# Patient Record
Sex: MALE | Race: WHITE | NOT HISPANIC OR LATINO | Employment: UNEMPLOYED | URBAN - METROPOLITAN AREA
[De-identification: names, ages, dates, MRNs, and addresses within clinical notes are randomized per-mention and may not be internally consistent; named-entity substitution may affect disease eponyms.]

---

## 2022-04-09 ENCOUNTER — HISTORICAL (OUTPATIENT)
Dept: ADMINISTRATIVE | Facility: HOSPITAL | Age: 57
End: 2022-04-09

## 2022-04-25 VITALS
DIASTOLIC BLOOD PRESSURE: 78 MMHG | BODY MASS INDEX: 30.72 KG/M2 | HEIGHT: 69 IN | WEIGHT: 207.44 LBS | SYSTOLIC BLOOD PRESSURE: 128 MMHG | OXYGEN SATURATION: 99 %

## 2022-07-20 ENCOUNTER — HOSPITAL ENCOUNTER (EMERGENCY)
Facility: HOSPITAL | Age: 57
Discharge: HOME | End: 2022-07-20
Attending: FAMILY MEDICINE
Payer: MEDICAID

## 2022-07-20 VITALS
HEIGHT: 69 IN | WEIGHT: 190 LBS | OXYGEN SATURATION: 96 % | RESPIRATION RATE: 14 BRPM | HEART RATE: 56 BPM | DIASTOLIC BLOOD PRESSURE: 87 MMHG | BODY MASS INDEX: 28.14 KG/M2 | TEMPERATURE: 98 F | SYSTOLIC BLOOD PRESSURE: 149 MMHG

## 2022-07-20 DIAGNOSIS — I10 PRIMARY HYPERTENSION: Primary | ICD-10-CM

## 2022-07-20 DIAGNOSIS — R05.9 COUGH: ICD-10-CM

## 2022-07-20 LAB
AMPHET UR QL SCN: POSITIVE
ANION GAP SERPL CALC-SCNC: 11 MEQ/L
BARBITURATE SCN PRESENT UR: NEGATIVE
BASOPHILS # BLD AUTO: 0.03 X10(3)/MCL (ref 0–0.2)
BASOPHILS NFR BLD AUTO: 0.8 %
BENZODIAZ UR QL SCN: NEGATIVE
BUN SERPL-MCNC: 32.5 MG/DL (ref 8.4–25.7)
CALCIUM SERPL-MCNC: 9.5 MG/DL (ref 8.4–10.2)
CANNABINOIDS UR QL SCN: NEGATIVE
CHLORIDE SERPL-SCNC: 103 MMOL/L (ref 98–107)
CO2 SERPL-SCNC: 24 MMOL/L (ref 22–29)
COCAINE UR QL SCN: NEGATIVE
CREAT SERPL-MCNC: 1.62 MG/DL (ref 0.73–1.18)
CREAT/UREA NIT SERPL: 20
EOSINOPHIL # BLD AUTO: 0.11 X10(3)/MCL (ref 0–0.9)
EOSINOPHIL NFR BLD AUTO: 3.1 %
ERYTHROCYTE [DISTWIDTH] IN BLOOD BY AUTOMATED COUNT: 13.3 % (ref 11.5–17)
FLUAV AG UPPER RESP QL IA.RAPID: NOT DETECTED
FLUBV AG UPPER RESP QL IA.RAPID: NOT DETECTED
GLUCOSE SERPL-MCNC: 116 MG/DL (ref 74–100)
HCT VFR BLD AUTO: 37.9 % (ref 42–52)
HGB BLD-MCNC: 12.7 GM/DL (ref 14–18)
IMM GRANULOCYTES # BLD AUTO: 0.01 X10(3)/MCL (ref 0–0.04)
IMM GRANULOCYTES NFR BLD AUTO: 0.3 %
LYMPHOCYTES # BLD AUTO: 1.29 X10(3)/MCL (ref 0.6–4.6)
LYMPHOCYTES NFR BLD AUTO: 35.8 %
MCH RBC QN AUTO: 27.9 PG (ref 27–31)
MCHC RBC AUTO-ENTMCNC: 33.5 MG/DL (ref 33–36)
MCV RBC AUTO: 83.1 FL (ref 80–94)
MONOCYTES # BLD AUTO: 0.51 X10(3)/MCL (ref 0.1–1.3)
MONOCYTES NFR BLD AUTO: 14.2 %
NEUTROPHILS # BLD AUTO: 1.7 X10(3)/MCL (ref 2.1–9.2)
NEUTROPHILS NFR BLD AUTO: 45.8 %
OPIATES UR QL SCN: NEGATIVE
PCP UR QL: NEGATIVE
PH UR: 6.5 [PH] (ref 3–11)
PLATELET # BLD AUTO: 181 X10(3)/MCL (ref 130–400)
PMV BLD AUTO: 8.4 FL (ref 7.4–10.4)
POC CARDIAC TROPONIN I: 0 NG/ML
POTASSIUM SERPL-SCNC: 4.3 MMOL/L (ref 3.5–5.1)
RBC # BLD AUTO: 4.56 X10(6)/MCL (ref 4.7–6.1)
RSV A 5' UTR RNA NPH QL NAA+PROBE: NOT DETECTED
SAMPLE: NORMAL
SARS-COV-2 RNA RESP QL NAA+PROBE: NOT DETECTED
SODIUM SERPL-SCNC: 138 MMOL/L (ref 136–145)
SPECIFIC GRAVITY, URINE AUTO (.000) (OHS): 1.02 (ref 1–1.03)
WBC # SPEC AUTO: 3.6 X10(3)/MCL (ref 4.5–11.5)

## 2022-07-20 PROCEDURE — 85025 COMPLETE CBC W/AUTO DIFF WBC: CPT | Performed by: FAMILY MEDICINE

## 2022-07-20 PROCEDURE — 96374 THER/PROPH/DIAG INJ IV PUSH: CPT

## 2022-07-20 PROCEDURE — 36415 COLL VENOUS BLD VENIPUNCTURE: CPT | Performed by: FAMILY MEDICINE

## 2022-07-20 PROCEDURE — 93005 ELECTROCARDIOGRAM TRACING: CPT

## 2022-07-20 PROCEDURE — 96375 TX/PRO/DX INJ NEW DRUG ADDON: CPT

## 2022-07-20 PROCEDURE — 63600175 PHARM REV CODE 636 W HCPCS: Performed by: FAMILY MEDICINE

## 2022-07-20 PROCEDURE — 93010 EKG 12-LEAD: ICD-10-PCS | Mod: ,,, | Performed by: INTERNAL MEDICINE

## 2022-07-20 PROCEDURE — 80307 DRUG TEST PRSMV CHEM ANLYZR: CPT | Performed by: FAMILY MEDICINE

## 2022-07-20 PROCEDURE — 93010 ELECTROCARDIOGRAM REPORT: CPT | Mod: ,,, | Performed by: INTERNAL MEDICINE

## 2022-07-20 PROCEDURE — 80048 BASIC METABOLIC PNL TOTAL CA: CPT | Performed by: FAMILY MEDICINE

## 2022-07-20 PROCEDURE — 87636 SARSCOV2 & INF A&B AMP PRB: CPT | Performed by: FAMILY MEDICINE

## 2022-07-20 PROCEDURE — 25000003 PHARM REV CODE 250: Performed by: FAMILY MEDICINE

## 2022-07-20 PROCEDURE — 99285 EMERGENCY DEPT VISIT HI MDM: CPT | Mod: 25

## 2022-07-20 PROCEDURE — 84484 ASSAY OF TROPONIN QUANT: CPT

## 2022-07-20 RX ORDER — LABETALOL HYDROCHLORIDE 5 MG/ML
20 INJECTION, SOLUTION INTRAVENOUS
Status: COMPLETED | OUTPATIENT
Start: 2022-07-20 | End: 2022-07-20

## 2022-07-20 RX ORDER — KETOROLAC TROMETHAMINE 30 MG/ML
30 INJECTION, SOLUTION INTRAMUSCULAR; INTRAVENOUS
Status: COMPLETED | OUTPATIENT
Start: 2022-07-20 | End: 2022-07-20

## 2022-07-20 RX ORDER — BISOPROLOL FUMARATE AND HYDROCHLOROTHIAZIDE 10; 6.25 MG/1; MG/1
1 TABLET ORAL DAILY
Qty: 90 TABLET | Refills: 3 | Status: SHIPPED | OUTPATIENT
Start: 2022-07-20 | End: 2024-02-07

## 2022-07-20 RX ORDER — ACETAMINOPHEN 500 MG
1000 TABLET ORAL
Status: COMPLETED | OUTPATIENT
Start: 2022-07-20 | End: 2022-07-20

## 2022-07-20 RX ORDER — AMLODIPINE BESYLATE 10 MG/1
10 TABLET ORAL DAILY
Qty: 90 TABLET | Refills: 3 | Status: SHIPPED | OUTPATIENT
Start: 2022-07-20 | End: 2024-02-07

## 2022-07-20 RX ORDER — LISINOPRIL 10 MG/1
10 TABLET ORAL DAILY
Qty: 90 TABLET | Refills: 3 | Status: SHIPPED | OUTPATIENT
Start: 2022-07-20 | End: 2024-02-07

## 2022-07-20 RX ORDER — AMLODIPINE BESYLATE 5 MG/1
5 TABLET ORAL
Status: COMPLETED | OUTPATIENT
Start: 2022-07-20 | End: 2022-07-20

## 2022-07-20 RX ORDER — LISINOPRIL 10 MG/1
10 TABLET ORAL
Status: COMPLETED | OUTPATIENT
Start: 2022-07-20 | End: 2022-07-20

## 2022-07-20 RX ADMIN — LABETALOL HYDROCHLORIDE 20 MG: 5 INJECTION, SOLUTION INTRAVENOUS at 11:07

## 2022-07-20 RX ADMIN — AMLODIPINE BESYLATE 5 MG: 5 TABLET ORAL at 01:07

## 2022-07-20 RX ADMIN — KETOROLAC TROMETHAMINE 30 MG: 30 INJECTION, SOLUTION INTRAMUSCULAR; INTRAVENOUS at 12:07

## 2022-07-20 RX ADMIN — ACETAMINOPHEN 1000 MG: 500 TABLET, FILM COATED ORAL at 11:07

## 2022-07-20 RX ADMIN — LISINOPRIL 10 MG: 10 TABLET ORAL at 01:07

## 2022-07-20 NOTE — ED PROVIDER NOTES
Encounter Date: 7/20/2022       History     Chief Complaint   Patient presents with    Shortness of Breath    Dizziness     56-year-old presents complaining of coughing shortness of breath some dizziness headache patient says he has been off his blood pressure medicines for about a week started feeling bad yesterday also some low-grade fever body aches flu-like symptoms        Review of patient's allergies indicates:  Not on File  History reviewed. No pertinent past medical history.  History reviewed. No pertinent surgical history.  History reviewed. No pertinent family history.     Review of Systems   Constitutional: Negative for fever.   HENT: Positive for congestion. Negative for sore throat.    Respiratory: Positive for cough. Negative for shortness of breath.    Cardiovascular: Negative for chest pain.   Gastrointestinal: Negative for nausea.   Genitourinary: Negative for dysuria.   Musculoskeletal: Negative for back pain.   Skin: Negative for rash.   Neurological: Negative for weakness.   Hematological: Does not bruise/bleed easily.   All other systems reviewed and are negative.      Physical Exam     Initial Vitals [07/20/22 1100]   BP Pulse Resp Temp SpO2   (!) 183/104 68 18 97.6 °F (36.4 °C) 100 %      MAP       --         Physical Exam    Nursing note and vitals reviewed.  Constitutional: He appears well-developed and well-nourished. He is active.   HENT:   Head: Normocephalic and atraumatic.   Eyes: Conjunctivae, EOM and lids are normal. Pupils are equal, round, and reactive to light.   Neck: Trachea normal and phonation normal. Neck supple. No thyroid mass present.   Normal range of motion.  Cardiovascular: Normal rate, regular rhythm, normal heart sounds and normal pulses.   Pulmonary/Chest: Breath sounds normal.   Abdominal: Abdomen is soft. Bowel sounds are normal.   Musculoskeletal:         General: Normal range of motion.      Cervical back: Normal range of motion and neck supple.      Neurological: He is alert and oriented to person, place, and time. He has normal strength and normal reflexes.   Skin: Skin is warm and intact.   Psychiatric: He has a normal mood and affect. His speech is normal and behavior is normal. Judgment and thought content normal. Cognition and memory are normal.         ED Course   Procedures  Labs Reviewed   BASIC METABOLIC PANEL - Abnormal; Notable for the following components:       Result Value    Glucose Level 116 (*)     Blood Urea Nitrogen 32.5 (*)     Creatinine 1.62 (*)     All other components within normal limits   DRUG SCREEN, URINE (BEAKER) - Abnormal; Notable for the following components:    Amphetamines, Urine Positive (*)     All other components within normal limits    Narrative:     Cut off concentrations:    Amphetamines - 1000 ng/ml  Barbiturates - 200 ng/ml  Benzodiazepine - 200 ng/ml  Cannabinoids (THC) - 50 ng/ml  Cocaine - 300 ng/ml  Fentanyl - 1.0 ng/ml  MDMA - 500 ng/ml  Opiates - 300 ng/ml   Phencyclidine (PCP) - 25 ng/ml    Specimen submitted for drug analysis and tested for pH and specific gravity in order to evaluate sample integrity. Suspect tampering if specific gravity is <1.003 and/or pH is not within the range of 4.5 - 8.0  False negatives may result form substances such as bleach added to urine.  False positives may result for the presence of a substance with similar chemical structure to the drug or its metabolite.    This test provides only a PRELIMINARY analytical test result. A more specific alternate chemical method must be used in order to obtain a confirmed analytical result. Gas chromatography/mass spectrometry (GC/MS) is the preferred confirmatory method. Other chemical confirmation methods are available. Clinical consideration and professional judgement should be applied to any drug of abuse test result, particularly when preliminary positive results are used.    Positive results will be confirmed only at the physicians  request. Unconfirmed screening results are to be used only for medical purposes (treatment).        CBC WITH DIFFERENTIAL - Abnormal; Notable for the following components:    WBC 3.6 (*)     RBC 4.56 (*)     Hgb 12.7 (*)     Hct 37.9 (*)     Neut # 1.7 (*)     All other components within normal limits   COVID/RSV/FLU A&B PCR - Normal   CBC W/ AUTO DIFFERENTIAL    Narrative:     The following orders were created for panel order CBC Auto Differential.  Procedure                               Abnormality         Status                     ---------                               -----------         ------                     CBC with Differential[[id number]]        Abnormal            Final result                 Please view results for these tests on the individual orders.   TROPONIN ISTAT   POCT TROPONIN     EKG Readings: (Independently Interpreted)   Initial Reading: No STEMI. Rhythm: Normal Sinus Rhythm. Heart Rate: 67. Ectopy: No Ectopy. ST Segments: Normal ST Segments. T Waves: Normal. Axis: Normal.       Imaging Results          X-Ray Chest 1 View (Final result)  Result time 07/20/22 11:47:24    Final result by Aris Linda MD (07/20/22 11:47:24)                 Impression:      NO ACUTE CARDIOPULMONARY PROCESS IDENTIFIED.      Electronically signed by: Aris Linda  Date:    07/20/2022  Time:    11:47             Narrative:    EXAMINATION:  XR CHEST 1 VIEW    CLINICAL HISTORY:  Cough, unspecified    TECHNIQUE:  One view    COMPARISON:  May 5, 2020.    FINDINGS:  Cardiopericardial silhouette is within normal limits.  No acute dense focal or segmental consolidation, congestive process, pleural effusions or pneumothorax.                               CT Head Without Contrast (Final result)  Result time 07/20/22 11:47:06    Final result by Art Silva MD (07/20/22 11:47:06)                 Impression:      No acute intracranial abnormality identified.      Electronically signed by: Art  Silva  Date:    07/20/2022  Time:    11:47             Narrative:    EXAMINATION:  CT HEAD WITHOUT CONTRAST    CLINICAL HISTORY:  head trauma;    TECHNIQUE:  Low dose axial images were obtained through the head.  Coronal and sagittal reformations were also performed. Contrast was not administered.    Automatic exposure control was utilized to reduce the patient's radiation dose.    DLP= 1381    COMPARISON:  01/28/2021    FINDINGS:  No acute intracranial hemorrhage, edema or mass. No acute parenchymal abnormality.  Calcification from remote insult is noted about the left parietal region.    There is no hydrocephalus, evidence of herniation or midline shift. The ventricles and sulci are normal.    There is normal gray white differentiation.    The osseous structures are normal.    The mastoid air cells are clear.    The auditory canals are patent bilaterally.    The globes and orbital contents are normal bilaterally.    The visualized maxillary, ethmoid and sphenoid sinuses are clear.                                 Medications   acetaminophen tablet 1,000 mg (1,000 mg Oral Given 7/20/22 1120)   labetaloL injection 20 mg (20 mg Intravenous Given 7/20/22 1115)   ketorolac injection 30 mg (30 mg Intravenous Given 7/20/22 1221)                 ED Course as of 07/20/22 1315   Wed Jul 20, 2022   1211 POC Cardiac Troponin I: 0.00 [BL]   1211 WBC(!): 3.6 [BL]   1211 Hemoglobin(!): 12.7 [BL]   1211 Hematocrit(!): 37.9 [BL]   1211 BUN(!): 32.5 [BL]   1211 Creatinine(!): 1.62 [BL]   1212 Amphetamine Screen, Ur(!): Positive [BL]   1259 SARS-CoV2 (COVID-19) Qualitative PCR: Not Detected [BL]   1259 RSV Ag by Molecular Method: Not Detected [BL]   1259 Influenza B, Molecular: Not Detected [BL]   1259 Influenza A, Molecular: Not Detected [BL]      ED Course User Index  [BL] Maikel Owens MD             Clinical Impression:   Final diagnoses:  [R05.9] Cough  [I10] Primary hypertension (Primary)          ED Disposition  Condition    Discharge Stable        ED Prescriptions     Medication Sig Dispense Start Date End Date Auth. Provider    amLODIPine (NORVASC) 10 MG tablet Take 1 tablet (10 mg total) by mouth once daily. 90 tablet 7/20/2022  Maikel Owens MD    bisoproloL-hydrochlorothiazide (ZIAC) 10-6.25 mg per tablet Take 1 tablet by mouth once daily. 90 tablet 7/20/2022  Maikel Owens MD    lisinopriL 10 MG tablet Take 1 tablet (10 mg total) by mouth once daily. 90 tablet 7/20/2022  Maikel Owens MD        Follow-up Information     Follow up With Specialties Details Why Contact Info    [facility] - Emergency Dept Emergency Medicine  If symptoms worsen [address] [facility] [address] [phone]           Maikel Owens MD  07/20/22 0693       Maikel Owens MD  07/20/22 4483

## 2022-07-20 NOTE — ED NOTES
Pt c/o sob that started this morning, along with dizziness, Pt denies CP, fever, chills. Pt c/o cough and some congestion. Past medical hx pt states he had a brain aneurysm about 3 years ago, was seen at [facility]. Pt unable to state what medications he takes for HTN, pt states he has been out of his medicines for 2 weeks.

## 2022-10-01 ENCOUNTER — HOSPITAL ENCOUNTER (OUTPATIENT)
Facility: HOSPITAL | Age: 57
Discharge: HOME | End: 2022-10-03
Attending: SPECIALIST | Admitting: STUDENT IN AN ORGANIZED HEALTH CARE EDUCATION/TRAINING PROGRAM
Payer: MEDICAID

## 2022-10-01 DIAGNOSIS — G43.019 INTRACTABLE MIGRAINE WITHOUT AURA AND WITHOUT STATUS MIGRAINOSUS: ICD-10-CM

## 2022-10-01 DIAGNOSIS — G43.719 INTRACTABLE CHRONIC MIGRAINE WITHOUT AURA AND WITHOUT STATUS MIGRAINOSUS: ICD-10-CM

## 2022-10-01 DIAGNOSIS — A08.4 VIRAL GASTROENTERITIS: ICD-10-CM

## 2022-10-01 DIAGNOSIS — R03.0 ELEVATED BLOOD PRESSURE READING: ICD-10-CM

## 2022-10-01 DIAGNOSIS — R11.2 NAUSEA AND VOMITING, UNSPECIFIED VOMITING TYPE: ICD-10-CM

## 2022-10-01 DIAGNOSIS — K52.9 ACUTE GASTROENTERITIS: Primary | ICD-10-CM

## 2022-10-01 LAB
ALBUMIN SERPL-MCNC: 4.1 GM/DL (ref 3.5–5)
ALBUMIN/GLOB SERPL: 1.2 RATIO (ref 1.1–2)
ALP SERPL-CCNC: 142 UNIT/L (ref 40–150)
ALT SERPL-CCNC: 21 UNIT/L (ref 0–55)
AST SERPL-CCNC: 18 UNIT/L (ref 5–34)
BASOPHILS # BLD AUTO: 0.02 X10(3)/MCL (ref 0–0.2)
BASOPHILS NFR BLD AUTO: 0.3 %
BILIRUBIN DIRECT+TOT PNL SERPL-MCNC: 0.2 MG/DL
BUN SERPL-MCNC: 23.6 MG/DL (ref 8.4–25.7)
CALCIUM SERPL-MCNC: 9.5 MG/DL (ref 8.4–10.2)
CHLORIDE SERPL-SCNC: 110 MMOL/L (ref 98–107)
CO2 SERPL-SCNC: 19 MMOL/L (ref 22–29)
CREAT SERPL-MCNC: 1.11 MG/DL (ref 0.73–1.18)
EOSINOPHIL # BLD AUTO: 0.34 X10(3)/MCL (ref 0–0.9)
EOSINOPHIL NFR BLD AUTO: 4.8 %
ERYTHROCYTE [DISTWIDTH] IN BLOOD BY AUTOMATED COUNT: 12.8 % (ref 11.5–17)
GFR SERPLBLD CREATININE-BSD FMLA CKD-EPI: >60 MLS/MIN/1.73/M2
GLOBULIN SER-MCNC: 3.4 GM/DL (ref 2.4–3.5)
GLUCOSE SERPL-MCNC: 78 MG/DL (ref 74–100)
HCT VFR BLD AUTO: 40.9 % (ref 42–52)
HGB BLD-MCNC: 14.1 GM/DL (ref 14–18)
IMM GRANULOCYTES # BLD AUTO: 0.01 X10(3)/MCL (ref 0–0.04)
IMM GRANULOCYTES NFR BLD AUTO: 0.1 %
LYMPHOCYTES # BLD AUTO: 1.46 X10(3)/MCL (ref 0.6–4.6)
LYMPHOCYTES NFR BLD AUTO: 20.6 %
MCH RBC QN AUTO: 27.7 PG (ref 27–31)
MCHC RBC AUTO-ENTMCNC: 34.5 MG/DL (ref 33–36)
MCV RBC AUTO: 80.4 FL (ref 80–94)
MONOCYTES # BLD AUTO: 0.6 X10(3)/MCL (ref 0.1–1.3)
MONOCYTES NFR BLD AUTO: 8.5 %
NEUTROPHILS # BLD AUTO: 4.7 X10(3)/MCL (ref 2.1–9.2)
NEUTROPHILS NFR BLD AUTO: 65.7 %
PLATELET # BLD AUTO: 240 X10(3)/MCL (ref 130–400)
PMV BLD AUTO: 10 FL (ref 7.4–10.4)
POTASSIUM SERPL-SCNC: 3.5 MMOL/L (ref 3.5–5.1)
PROT SERPL-MCNC: 7.5 GM/DL (ref 6.4–8.3)
RBC # BLD AUTO: 5.09 X10(6)/MCL (ref 4.7–6.1)
SODIUM SERPL-SCNC: 142 MMOL/L (ref 136–145)
WBC # SPEC AUTO: 7.1 X10(3)/MCL (ref 4.5–11.5)

## 2022-10-01 PROCEDURE — 85025 COMPLETE CBC W/AUTO DIFF WBC: CPT | Performed by: SPECIALIST

## 2022-10-01 PROCEDURE — 99285 EMERGENCY DEPT VISIT HI MDM: CPT | Mod: 25

## 2022-10-01 PROCEDURE — 25000003 PHARM REV CODE 250: Performed by: SPECIALIST

## 2022-10-01 PROCEDURE — 96375 TX/PRO/DX INJ NEW DRUG ADDON: CPT

## 2022-10-01 PROCEDURE — 83690 ASSAY OF LIPASE: CPT | Performed by: SPECIALIST

## 2022-10-01 PROCEDURE — 96374 THER/PROPH/DIAG INJ IV PUSH: CPT | Mod: 59

## 2022-10-01 PROCEDURE — 0241U COVID/RSV/FLU A&B PCR: CPT | Performed by: SPECIALIST

## 2022-10-01 PROCEDURE — 63600175 PHARM REV CODE 636 W HCPCS: Performed by: SPECIALIST

## 2022-10-01 PROCEDURE — 96361 HYDRATE IV INFUSION ADD-ON: CPT

## 2022-10-01 PROCEDURE — 36415 COLL VENOUS BLD VENIPUNCTURE: CPT | Performed by: SPECIALIST

## 2022-10-01 PROCEDURE — 80053 COMPREHEN METABOLIC PANEL: CPT | Performed by: SPECIALIST

## 2022-10-01 RX ORDER — KETOROLAC TROMETHAMINE 30 MG/ML
30 INJECTION, SOLUTION INTRAMUSCULAR; INTRAVENOUS
Status: COMPLETED | OUTPATIENT
Start: 2022-10-01 | End: 2022-10-01

## 2022-10-01 RX ORDER — ONDANSETRON 2 MG/ML
INJECTION INTRAMUSCULAR; INTRAVENOUS
Status: DISPENSED
Start: 2022-10-01 | End: 2022-10-02

## 2022-10-01 RX ORDER — ONDANSETRON 4 MG/1
4 TABLET, ORALLY DISINTEGRATING ORAL
Status: DISCONTINUED | OUTPATIENT
Start: 2022-10-01 | End: 2022-10-01

## 2022-10-01 RX ORDER — ONDANSETRON 2 MG/ML
4 INJECTION INTRAMUSCULAR; INTRAVENOUS
Status: COMPLETED | OUTPATIENT
Start: 2022-10-01 | End: 2022-10-01

## 2022-10-01 RX ORDER — HYDRALAZINE HYDROCHLORIDE 20 MG/ML
20 INJECTION INTRAMUSCULAR; INTRAVENOUS
Status: COMPLETED | OUTPATIENT
Start: 2022-10-02 | End: 2022-10-02

## 2022-10-01 RX ORDER — DIPHENOXYLATE HYDROCHLORIDE AND ATROPINE SULFATE 2.5; .025 MG/1; MG/1
2 TABLET ORAL
Status: COMPLETED | OUTPATIENT
Start: 2022-10-01 | End: 2022-10-01

## 2022-10-01 RX ADMIN — IOPAMIDOL 100 ML: 755 INJECTION, SOLUTION INTRAVENOUS at 11:10

## 2022-10-01 RX ADMIN — SODIUM CHLORIDE 1000 ML: 9 INJECTION, SOLUTION INTRAVENOUS at 10:10

## 2022-10-01 RX ADMIN — DIPHENOXYLATE HYDROCHLORIDE AND ATROPINE SULFATE 2 TABLET: .025; 2.5 TABLET ORAL at 09:10

## 2022-10-01 RX ADMIN — KETOROLAC TROMETHAMINE 30 MG: 30 INJECTION, SOLUTION INTRAMUSCULAR at 10:10

## 2022-10-01 RX ADMIN — ONDANSETRON 4 MG: 2 INJECTION INTRAMUSCULAR; INTRAVENOUS at 09:10

## 2022-10-02 LAB
AMPHET UR QL SCN: NEGATIVE
APPEARANCE UR: CLEAR
BACTERIA #/AREA URNS AUTO: NORMAL /HPF
BARBITURATE SCN PRESENT UR: NEGATIVE
BENZODIAZ UR QL SCN: NEGATIVE
BILIRUB UR QL STRIP.AUTO: NEGATIVE MG/DL
CANNABINOIDS UR QL SCN: NEGATIVE
CLOSTRIDIUM DIFFICILE GDH ANTIGEN (OHS): NEGATIVE
CLOSTRIDIUM DIFFICILE TOXIN A/B (OHS): NEGATIVE
COCAINE UR QL SCN: NEGATIVE
COLOR UR AUTO: YELLOW
FLUAV AG UPPER RESP QL IA.RAPID: NOT DETECTED
FLUBV AG UPPER RESP QL IA.RAPID: NOT DETECTED
GLUCOSE UR QL STRIP.AUTO: NEGATIVE MG/DL
KETONES UR QL STRIP.AUTO: NEGATIVE MG/DL
LEUKOCYTE ESTERASE UR QL STRIP.AUTO: NEGATIVE UNIT/L
LIPASE SERPL-CCNC: 79 U/L
NITRITE UR QL STRIP.AUTO: NEGATIVE
OPIATES UR QL SCN: POSITIVE
PCP UR QL: NEGATIVE
PH UR STRIP.AUTO: 5.5 [PH]
PH UR: 5.5 [PH] (ref 3–11)
PROT UR QL STRIP.AUTO: NEGATIVE MG/DL
RBC #/AREA URNS AUTO: NORMAL /HPF
RBC UR QL AUTO: NEGATIVE UNIT/L
RSV A 5' UTR RNA NPH QL NAA+PROBE: NOT DETECTED
SARS-COV-2 RNA RESP QL NAA+PROBE: NOT DETECTED
SP GR UR STRIP.AUTO: 1.02
SPECIFIC GRAVITY, URINE AUTO (.000) (OHS): 1.02 (ref 1–1.03)
SQUAMOUS #/AREA URNS AUTO: NORMAL /HPF
TROPONIN I SERPL-MCNC: <0.01 NG/ML (ref 0–0.04)
UROBILINOGEN UR STRIP-ACNC: 0.2 MG/DL
WBC #/AREA URNS AUTO: NORMAL /HPF

## 2022-10-02 PROCEDURE — 96365 THER/PROPH/DIAG IV INF INIT: CPT

## 2022-10-02 PROCEDURE — 96372 THER/PROPH/DIAG INJ SC/IM: CPT | Performed by: HOSPITALIST

## 2022-10-02 PROCEDURE — 84484 ASSAY OF TROPONIN QUANT: CPT | Performed by: HOSPITALIST

## 2022-10-02 PROCEDURE — 63600175 PHARM REV CODE 636 W HCPCS: Performed by: HOSPITALIST

## 2022-10-02 PROCEDURE — S0030 INJECTION, METRONIDAZOLE: HCPCS | Performed by: HOSPITALIST

## 2022-10-02 PROCEDURE — 84145 PROCALCITONIN (PCT): CPT | Performed by: HOSPITALIST

## 2022-10-02 PROCEDURE — 96375 TX/PRO/DX INJ NEW DRUG ADDON: CPT

## 2022-10-02 PROCEDURE — 96366 THER/PROPH/DIAG IV INF ADDON: CPT

## 2022-10-02 PROCEDURE — 96367 TX/PROPH/DG ADDL SEQ IV INF: CPT

## 2022-10-02 PROCEDURE — 25500020 PHARM REV CODE 255: Performed by: SPECIALIST

## 2022-10-02 PROCEDURE — 25000003 PHARM REV CODE 250: Performed by: SPECIALIST

## 2022-10-02 PROCEDURE — 89055 LEUKOCYTE ASSESSMENT FECAL: CPT | Performed by: HOSPITALIST

## 2022-10-02 PROCEDURE — 63600175 PHARM REV CODE 636 W HCPCS: Performed by: SPECIALIST

## 2022-10-02 PROCEDURE — 86318 IA INFECTIOUS AGENT ANTIBODY: CPT | Performed by: HOSPITALIST

## 2022-10-02 PROCEDURE — G0378 HOSPITAL OBSERVATION PER HR: HCPCS

## 2022-10-02 PROCEDURE — 87045 FECES CULTURE AEROBIC BACT: CPT | Performed by: HOSPITALIST

## 2022-10-02 PROCEDURE — 80307 DRUG TEST PRSMV CHEM ANLYZR: CPT | Performed by: HOSPITALIST

## 2022-10-02 PROCEDURE — 25000003 PHARM REV CODE 250: Performed by: HOSPITALIST

## 2022-10-02 PROCEDURE — 81001 URINALYSIS AUTO W/SCOPE: CPT | Performed by: HOSPITALIST

## 2022-10-02 PROCEDURE — 96361 HYDRATE IV INFUSION ADD-ON: CPT

## 2022-10-02 PROCEDURE — 36415 COLL VENOUS BLD VENIPUNCTURE: CPT | Performed by: HOSPITALIST

## 2022-10-02 RX ORDER — TALC
6 POWDER (GRAM) TOPICAL NIGHTLY PRN
Status: DISCONTINUED | OUTPATIENT
Start: 2022-10-02 | End: 2022-10-03 | Stop reason: HOSPADM

## 2022-10-02 RX ORDER — MORPHINE SULFATE 4 MG/ML
4 INJECTION, SOLUTION INTRAMUSCULAR; INTRAVENOUS EVERY 4 HOURS PRN
Status: DISCONTINUED | OUTPATIENT
Start: 2022-10-02 | End: 2022-10-03 | Stop reason: HOSPADM

## 2022-10-02 RX ORDER — PROCHLORPERAZINE EDISYLATE 5 MG/ML
10 INJECTION INTRAMUSCULAR; INTRAVENOUS ONCE
Status: COMPLETED | OUTPATIENT
Start: 2022-10-02 | End: 2022-10-02

## 2022-10-02 RX ORDER — BUTALBITAL, ACETAMINOPHEN AND CAFFEINE 50; 325; 40 MG/1; MG/1; MG/1
1 TABLET ORAL EVERY 6 HOURS PRN
Status: DISCONTINUED | OUTPATIENT
Start: 2022-10-02 | End: 2022-10-03 | Stop reason: HOSPADM

## 2022-10-02 RX ORDER — DIVALPROEX SODIUM 500 MG/1
1000 TABLET, FILM COATED, EXTENDED RELEASE ORAL ONCE
Status: COMPLETED | OUTPATIENT
Start: 2022-10-02 | End: 2022-10-02

## 2022-10-02 RX ORDER — MAGNESIUM SULFATE 1 G/100ML
1 INJECTION INTRAVENOUS ONCE
Status: COMPLETED | OUTPATIENT
Start: 2022-10-02 | End: 2022-10-02

## 2022-10-02 RX ORDER — ACETAMINOPHEN 325 MG/1
650 TABLET ORAL EVERY 8 HOURS PRN
Status: DISCONTINUED | OUTPATIENT
Start: 2022-10-02 | End: 2022-10-03 | Stop reason: HOSPADM

## 2022-10-02 RX ORDER — ONDANSETRON 2 MG/ML
4 INJECTION INTRAMUSCULAR; INTRAVENOUS EVERY 8 HOURS PRN
Status: DISCONTINUED | OUTPATIENT
Start: 2022-10-02 | End: 2022-10-03 | Stop reason: HOSPADM

## 2022-10-02 RX ORDER — SODIUM CHLORIDE 9 MG/ML
1000 INJECTION, SOLUTION INTRAVENOUS
Status: COMPLETED | OUTPATIENT
Start: 2022-10-02 | End: 2022-10-02

## 2022-10-02 RX ORDER — MORPHINE SULFATE 4 MG/ML
4 INJECTION, SOLUTION INTRAMUSCULAR; INTRAVENOUS
Status: COMPLETED | OUTPATIENT
Start: 2022-10-02 | End: 2022-10-02

## 2022-10-02 RX ORDER — ONDANSETRON 4 MG/1
8 TABLET, ORALLY DISINTEGRATING ORAL EVERY 8 HOURS PRN
Status: DISCONTINUED | OUTPATIENT
Start: 2022-10-02 | End: 2022-10-03 | Stop reason: HOSPADM

## 2022-10-02 RX ORDER — SODIUM CHLORIDE 0.9 % (FLUSH) 0.9 %
2 SYRINGE (ML) INJECTION EVERY 6 HOURS PRN
Status: DISCONTINUED | OUTPATIENT
Start: 2022-10-02 | End: 2022-10-03 | Stop reason: HOSPADM

## 2022-10-02 RX ORDER — KETOROLAC TROMETHAMINE 30 MG/ML
30 INJECTION, SOLUTION INTRAMUSCULAR; INTRAVENOUS ONCE
Status: COMPLETED | OUTPATIENT
Start: 2022-10-02 | End: 2022-10-02

## 2022-10-02 RX ORDER — DEXTROSE MONOHYDRATE AND SODIUM CHLORIDE 5; .9 G/100ML; G/100ML
INJECTION, SOLUTION INTRAVENOUS CONTINUOUS
Status: DISCONTINUED | OUTPATIENT
Start: 2022-10-02 | End: 2022-10-03 | Stop reason: HOSPADM

## 2022-10-02 RX ORDER — HEPARIN SODIUM 5000 [USP'U]/ML
5000 INJECTION, SOLUTION INTRAVENOUS; SUBCUTANEOUS EVERY 12 HOURS
Status: DISCONTINUED | OUTPATIENT
Start: 2022-10-02 | End: 2022-10-03 | Stop reason: HOSPADM

## 2022-10-02 RX ORDER — METRONIDAZOLE 500 MG/100ML
500 INJECTION, SOLUTION INTRAVENOUS
Status: DISCONTINUED | OUTPATIENT
Start: 2022-10-02 | End: 2022-10-03

## 2022-10-02 RX ADMIN — SODIUM CHLORIDE 1000 ML: 9 INJECTION, SOLUTION INTRAVENOUS at 04:10

## 2022-10-02 RX ADMIN — METRONIDAZOLE 500 MG: 500 INJECTION, SOLUTION INTRAVENOUS at 05:10

## 2022-10-02 RX ADMIN — ONDANSETRON 8 MG: 4 TABLET, ORALLY DISINTEGRATING ORAL at 07:10

## 2022-10-02 RX ADMIN — METHYLPREDNISOLONE SODIUM SUCCINATE 40 MG: 40 INJECTION, POWDER, FOR SOLUTION INTRAMUSCULAR; INTRAVENOUS at 01:10

## 2022-10-02 RX ADMIN — MORPHINE SULFATE 4 MG: 4 INJECTION INTRAVENOUS at 12:10

## 2022-10-02 RX ADMIN — ACETAMINOPHEN 650 MG: 325 TABLET ORAL at 04:10

## 2022-10-02 RX ADMIN — DEXTROSE AND SODIUM CHLORIDE: 5; 900 INJECTION, SOLUTION INTRAVENOUS at 10:10

## 2022-10-02 RX ADMIN — DIVALPROEX SODIUM 1000 MG: 500 TABLET, FILM COATED, EXTENDED RELEASE ORAL at 01:10

## 2022-10-02 RX ADMIN — DEXTROSE MONOHYDRATE 1 G: 50 INJECTION, SOLUTION INTRAVENOUS at 11:10

## 2022-10-02 RX ADMIN — KETOROLAC TROMETHAMINE 30 MG: 30 INJECTION, SOLUTION INTRAMUSCULAR; INTRAVENOUS at 01:10

## 2022-10-02 RX ADMIN — MORPHINE SULFATE 4 MG: 4 INJECTION INTRAVENOUS at 07:10

## 2022-10-02 RX ADMIN — METRONIDAZOLE 500 MG: 500 INJECTION, SOLUTION INTRAVENOUS at 09:10

## 2022-10-02 RX ADMIN — DEXTROSE AND SODIUM CHLORIDE: 5; 900 INJECTION, SOLUTION INTRAVENOUS at 09:10

## 2022-10-02 RX ADMIN — PROCHLORPERAZINE EDISYLATE 10 MG: 5 INJECTION INTRAMUSCULAR; INTRAVENOUS at 12:10

## 2022-10-02 RX ADMIN — MAGNESIUM SULFATE 1 G: 1 INJECTION INTRAVENOUS at 01:10

## 2022-10-02 RX ADMIN — HEPARIN SODIUM 5000 UNITS: 5000 INJECTION, SOLUTION INTRAVENOUS; SUBCUTANEOUS at 09:10

## 2022-10-02 RX ADMIN — BUTALBITAL, ACETAMINOPHEN AND CAFFEINE 1 TABLET: 50; 325; 40 TABLET ORAL at 09:10

## 2022-10-02 RX ADMIN — HYDRALAZINE HYDROCHLORIDE 20 MG: 20 INJECTION, SOLUTION INTRAMUSCULAR; INTRAVENOUS at 12:10

## 2022-10-02 NOTE — ED NOTES
"Patient lying dwon in bed with eyes closed. Patient woken up to have abd pain reassessed. Patient stated he still has some discomfort but pain 5/10. Requesting music to be played to "soothe" him.   "

## 2022-10-02 NOTE — ED NOTES
Attempts to contact  unsuccessful. Called Jenae Osorio [facility], to notify of issue. Stated she will attempt to resolve the issue.

## 2022-10-02 NOTE — ED NOTES
Patient co diarrhea and vomiting x3-4 days, unable to eat or drink. Guarding, abd symmetrical. Co headache that is worsened by light or sound. Iv initiated, warm blanket provided and call bell within reach.

## 2022-10-02 NOTE — ED PROVIDER NOTES
Encounter Date: 10/1/2022       History     Chief Complaint   Patient presents with    Vomiting     W diarrhea x4  days- multiple episodes. Hx htn - cant hold downhis bp meds- denies hx DM.       Nausea and vomiting with diarrhea for 4 days; no fever or chills, no blood in stool; c/o abdominal cramping      Review of patient's allergies indicates:  No Known Allergies  No past medical history on file.  No past surgical history on file.  No family history on file.     Review of Systems   Constitutional: Negative.    HENT: Negative.     Respiratory: Negative.     Cardiovascular: Negative.    Gastrointestinal: Negative.    Genitourinary: Negative.    Musculoskeletal: Negative.    Neurological: Negative.    Psychiatric/Behavioral:  The patient is nervous/anxious.      Physical Exam     Initial Vitals [10/01/22 2111]   BP Pulse Resp Temp SpO2   (!) 172/114 97 (!) 28 96.3 °F (35.7 °C) 99 %      MAP       --         Physical Exam    Nursing note and vitals reviewed.  Constitutional: He appears well-developed and well-nourished.   HENT:   Head: Normocephalic and atraumatic.   Eyes: EOM are normal. Pupils are equal, round, and reactive to light.   Neck: Neck supple.   Normal range of motion.  Cardiovascular:  Normal rate, regular rhythm and normal heart sounds.           Pulmonary/Chest: Breath sounds normal.   Abdominal: Abdomen is soft. Bowel sounds are normal. He exhibits distension (mild). There is abdominal tenderness (mild, generalized).   Musculoskeletal:         General: Normal range of motion.      Cervical back: Normal range of motion and neck supple.     Neurological: He is alert and oriented to person, place, and time.   Skin: Skin is warm and dry.       ED Course   Procedures  Labs Reviewed   COMPREHENSIVE METABOLIC PANEL - Abnormal; Notable for the following components:       Result Value    Chloride 110 (*)     Carbon Dioxide 19 (*)     All other components within normal limits   CBC WITH DIFFERENTIAL -  Abnormal; Notable for the following components:    Hct 40.9 (*)     All other components within normal limits   COVID/RSV/FLU A&B PCR - Normal   CBC W/ AUTO DIFFERENTIAL    Narrative:     The following orders were created for panel order CBC auto differential.  Procedure                               Abnormality         Status                     ---------                               -----------         ------                     CBC with Differential[[id number]]        Abnormal            Final result                 Please view results for these tests on the individual orders.   LIPASE          Imaging Results              CT Abdomen Pelvis With Contrast (Preliminary result)  Result time 10/01/22 22:59:07      Preliminary result by Ashkan Patel MD (10/01/22 22:59:07)                   Narrative:    START OF REPORT:  Technique: CT of the abdomen and pelvis was performed with axial images as well as sagittal and coronal reconstruction images with intravenous contrast.    Comparison: None available.    Clinical History: Vomiting (W diarrhea x4 days- multiple episodes. Hx htn - cant hold downhis bp meds- denies hx DM. ).    Dosage Information: Automated Exposure Control was utilized 1329.41 mGy.cm.    Findings:  Lines and Tubes: None.  Thorax:  Lungs: The visualized lung bases appear unremarkable.  Pleura: No effusions or thickening. No pneumothorax is seen in the visualized lung bases.  Heart: The heart size is within normal limits.  Abdomen:  Abdominal Wall: There is a small uncomplicated umbilical hernia which contains mesenteric fat.  Liver: The liver appears unremarkable.  Biliary System: No intrahepatic or extrahepatic biliary duct dilatation is seen.  Gallbladder: The gallbladder is non-distended and appears otherwise unremarkable.  Pancreas: The pancreas appears unremarkable.  Spleen: The spleen appears unremarkable.  Adrenals: The adrenal glands appear unremarkable.  Kidneys: The kidneys appear  unremarkable with no stones cysts masses or hydronephrosis.  Aorta: There is moderate calcification of the abdominal aorta and its branches.  IVC: Unremarkable.  Bowel: There are a few loops of fluid-filled small bowel in the pelvis with nearly the entire colon full but nondistended with suggestion of mild mucosal enhancement.  Esophagus: The visualized esophagus appears unremarkable.  Stomach: The stomach appears unremarkable.  Duodenum: Unremarkable appearing duodenum.  Appendix: The appendix appears unremarkable and is seen on âImage 73, Series 3â.  Peritoneum: No intraperitoneal free air or ascites is seen.    Pelvis:  Bladder: The bladder is nondistended.  Male:  Prostate gland: There are a few calcifications in the prostate gland.    Bony structures:  Dorsal Spine: There is spondylosis of the visualized dorsal spine.  Bony Pelvis: The visualized bony structures of the pelvis appear unremarkable.      Impression:  1. There are a few loops of fluid-filled small bowel in the pelvis with nearly the entire colon full but nondistended with suggestion of mild mucosal enhancement. These findings could reflect an element of the enterocolitis. Correlate clinically as regards additional evaluation and follow-up.  2. The bladder is nondistended however the bladder wall appears thickened after considering state of nondistension which could reflect an element of cystitis. Correlate with clinical and laboratory findings.  3. Details and other findings as discussed above.                          Preliminary result by Fresh !, Rad Results In (10/01/22 22:59:07)                   Narrative:    START OF REPORT:  Technique: CT of the abdomen and pelvis was performed with axial images as well as sagittal and coronal reconstruction images with intravenous contrast.    Comparison: None available.    Clinical History: Vomiting (W diarrhea x4 days- multiple episodes. Hx htn - cant hold downhis bp meds- denies hx DM.  ).    Dosage Information: Automated Exposure Control was utilized 1329.41 mGy.cm.    Findings:  Lines and Tubes: None.  Thorax:  Lungs: The visualized lung bases appear unremarkable.  Pleura: No effusions or thickening. No pneumothorax is seen in the visualized lung bases.  Heart: The heart size is within normal limits.  Abdomen:  Abdominal Wall: There is a small uncomplicated umbilical hernia which contains mesenteric fat.  Liver: The liver appears unremarkable.  Biliary System: No intrahepatic or extrahepatic biliary duct dilatation is seen.  Gallbladder: The gallbladder is non-distended and appears otherwise unremarkable.  Pancreas: The pancreas appears unremarkable.  Spleen: The spleen appears unremarkable.  Adrenals: The adrenal glands appear unremarkable.  Kidneys: The kidneys appear unremarkable with no stones cysts masses or hydronephrosis.  Aorta: There is moderate calcification of the abdominal aorta and its branches.  IVC: Unremarkable.  Bowel: There are a few loops of fluid-filled small bowel in the pelvis with nearly the entire colon full but nondistended with suggestion of mild mucosal enhancement.  Esophagus: The visualized esophagus appears unremarkable.  Stomach: The stomach appears unremarkable.  Duodenum: Unremarkable appearing duodenum.  Appendix: The appendix appears unremarkable and is seen on âImage 73, Series 3â.  Peritoneum: No intraperitoneal free air or ascites is seen.    Pelvis:  Bladder: The bladder is nondistended.  Male:  Prostate gland: There are a few calcifications in the prostate gland.    Bony structures:  Dorsal Spine: There is spondylosis of the visualized dorsal spine.  Bony Pelvis: The visualized bony structures of the pelvis appear unremarkable.      Impression:  1. There are a few loops of fluid-filled small bowel in the pelvis with nearly the entire colon full but nondistended with suggestion of mild mucosal enhancement. These findings could reflect an element of the  "enterocolitis. Correlate clinically as regards additional evaluation and follow-up.  2. The bladder is nondistended however the bladder wall appears thickened after considering state of nondistension which could reflect an element of cystitis. Correlate with clinical and laboratory findings.  3. Details and other findings as discussed above.                                         Medications   ondansetron 4 mg/2 mL injection (has no administration in time range)   sodium chloride 0.9% flush 2 mL (has no administration in time range)   melatonin tablet 6 mg (has no administration in time range)   acetaminophen tablet 650 mg (650 mg Oral Given 10/2/22 0415)   morphine injection 4 mg (has no administration in time range)   ondansetron disintegrating tablet 8 mg (has no administration in time range)   ondansetron injection 4 mg (has no administration in time range)   diphenoxylate-atropine 2.5-0.025 mg per tablet 2 tablet (2 tablets Oral Given 10/1/22 2127)   ondansetron injection 4 mg (4 mg Intravenous Given 10/1/22 2149)   ketorolac injection 30 mg (30 mg Intravenous Given 10/1/22 2210)   sodium chloride 0.9% bolus 1,000 mL (0 mLs Intravenous Stopped 10/1/22 2310)   iopamidoL (ISOVUE-370) injection 100 mL (100 mLs Intravenous Given 10/1/22 2303)   hydrALAZINE injection 20 mg (20 mg Intravenous Given 10/2/22 0002)   morphine injection 4 mg (4 mg Intravenous Given 10/2/22 0055)   0.9%  NaCl infusion (1,000 mLs Intravenous New Bag 10/2/22 0414)                       Patient Vitals for the past 24 hrs:   BP Temp Temp src Pulse Resp SpO2 Height Weight   10/02/22 0358 -- -- -- -- -- -- 5' 9" (1.753 m) 87 kg (191 lb 12.8 oz)   10/02/22 0333 111/63 97.7 °F (36.5 °C) Oral 79 16 96 % -- --   10/02/22 0300 (!) 141/80 -- -- 91 -- 95 % -- --   10/02/22 0230 (!) 151/88 -- -- 95 -- 95 % -- --   10/02/22 0130 (!) 154/96 -- -- 92 -- 98 % -- --   10/02/22 0055 -- -- -- -- 18 -- -- --   10/02/22 0002 (!) 172/99 -- -- 75 -- 97 % -- -- "   10/01/22 2200 123/85 -- -- 96 -- 98 % -- --   10/01/22 2111 (!) 172/114 96.3 °F (35.7 °C) Temporal 97 (!) 28 99 % -- --            Clinical Impression:   Final diagnoses:  [A08.4] Viral gastroenteritis (Primary)  [R11.2] Nausea and vomiting, unspecified vomiting type  [R03.0] Elevated blood pressure reading      ED Disposition Condition    Observation Stable                Armani Gillette MD  10/02/22 2180

## 2022-10-02 NOTE — PLAN OF CARE
[facility]  Initial Discharge Assessment       Primary Care Provider: Primary Doctor No    Admission Diagnosis: Elevated blood pressure reading [R03.0]  Viral gastroenteritis [A08.4]  Nausea and vomiting, unspecified vomiting type [R11.2]    Admission Date: 10/1/2022  Expected Discharge Date:     Discharge Barriers Identified: None    Payor: MEDICAID / [facility] / Product Type: Managed Medicaid /     Extended Emergency Contact Information  Primary Emergency Contact: MAGO ADEN  Mobile Phone: [phone]  Relation: Other  Preferred language: English   needed? No    Discharge Plan A: Home with family         [facility] - [place], [place] - [address]  [address]  [facility] [address]  Phone: [phone] Fax: [phone]    [facility] - [place], [place] - [address]  [address] [facility] [address]  Phone: [phone] Fax: [phone]      Initial Assessment (most recent)       Adult Discharge Assessment - 10/02/22 0913          Discharge Assessment    Assessment Type Discharge Planning Assessment     Confirmed/corrected address, phone number and insurance Yes     Confirmed Demographics Correct on Facesheet     Source of Information patient     If unable to respond/provide information was family/caregiver contacted? Yes     Contact Name/Number Mago SALEH [phone]     Does patient/caregiver understand observation status Yes     Communicated YESSI with patient/caregiver Date not available/Unable to determine     Reason For Admission Nausea Vomiting     Lives With alone     Facility Arrived From: home     Do you expect to return to your current living situation? Yes     Do you have help at home or someone to help you manage your care at home? Yes     Who are your caregiver(s) and their phone number(s)? Mago SALEH [phone]     Prior to hospitilization cognitive status: Alert/Oriented      Current cognitive status: Alert/Oriented     Walking or Climbing Stairs Difficulty none     Dressing/Bathing Difficulty none     Home Accessibility wheelchair accessible     Home Layout Able to live on 1st floor     Equipment Currently Used at Home none     Readmission within 30 days? No     Patient currently being followed by outpatient case management? No     Do you currently have service(s) that help you manage your care at home? No     Do you take prescription medications? Yes     Do you have prescription coverage? Yes     Coverage [facility] medicaid     Do you have any problems affording any of your prescribed medications? TBD     Is the patient taking medications as prescribed? yes     Who is going to help you get home at discharge? Matt SALEH [phone]     How do you get to doctors appointments? family or friend will provide     Are you on dialysis? No     Do you take coumadin? No     Discharge Plan A Home with family     DME Needed Upon Discharge  none     Discharge Plan discussed with: Friend     Name(s) and Number(s) Matt SALEH [phone]     Discharge Barriers Identified None        Physical Activity    On average, how many days per week do you engage in moderate to strenuous exercise (like a brisk walk)? Patient refused     On average, how many minutes do you engage in exercise at this level? Patient refused        Financial Resource Strain    How hard is it for you to pay for the very basics like food, housing, medical care, and heating? Patient refused        Housing Stability    In the last 12 months, was there a time when you were not able to pay the mortgage or rent on time? Patient refused     In the last 12 months, was there a time when you did not have a steady place to sleep or slept in a shelter (including now)? Patient refused        Transportation Needs    In the past 12 months, has lack of transportation kept you from medical appointments or from getting medications? Patient refused      In the past 12 months, has lack of transportation kept you from meetings, work, or from getting things needed for daily living? Patient refused        Food Insecurity    Within the past 12 months, you worried that your food would run out before you got the money to buy more. Patient refused     Within the past 12 months, the food you bought just didn't last and you didn't have money to get more. Patient refused        Stress    Do you feel stress - tense, restless, nervous, or anxious, or unable to sleep at night because your mind is troubled all the time - these days? Patient refused        Social Connections    In a typical week, how many times do you talk on the phone with family, friends, or neighbors? Patient refused     How often do you get together with friends or relatives? Patient refused     How often do you attend Druze or Zoroastrianism services? Patient refused     How often do you attend meetings of the clubs or organizations you belong to? Patient refused     Are you , , , , never , or living with a partner? Patient refused        Alcohol Use    Q1: How often do you have a drink containing alcohol? Patient refused     Q2: How many drinks containing alcohol do you have on a typical day when you are drinking? Patient refused     Q3: How often do you have six or more drinks on one occasion? Patient refused

## 2022-10-02 NOTE — PLAN OF CARE
Problem: Adult Inpatient Plan of Care  Goal: Plan of Care Review  Outcome: Ongoing, Progressing  Flowsheets (Taken 10/2/2022 0428)  Plan of Care Reviewed With: patient  Goal: Absence of Hospital-Acquired Illness or Injury  Outcome: Ongoing, Progressing  Intervention: Identify and Manage Fall Risk  Flowsheets (Taken 10/2/2022 0428)  Safety Promotion/Fall Prevention:   assistive device/personal item within reach   Fall Risk reviewed with patient/family   nonskid shoes/socks when out of bed   side rails raised x 2   instructed to call staff for mobility  Intervention: Prevent Skin Injury  Flowsheets (Taken 10/2/2022 0428)  Body Position: position changed independently  Intervention: Prevent and Manage VTE (Venous Thromboembolism) Risk  Flowsheets (Taken 10/2/2022 0428)  Activity Management: Ambulated -L4  VTE Prevention/Management: ambulation promoted  Intervention: Prevent Infection  Flowsheets (Taken 10/2/2022 0428)  Infection Prevention:   cohorting utilized   hand hygiene promoted   rest/sleep promoted   single patient room provided     Problem: Diarrhea (Gastroenteritis)  Goal: Effective Diarrhea Management  Outcome: Ongoing, Progressing  Intervention: Manage Diarrhea  Flowsheets (Taken 10/2/2022 0428)  Nutrition Interventions: diet adjusted     Problem: Fluid Imbalance (Gastroenteritis)  Goal: Fluid Balance  Outcome: Ongoing, Progressing  Intervention: Monitor and Manage Fluid Balance  Flowsheets (Taken 10/2/2022 0428)  Fluid/Electrolyte Management: intravenous fluid replacement initiated     Problem: Nausea and Vomiting (Gastroenteritis)  Goal: Nausea and Vomiting Relief  Outcome: Ongoing, Progressing  Intervention: Prevent and Manage Nausea and Vomiting  Flowsheets (Taken 10/2/2022 0428)  Environmental Support:   calm environment promoted   personal routine supported   rest periods encouraged

## 2022-10-02 NOTE — ED NOTES
Spoke with KAREN Emanuel, regarding on call physican. Informed per Jenae that Dr. Gillette can call Dr. Reyes regarding admission on pt.

## 2022-10-02 NOTE — H&P
[facility] Hospital Medicine History & Physical Examination       Patient Name: Sarabjit Ontiveros  MRN: [id number]  Patient Class: OP- Observation   Admission Date: 10/1/2022   Admitting Physician: HM Service   Length of Stay: 0  Attending Physician: Asia Chambers MD  Primary Care Provider: Primary Doctor No  Face-to-Face encounter date: 10/02/2022  Code Status: Full Code.   Chief Complaint: Vomiting (W diarrhea x4  days- multiple episodes. Hx htn - cant hold downhis bp meds- denies hx DM.  )        Patient information was obtained from patient, patient's family, past medical records and ER records.     HISTORY OF PRESENT ILLNESS:   Sarabjit Ontiveros is a 56 y.o. male who  has a past medical history of Chronic pain disorder, Depression, Depression, Hypertension, and Stroke.. The patient presented to [facility] on 10/1/2022 with a primary complaint of multiple episodes of diarrhea, liquid according to patient back-to-back, duration:  4 days.  Context:  He denies any specific meal prior to this.  Modifying factors:  Worse with eating.  Accompanying sinus symptoms: Patient has excruciating headache which is consistent with his migraine headaches frontoparietal area, throbbing, worse with light and noise, also intractable nausea vomiting, abdominal cramping periumbilical, diffuse, moderate , aching, denies fever but he had chills.  Patient denies any sick contact or any food poisoning.    PAST MEDICAL HISTORY:     Past Medical History:   Diagnosis Date    Chronic pain disorder     Depression     Depression     Hypertension     Stroke        PAST SURGICAL HISTORY:   No past surgical history on file.    ALLERGIES:   Patient has no known allergies.    FAMILY HISTORY:   Reviewed and negative  CAD  SOCIAL HISTORY:     Social History     Tobacco Use    Smoking status: Former     Types: Cigarettes    Smokeless tobacco: Not on file   Substance Use Topics    Alcohol use: Not Currently     Comment: quit 3  years ago        HOME MEDICATIONS:     Prior to Admission medications    Medication Sig Start Date End Date Taking? Authorizing Provider   amLODIPine (NORVASC) 10 MG tablet Take 1 tablet (10 mg total) by mouth once daily. 7/20/22  Yes Maikel Owens MD   lisinopriL 10 MG tablet Take 1 tablet (10 mg total) by mouth once daily. 7/20/22  Yes Maikel Owens MD   bisoproloL-hydrochlorothiazide (ZIAC) 10-6.25 mg per tablet Take 1 tablet by mouth once daily. 7/20/22   Maikel Owens MD   OXcarbazepine (TRILEPTAL) 300 MG Tab TAKE ONE TABLET BY MOUTH TWICE DAILY 6/24/22   Trina Nguyễn MD   venlafaxine (EFFEXOR-XR) 75 MG 24 hr capsule TAKE ONE CAPSULE BY MOUTH DAILY 6/24/22   Trina Nguyễn MD       REVIEW OF SYSTEMS:   Except as documented, all other systems reviewed and negative     PHYSICAL EXAM:     VITAL SIGNS: 24 HRS MIN & MAX LAST   Temp  Min: 96.3 °F (35.7 °C)  Max: 98 °F (36.7 °C) 98 °F (36.7 °C)   BP  Min: 111/63  Max: 172/99 (!) 157/87     Pulse  Min: 75  Max: 97  78   Resp  Min: 16  Max: 28 19   SpO2  Min: 95 %  Max: 99 % 97 %       General appearance: Well-developed, well-nourished male in no apparent distress.  HENT: Atraumatic head. Moist mucous membranes of oral cavity.  Eyes: Normal extraocular movements.   Neck: Supple.   Lungs: Clear to auscultation bilaterally. No wheezing present.   Heart: Regular rate and rhythm. S1 and S2 present with no murmurs/gallop/rub. No pedal edema. No JVD present.   Abdomen: Soft, non-distended, diffuse TTP. No rebound tenderness/guarding. Bowel sounds are normal.   Extremities: No cyanosis, clubbing, or edema.  Skin: No Rash.   Neuro: Motor and sensory exams grossly intact. Good tone. Muscle strength 5/5 in all 4 extremities  Psych/mental status: Appropriate mood and affect. Responds appropriately to questions.     LABS AND IMAGING:     Recent Labs   Lab 10/01/22  2159   WBC 7.1   RBC 5.09   HGB 14.1   HCT 40.9*   MCV 80.4   MCH 27.7   MCHC 34.5   RDW  12.8      MPV 10.0       Recent Labs   Lab 10/01/22  2159      K 3.5   CO2 19*   BUN 23.6   CREATININE 1.11   CALCIUM 9.5   ALBUMIN 4.1   ALKPHOS 142   ALT 21   AST 18   BILITOT 0.2       Microbiology Results (last 7 days)       Procedure Component Value Units Date/Time    Stool Culture [[id number]] Collected: 10/02/22 1219    Order Status: Sent Specimen: Stool Updated: 10/02/22 1226    Clostridium Diff Toxin, A & B, EIA [[id number]] Collected: 10/02/22 1219    Order Status: Sent Specimen: Stool Updated: 10/02/22 1225             CT Abdomen Pelvis With Contrast  Narrative: Technique:CT of the abdomen and pelvis was performed with axial images as well as sagittal and coronal reconstruction images with intravenous contrast.    Comparison:None available.    Clinical History:Vomiting (W diarrhea x4 days- multiple episodes. Hx htn - cant hold downhis bp meds- denies hx DM. ).    Dosage Information:Automated Exposure Control was utilized 1329.41 mGy.cm.    Findings:    Lines and Tubes:None.    Thorax:    Lungs:The visualized lung bases appear unremarkable.    Pleura:No effusions or thickening. No pneumothorax is seen in the visualized lung bases.    Abdomen:    Abdominal Wall:There is a small uncomplicated umbilical hernia which contains mesenteric fat.    Liver:The liver appears unremarkable.    Biliary System:No intrahepatic or extrahepatic biliary duct dilatation is seen.    Gallbladder:The gallbladder is non-distended and appears otherwise unremarkable.    Pancreas:The pancreas appears unremarkable.    Spleen: Spleen is enlarged in size maximum diameter of 15.8 cm.    Adrenals:The adrenal glands appear unremarkable.    Kidneys:The kidneys appear unremarkable with no stones cysts masses or hydronephrosis.    Aorta:There is moderate calcification of the abdominal aorta and its branches.    IVC:Unremarkable.    Bowel:There are a few loops of fluid-filled small bowel in the pelvis with nearly the entire colon  full but nondistended with suggestion of mild mucosal enhancement.    Esophagus:The visualized esophagus appears unremarkable.    Stomach:The stomach appears unremarkable.    Duodenum:Unremarkable appearing duodenum.    Appendix:The appendix appears unremarkable and is seen on image 73, Series 3.    Peritoneum:No intraperitoneal free air or ascites is seen.    Pelvis:    Bladder:The bladder is nondistended.    Male:    Prostate gland:There are a few calcifications in the prostate gland.    Bony structures:    Dorsal Spine:There is spondylosis of the visualized dorsal spine.    Bony Pelvis:The visualized bony structures of the pelvis appear unremarkable.  Impression: Impression:    1. There are a few loops of fluid-filled small bowel in the pelvis with nearly the entire colon full but nondistended with suggestion of mild mucosal enhancement. These findings could reflect an element of the enterocolitis. Correlate clinically as regards additional evaluation and follow-up.    2.  Splenomegaly.    No significant discrepancy with overnight report.    Electronically signed by: Aris Linda  Date:    10/02/2022  Time:    07:26        ASSESSMENT & PLAN:     Acute gastroenteritis, infectious versus no infectious  Moderate dehydration   Migraine headaches  Non anion gap metabolic acidosis  Intractable nausea and vomiting   Chronic pain syndrome   History of hemorrhagic CVA of right temporal area   HTN  Previous history of amphetamine abuse, Enterococcus screen pending.      Plan:    Admit to Obs.  IVF   Rocephin/Flagyl  Stool studies   Headache cocktail  Fioricet p.r.n.   Antiemetics   Pain management      VTE Prophylaxis: will be placed on Heparin/SCD for DVT prophylaxis and will be advised to be as mobile as possible and sit in a chair as tolerated    Patient condition:  Fair    __________________________________________________________________________  INPATIENT LIST OF MEDICATIONS     Scheduled Meds:   cefTRIAXone  (ROCEPHIN) IVPB  1 g Intravenous Q24H    magnesium sulfate IVPB  1 g Intravenous Once    metronidazole  500 mg Intravenous Q8H     Continuous Infusions:   dextrose 5 % and 0.9 % NaCl 100 mL/hr at 10/02/22 0938     PRN Meds:.acetaminophen, butalbital-acetaminophen-caffeine -40 mg, melatonin, morphine, ondansetron, ondansetron, sodium chloride 0.9%          Discharge Planning and Disposition: No mobility needs. Ambulating well. Good social support system.   Anticipated discharge    All diagnosis and differential diagnosis have been reviewed; assessment and plan has been documented; I have personally reviewed the labs and test results that are presently available; I have reviewed the patients medication list; I have reviewed the consulting providers response and recommendations. I have reviewed or attempted to review medical records based upon their availability.    All of the patient and family questions have been addressed and answered. Patient's is agreeable to the above stated plan. I will continue to monitor closely and make adjustments to medical management as needed.      Asia Chambers MD   10/02/2022

## 2022-10-03 VITALS
TEMPERATURE: 98 F | DIASTOLIC BLOOD PRESSURE: 94 MMHG | HEART RATE: 77 BPM | BODY MASS INDEX: 28.41 KG/M2 | WEIGHT: 191.81 LBS | OXYGEN SATURATION: 99 % | RESPIRATION RATE: 18 BRPM | HEIGHT: 69 IN | SYSTOLIC BLOOD PRESSURE: 160 MMHG

## 2022-10-03 PROBLEM — R11.2 NAUSEA AND VOMITING: Status: ACTIVE | Noted: 2022-10-03

## 2022-10-03 PROBLEM — R03.0 ELEVATED BLOOD PRESSURE READING: Status: ACTIVE | Noted: 2022-10-03

## 2022-10-03 PROBLEM — G43.909 MIGRAINE HEADACHE: Status: ACTIVE | Noted: 2022-10-03

## 2022-10-03 PROBLEM — K52.9 ACUTE GASTROENTERITIS: Status: ACTIVE | Noted: 2022-10-03

## 2022-10-03 LAB
ALBUMIN SERPL-MCNC: 2.9 GM/DL (ref 3.5–5)
ALBUMIN/GLOB SERPL: 1.2 RATIO (ref 1.1–2)
ALP SERPL-CCNC: 94 UNIT/L (ref 40–150)
ALT SERPL-CCNC: 12 UNIT/L (ref 0–55)
AST SERPL-CCNC: 9 UNIT/L (ref 5–34)
BASOPHILS # BLD AUTO: 0 X10(3)/MCL (ref 0–0.2)
BASOPHILS NFR BLD AUTO: 0 %
BILIRUBIN DIRECT+TOT PNL SERPL-MCNC: 0.2 MG/DL
BUN SERPL-MCNC: 14.3 MG/DL (ref 8.4–25.7)
CALCIUM SERPL-MCNC: 8.3 MG/DL (ref 8.4–10.2)
CHLORIDE SERPL-SCNC: 111 MMOL/L (ref 98–107)
CO2 SERPL-SCNC: 18 MMOL/L (ref 22–29)
CREAT SERPL-MCNC: 1.03 MG/DL (ref 0.73–1.18)
CRP SERPL-MCNC: 1 MG/L
EOSINOPHIL # BLD AUTO: 0.12 X10(3)/MCL (ref 0–0.9)
EOSINOPHIL NFR BLD AUTO: 2.1 %
ERYTHROCYTE [DISTWIDTH] IN BLOOD BY AUTOMATED COUNT: 12.8 % (ref 11.5–17)
FECAL LEUKOCYTE (OHS): POSITIVE
GFR SERPLBLD CREATININE-BSD FMLA CKD-EPI: >60 MLS/MIN/1.73/M2
GLOBULIN SER-MCNC: 2.5 GM/DL (ref 2.4–3.5)
GLUCOSE SERPL-MCNC: 135 MG/DL (ref 74–100)
HAV IGM SERPL QL IA: NONREACTIVE
HBV CORE IGM SERPL QL IA: NONREACTIVE
HBV SURFACE AG SERPL QL IA: NONREACTIVE
HCT VFR BLD AUTO: 34 % (ref 42–52)
HCV AB SERPL QL IA: NONREACTIVE
HGB BLD-MCNC: 11.8 GM/DL (ref 14–18)
IMM GRANULOCYTES # BLD AUTO: 0.01 X10(3)/MCL (ref 0–0.04)
IMM GRANULOCYTES NFR BLD AUTO: 0.2 %
LDH SERPL-CCNC: 119 U/L (ref 125–220)
LEUKO NEG CONT (OHS): NEGATIVE
LEUKO POS CONT (OHS): POSITIVE
LYMPHOCYTES # BLD AUTO: 0.74 X10(3)/MCL (ref 0.6–4.6)
LYMPHOCYTES NFR BLD AUTO: 13.1 %
MAGNESIUM SERPL-MCNC: 1.5 MG/DL (ref 1.6–2.6)
MCH RBC QN AUTO: 27.7 PG (ref 27–31)
MCHC RBC AUTO-ENTMCNC: 34.7 MG/DL (ref 33–36)
MCV RBC AUTO: 79.8 FL (ref 80–94)
MONOCYTES # BLD AUTO: 0.39 X10(3)/MCL (ref 0.1–1.3)
MONOCYTES NFR BLD AUTO: 6.9 %
NEUTROPHILS # BLD AUTO: 4.4 X10(3)/MCL (ref 2.1–9.2)
NEUTROPHILS NFR BLD AUTO: 77.7 %
PHOSPHATE SERPL-MCNC: 2.2 MG/DL (ref 2.3–4.7)
PLATELET # BLD AUTO: 201 X10(3)/MCL (ref 130–400)
PMV BLD AUTO: 9.2 FL (ref 7.4–10.4)
POTASSIUM SERPL-SCNC: 3.8 MMOL/L (ref 3.5–5.1)
PROT SERPL-MCNC: 5.4 GM/DL (ref 6.4–8.3)
RBC # BLD AUTO: 4.26 X10(6)/MCL (ref 4.7–6.1)
SODIUM SERPL-SCNC: 138 MMOL/L (ref 136–145)
WBC # SPEC AUTO: 5.7 X10(3)/MCL (ref 4.5–11.5)

## 2022-10-03 PROCEDURE — 80074 ACUTE HEPATITIS PANEL: CPT | Performed by: HOSPITALIST

## 2022-10-03 PROCEDURE — 83735 ASSAY OF MAGNESIUM: CPT | Performed by: HOSPITALIST

## 2022-10-03 PROCEDURE — 80053 COMPREHEN METABOLIC PANEL: CPT | Performed by: HOSPITALIST

## 2022-10-03 PROCEDURE — G0378 HOSPITAL OBSERVATION PER HR: HCPCS

## 2022-10-03 PROCEDURE — 25000003 PHARM REV CODE 250: Performed by: HOSPITALIST

## 2022-10-03 PROCEDURE — 96372 THER/PROPH/DIAG INJ SC/IM: CPT | Performed by: HOSPITALIST

## 2022-10-03 PROCEDURE — 96361 HYDRATE IV INFUSION ADD-ON: CPT

## 2022-10-03 PROCEDURE — 85025 COMPLETE CBC W/AUTO DIFF WBC: CPT | Performed by: HOSPITALIST

## 2022-10-03 PROCEDURE — 96366 THER/PROPH/DIAG IV INF ADDON: CPT

## 2022-10-03 PROCEDURE — 63600175 PHARM REV CODE 636 W HCPCS: Performed by: HOSPITALIST

## 2022-10-03 PROCEDURE — 84145 PROCALCITONIN (PCT): CPT | Performed by: HOSPITALIST

## 2022-10-03 PROCEDURE — 83615 LACTATE (LD) (LDH) ENZYME: CPT | Performed by: HOSPITALIST

## 2022-10-03 PROCEDURE — 86140 C-REACTIVE PROTEIN: CPT | Performed by: HOSPITALIST

## 2022-10-03 PROCEDURE — 84100 ASSAY OF PHOSPHORUS: CPT | Performed by: HOSPITALIST

## 2022-10-03 PROCEDURE — 36415 COLL VENOUS BLD VENIPUNCTURE: CPT | Performed by: HOSPITALIST

## 2022-10-03 PROCEDURE — S0030 INJECTION, METRONIDAZOLE: HCPCS | Performed by: HOSPITALIST

## 2022-10-03 RX ORDER — BUTALBITAL, ACETAMINOPHEN AND CAFFEINE 50; 325; 40 MG/1; MG/1; MG/1
1 TABLET ORAL EVERY 6 HOURS PRN
Qty: 30 TABLET | Refills: 0 | Status: SHIPPED | OUTPATIENT
Start: 2022-10-03 | End: 2022-11-02

## 2022-10-03 RX ORDER — ACETAMINOPHEN 325 MG/1
650 TABLET ORAL EVERY 8 HOURS PRN
Refills: 0
Start: 2022-10-03

## 2022-10-03 RX ORDER — AMOXICILLIN AND CLAVULANATE POTASSIUM 875; 125 MG/1; MG/1
1 TABLET, FILM COATED ORAL EVERY 12 HOURS
Qty: 10 TABLET | Refills: 0 | Status: SHIPPED | OUTPATIENT
Start: 2022-10-03 | End: 2022-10-08

## 2022-10-03 RX ORDER — RIZATRIPTAN BENZOATE 10 MG/1
10 TABLET ORAL
Qty: 30 TABLET | Refills: 3 | Status: ON HOLD | OUTPATIENT
Start: 2022-10-03 | End: 2023-08-28 | Stop reason: HOSPADM

## 2022-10-03 RX ORDER — LANOLIN ALCOHOL/MO/W.PET/CERES
400 CREAM (GRAM) TOPICAL DAILY
Qty: 30 TABLET | Refills: 0 | Status: SHIPPED | OUTPATIENT
Start: 2022-10-03 | End: 2022-11-02

## 2022-10-03 RX ADMIN — BUTALBITAL, ACETAMINOPHEN AND CAFFEINE 1 TABLET: 50; 325; 40 TABLET ORAL at 03:10

## 2022-10-03 RX ADMIN — HEPARIN SODIUM 5000 UNITS: 5000 INJECTION, SOLUTION INTRAVENOUS; SUBCUTANEOUS at 11:10

## 2022-10-03 RX ADMIN — METRONIDAZOLE 500 MG: 500 INJECTION, SOLUTION INTRAVENOUS at 01:10

## 2022-10-03 NOTE — PLAN OF CARE
[facility]  Discharge Final Note    Primary Care Provider: Primary Doctor No    Expected Discharge Date: 10/3/2022    Final Discharge Note (most recent)       Final Note - 10/03/22 1521          Final Note    Assessment Type Final Discharge Note     Anticipated Discharge Disposition Home or Self Care     What phone number can be called within the next 1-3 days to see how you are doing after discharge? [phone]     Hospital Resources/Appts/Education Provided Provided patient/caregiver with written discharge plan information        Post-Acute Status    Discharge Delays None known at this time                     Important Message from Medicare             Contact Info       No, Primary Doctor   Relationship: PCP - General        Next Steps: Follow up in 1 week(s)    Arjun Bennett MD   Specialty: Family Medicine    [address]  [place] [address]   Phone: [phone]       Next Steps: Go on 10/11/2022    Instructions: 10/11/22 @ 12:00noon  Spoke to Treva

## 2022-10-03 NOTE — PROGRESS NOTES
Patient wheeled to private vehicle by staff and discharged to home with all belongings at 1335. AVS printed and given to patient. Discharge instructions including follow up appointment and medications reviewed with patient, all questions answered. Script for Fioricet given to patient, other medications sent to pharmacy for patient to . IV d/c'd prior to discharge. Patient on room air and in no distress at discharge.

## 2022-10-03 NOTE — DISCHARGE SUMMARY
[facility] Medicine Discharge Summary    Admit Date: 10/1/2022  Discharge Date and Time: 10/3/720827:23 PM  Admitting Physician:  Team  Discharging Physician: Asia Chambers MD.  Primary Care Physician: Primary Doctor No  Consults: None    Discharge Diagnoses:  Acute gastroenteritis, infectious versus no infectious, PCT pending  Moderate dehydration   Migraine headaches  Non anion gap metabolic acidosis  Intractable nausea and vomiting   Chronic pain syndrome   History of hemorrhagic CVA of right temporal area . CT head stable.  HTN  Previous history of amphetamine abuse, UDS neg, Opiates related to ED meds.      Hospital Course:   Patient admitted for hydration and pain control, since admission he has no had more diarrhea nausea vomiting, he tolerated diet, blood Work not revealing, procalcitonin is pending, afebrile, he did have intractable migraine received Depakote, magnesium, Toradol, Compazine, Solu-Medrol with improvement of headache paresthesia requiring p.r.n. Fioricet, repeat CT of the head did not show any acute pathology but is stable changes.  Patient headache improved advised to use migraine medicines and not to over use any kind of pain medication, follow with PCP based on hisage  recommend him to have a colonoscopy for screening, patient medically stable for discharge.      Pt was seen and examined on the day of discharge  Vitals:  VITAL SIGNS: 24 HRS MIN & MAX LAST   Temp  Min: 96.3 °F (35.7 °C)  Max: 98.9 °F (37.2 °C) 98.2 °F (36.8 °C)   BP  Min: 144/89  Max: 171/86 (!) 160/94     Pulse  Min: 74  Max: 88  77   Resp  Min: 16  Max: 18 16   SpO2  Min: 95 %  Max: 99 % 99 %       Physical Exam:  Physical Exam  Constitutional:       Appearance: Normal appearance.   HENT:      Head: Normocephalic and atraumatic.      Nose: Nose normal.   Eyes:      Extraocular Movements: Extraocular movements intact.      Pupils: Pupils are equal, round, and reactive to  light.   Cardiovascular:      Rate and Rhythm: Normal rate and regular rhythm.      Heart sounds: No murmur heard.    No gallop.   Pulmonary:      Effort: Pulmonary effort is normal. No respiratory distress.      Breath sounds: Normal breath sounds. No wheezing.   Abdominal:      General: Abdomen is flat. Bowel sounds are normal. There is no distension.      Palpations: Abdomen is soft.      Tenderness: There is no abdominal tenderness.   Musculoskeletal:         General: Normal range of motion.      Cervical back: Normal range of motion and neck supple.   Skin:     General: Skin is warm and dry.      Comments: Lipoma right upper back 4 cm   Neurological:      General: No focal deficit present.      Mental Status: He is alert and oriented to person, place, and time. Mental status is at baseline.   Psychiatric:      Comments: Very anxious and attention seeking behavior.        Procedures Performed: No admission procedures for hospital encounter.     Significant Diagnostic Studies: See Full reports for all details    Recent Labs   Lab 10/01/22  2159 10/03/22  0600   WBC 7.1 5.7   RBC 5.09 4.26*   HGB 14.1 11.8*   HCT 40.9* 34.0*   MCV 80.4 79.8*   MCH 27.7 27.7   MCHC 34.5 34.7   RDW 12.8 12.8    201   MPV 10.0 9.2       Recent Labs   Lab 10/01/22  2159 10/03/22  0600    138   K 3.5 3.8   CO2 19* 18*   BUN 23.6 14.3   CREATININE 1.11 1.03   CALCIUM 9.5 8.3*   MG  --  1.50*   ALBUMIN 4.1 2.9*   ALKPHOS 142 94   ALT 21 12   AST 18 9   BILITOT 0.2 0.2        Microbiology Results (last 7 days)       Procedure Component Value Units Date/Time    Stool Culture [[id number]]  (Normal) Collected: 10/02/22 1219    Order Status: Completed Specimen: Stool Updated: 10/03/22 0927     Stool Culture Negative for Salmonella, Shigella, Campylobacter, Vibrio, Aeromonas, Pleisiomonas,Yersinia, or Shiga Toxin 1 and 2.    Clostridium Diff Toxin, A & B, EIA [[id number]]  (Normal) Collected: 10/02/22 1219    Order Status: Completed  Specimen: Stool Updated: 10/02/22 1747     Clostridium Difficile GDH Antigen Negative     Clostridium Difficile Toxin A/B Negative             CT Head Without Contrast  Narrative: EXAMINATION:  CT HEAD WITHOUT CONTRAST    CLINICAL HISTORY:  Headache, chronic, new features or increased frequency;Migraine without aura, intractable, without status migrainosus    TECHNIQUE:  Sequential axial images were performed of the brain without contrast.    Dose product length of 1243 mGycm. Automated exposure control was utilized to minimize radiation dose.    COMPARISON:  CT brain July 20, 2022 and October 17, 2017.    FINDINGS:  Left occipital lobe periventricular small hyperdensity remains stable in size and appearance and again suggests a cavernoma.  There is no intracranial mass effect, midline shift, hydrocephalus or hemorrhage. There is no sulcal effacement or low attenuation changes to suggest recent large vessel territory infarction.  There is mild chronic microangiopathic ischemia and generalized cerebral cortical volume loss.  There is no acute extra axial fluid collection. Visualized paranasal sinuses are clear without mucosal thickening, polypoidal abnormality or air-fluid levels. Mastoid air cells aeration is optimal.  Impression: 1.  No acute intracranial findings identified.    2.  Left occipital lobe periventricular hyperdensity remains stable and again suggests a cavernoma.    Electronically signed by: Aris Linda  Date:    10/03/2022  Time:    09:53         Medication List        START taking these medications      acetaminophen 325 MG tablet  Commonly known as: TYLENOL  Take 2 tablets (650 mg total) by mouth every 8 (eight) hours as needed.     amoxicillin-clavulanate 875-125mg 875-125 mg per tablet  Commonly known as: AUGMENTIN  Take 1 tablet by mouth every 12 (twelve) hours. for 5 days     butalbital-acetaminophen-caffeine -40 mg -40 mg per tablet  Commonly known as: FIORICET, ESGIC  Take 1  tablet by mouth every 6 (six) hours as needed for Headaches.     magnesium oxide 400 mg (241.3 mg magnesium) tablet  Commonly known as: MAG-OX  Take 1 tablet (400 mg total) by mouth once daily.     rizatriptan 10 MG tablet  Commonly known as: MAXALT  Take 1 tablet (10 mg total) by mouth as needed for Migraine.            CONTINUE taking these medications      amLODIPine 10 MG tablet  Commonly known as: NORVASC  Take 1 tablet (10 mg total) by mouth once daily.     bisoproloL-hydrochlorothiazide 10-6.25 mg per tablet  Commonly known as: ZIAC  Take 1 tablet by mouth once daily.     lisinopriL 10 MG tablet  Take 1 tablet (10 mg total) by mouth once daily.     OXcarbazepine 300 MG Tab  Commonly known as: TRILEPTAL  TAKE ONE TABLET BY MOUTH TWICE DAILY     venlafaxine 75 MG 24 hr capsule  Commonly known as: EFFEXOR-XR  TAKE ONE CAPSULE BY MOUTH DAILY               Where to Get Your Medications        These medications were sent to [facility] [address] [place] [address] [address] [address] [facility] [address]      Phone: [phone]   amoxicillin-clavulanate 875-125mg 875-125 mg per tablet  magnesium oxide 400 mg (241.3 mg magnesium) tablet  rizatriptan 10 MG tablet       You can get these medications from any pharmacy    Bring a paper prescription for each of these medications  butalbital-acetaminophen-caffeine -40 mg -40 mg per tablet       Information about where to get these medications is not yet available    Ask your nurse or doctor about these medications  acetaminophen 325 MG tablet          Explained in detail to the patient about the discharge plan, medications, and follow-up visits. Pt understands and agrees with the treatment plan  Discharge Disposition: Home or Self Care   Discharged Condition: stable  Diet-   Dietary Orders (From admission, onward)       Start     Ordered    10/03/22 0354  Diet Adult Regular  Diet effective now         10/03/22 0354                    Medications Per DC med rec  Activities as tolerated   Follow-up Information       Primary Doctor No Follow up in 1 week(s).                           For further questions contact hospitalist office    Discharge time 25 minutes    For worsening symptoms, chest pain, shortness of breath, increased abdominal pain, high grade fever, stroke or stroke like symptoms, immediately go to the nearest Emergency Room or call 911 as soon as possible.      Asia Diaz M.D, on 10/3/2022. at 12:23 PM.

## 2022-10-03 NOTE — PLAN OF CARE
Problem: Adult Inpatient Plan of Care  Goal: Plan of Care Review  Outcome: Adequate for Care Transition  Goal: Patient-Specific Goal (Individualized)  Outcome: Adequate for Care Transition  Goal: Absence of Hospital-Acquired Illness or Injury  Outcome: Adequate for Care Transition  Goal: Optimal Comfort and Wellbeing  Outcome: Adequate for Care Transition  Goal: Readiness for Transition of Care  Outcome: Adequate for Care Transition     Problem: Diarrhea (Gastroenteritis)  Goal: Effective Diarrhea Management  Outcome: Adequate for Care Transition     Problem: Fluid Imbalance (Gastroenteritis)  Goal: Fluid Balance  Outcome: Adequate for Care Transition     Problem: Nausea and Vomiting (Gastroenteritis)  Goal: Nausea and Vomiting Relief  Outcome: Adequate for Care Transition     Problem: Infection  Goal: Absence of Infection Signs and Symptoms  Outcome: Adequate for Care Transition     Problem: Fall Injury Risk  Goal: Absence of Fall and Fall-Related Injury  Outcome: Adequate for Care Transition

## 2022-10-03 NOTE — PLAN OF CARE
Problem: Adult Inpatient Plan of Care  Goal: Plan of Care Review  Outcome: Ongoing, Progressing  Flowsheets (Taken 10/2/2022 2000)  Plan of Care Reviewed With: patient  Goal: Absence of Hospital-Acquired Illness or Injury  Outcome: Ongoing, Progressing  Intervention: Identify and Manage Fall Risk  Flowsheets (Taken 10/2/2022 2000)  Safety Promotion/Fall Prevention:   assistive device/personal item within reach   bed alarm refused   Fall Risk reviewed with patient/family   nonskid shoes/socks when out of bed   side rails raised x 2   supervised activity   Supervised toileting - stay within arms reach   instructed to call staff for mobility  Intervention: Prevent Skin Injury  Flowsheets (Taken 10/2/2022 2000)  Body Position: position changed independently  Intervention: Prevent and Manage VTE (Venous Thromboembolism) Risk  Flowsheets (Taken 10/2/2022 2000)  Activity Management: Ambulated -L4  VTE Prevention/Management: ambulation promoted  Intervention: Prevent Infection  Flowsheets (Taken 10/2/2022 2000)  Infection Prevention:   cohorting utilized   hand hygiene promoted   rest/sleep promoted   single patient room provided     Problem: Diarrhea (Gastroenteritis)  Goal: Effective Diarrhea Management  Outcome: Ongoing, Progressing  Intervention: Manage Diarrhea  Flowsheets (Taken 10/2/2022 2000)  Nutrition Interventions:   supplemental foods provided   diet adjusted     Problem: Fluid Imbalance (Gastroenteritis)  Goal: Fluid Balance  Outcome: Ongoing, Progressing  Intervention: Monitor and Manage Fluid Balance  Flowsheets (Taken 10/2/2022 2000)  Fluid/Electrolyte Management: intravenous fluids adjusted     Problem: Nausea and Vomiting (Gastroenteritis)  Goal: Nausea and Vomiting Relief  Outcome: Ongoing, Progressing  Intervention: Prevent and Manage Nausea and Vomiting  Flowsheets (Taken 10/2/2022 2000)  Nausea/Vomiting Interventions:   sips of clear liquids given   nausea triggers minimized  Environmental  Support:   calm environment promoted   rest periods encouraged

## 2022-10-05 LAB
BACTERIA STL CULT: NORMAL
PATH REV: NORMAL
PROCALCITONIN SERPL-MCNC: <0.1 NG/ML
PROCALCITONIN SERPL-MCNC: <0.1 NG/ML

## 2022-11-24 ENCOUNTER — HOSPITAL ENCOUNTER (EMERGENCY)
Facility: HOSPITAL | Age: 57
Discharge: HOME | End: 2022-11-24
Attending: EMERGENCY MEDICINE
Payer: MEDICAID

## 2022-11-24 VITALS
OXYGEN SATURATION: 97 % | SYSTOLIC BLOOD PRESSURE: 129 MMHG | HEART RATE: 65 BPM | RESPIRATION RATE: 16 BRPM | HEIGHT: 69 IN | BODY MASS INDEX: 28.44 KG/M2 | WEIGHT: 192 LBS | DIASTOLIC BLOOD PRESSURE: 72 MMHG | TEMPERATURE: 97 F

## 2022-11-24 DIAGNOSIS — S61.422A LACERATION OF LEFT HAND WITH FOREIGN BODY, INITIAL ENCOUNTER: Primary | ICD-10-CM

## 2022-11-24 PROCEDURE — 63600175 PHARM REV CODE 636 W HCPCS: Performed by: PHYSICIAN ASSISTANT

## 2022-11-24 PROCEDURE — 90715 TDAP VACCINE 7 YRS/> IM: CPT | Performed by: PHYSICIAN ASSISTANT

## 2022-11-24 PROCEDURE — 12042 INTMD RPR N-HF/GENIT2.6-7.5: CPT

## 2022-11-24 PROCEDURE — 90471 IMMUNIZATION ADMIN: CPT | Performed by: PHYSICIAN ASSISTANT

## 2022-11-24 PROCEDURE — 99284 EMERGENCY DEPT VISIT MOD MDM: CPT | Mod: 25

## 2022-11-24 PROCEDURE — 25000003 PHARM REV CODE 250: Performed by: PHYSICIAN ASSISTANT

## 2022-11-24 RX ORDER — LIDOCAINE HYDROCHLORIDE 10 MG/ML
5 INJECTION, SOLUTION EPIDURAL; INFILTRATION; INTRACAUDAL; PERINEURAL
Status: COMPLETED | OUTPATIENT
Start: 2022-11-24 | End: 2022-11-24

## 2022-11-24 RX ORDER — HYDROCODONE BITARTRATE AND ACETAMINOPHEN 5; 325 MG/1; MG/1
1 TABLET ORAL EVERY 6 HOURS PRN
Qty: 8 TABLET | Refills: 0 | Status: SHIPPED | OUTPATIENT
Start: 2022-11-24 | End: 2022-11-26

## 2022-11-24 RX ORDER — CEPHALEXIN 500 MG/1
500 CAPSULE ORAL 4 TIMES DAILY
Qty: 20 CAPSULE | Refills: 0 | Status: SHIPPED | OUTPATIENT
Start: 2022-11-24 | End: 2022-11-29

## 2022-11-24 RX ORDER — HYDROCODONE BITARTRATE AND ACETAMINOPHEN 5; 325 MG/1; MG/1
1 TABLET ORAL
Status: COMPLETED | OUTPATIENT
Start: 2022-11-24 | End: 2022-11-24

## 2022-11-24 RX ORDER — LIDOCAINE HYDROCHLORIDE AND EPINEPHRINE 10; 10 MG/ML; UG/ML
10 INJECTION, SOLUTION INFILTRATION; PERINEURAL ONCE
Status: DISCONTINUED | OUTPATIENT
Start: 2022-11-24 | End: 2022-11-24

## 2022-11-24 RX ADMIN — HYDROCODONE BITARTRATE AND ACETAMINOPHEN 1 TABLET: 5; 325 TABLET ORAL at 11:11

## 2022-11-24 RX ADMIN — TETANUS TOXOID, REDUCED DIPHTHERIA TOXOID AND ACELLULAR PERTUSSIS VACCINE, ADSORBED 0.5 ML: 5; 2.5; 8; 8; 2.5 SUSPENSION INTRAMUSCULAR at 11:11

## 2022-11-24 RX ADMIN — LIDOCAINE HYDROCHLORIDE 50 MG: 10 INJECTION, SOLUTION EPIDURAL; INFILTRATION; INTRACAUDAL; PERINEURAL at 11:11

## 2022-11-24 NOTE — DISCHARGE INSTRUCTIONS
Keep wound clean and dry.  You may apply Neosporin ointment.  Take Motrin as needed for pain.  Take the Keflex 3 times a day for 10 days.  Take the Norco 1 tablet every 6 hours as needed for pain.  Follow-up with your primary care doctor on Monday for recheck.  Return to the ER for worsening symptoms or condition.  Signs and symptoms of infection include redness, swelling, drainage from wound.  If no primary care doctor you may follow-up with the [facility] call [phone].  Sutures need to be removed in 10 days.

## 2022-11-24 NOTE — ED PROVIDER NOTES
Encounter Date: 11/24/2022       History     Chief Complaint   Patient presents with    Extremity Laceration     Pt presents with laceration to left dorsal hand; pt was gutting a house, when he was pushing out a window and it broke on him; bleeding controlled at this time      Patient presents to ER today with complaint of laceration to the dorsal aspect of his left hand.  Patient was doing demolition on a trailer and cut his hand on the window.  Bleeding is controlled.  Last tetanus unknown.    Review of patient's allergies indicates:  No Known Allergies  Past Medical History:   Diagnosis Date    Chronic pain disorder     Depression     Depression     Hypertension     Stroke      No past surgical history on file.  Family History   Problem Relation Age of Onset    Coronary artery disease Mother     Coronary artery disease Father      Social History     Tobacco Use    Smoking status: Former     Types: Cigarettes   Substance Use Topics    Alcohol use: Not Currently     Comment: quit 3 years ago    Drug use: Not Currently     Review of Systems   Skin:         4 cm laceration dorsal aspect left hand   All other systems reviewed and are negative.    Physical Exam     Initial Vitals [11/24/22 1126]   BP Pulse Resp Temp SpO2   128/80 66 16 97.3 °F (36.3 °C) 98 %      MAP       --         Physical Exam    Nursing note and vitals reviewed.  Constitutional: He appears well-developed and well-nourished.   HENT:   Head: Normocephalic and atraumatic.   Right Ear: External ear normal.   Left Ear: External ear normal.   Nose: Nose normal.   Mouth/Throat: Oropharynx is clear and moist.   Eyes: Conjunctivae and EOM are normal. Pupils are equal, round, and reactive to light.   Neck: Neck supple.   Normal range of motion.  Cardiovascular:  Normal rate, regular rhythm, normal heart sounds and intact distal pulses.           Pulmonary/Chest: Breath sounds normal.   Musculoskeletal:         General: Normal range of motion.      Cervical  back: Normal range of motion and neck supple.     Neurological: He is alert and oriented to person, place, and time. He has normal strength.   Skin: Skin is warm and dry. Capillary refill takes less than 2 seconds.   4 cm linear laceration dorsal aspect left hand.  Bleeding is controlled.  Patient is able to flex and extend his fingers at the MCP PIP and the IP joints.  Cap refill less than 2 seconds.  Radial pulse 2 +.  Ulnar pulse 2 +.  Nail beds pink.   Psychiatric: He has a normal mood and affect. His behavior is normal. Judgment and thought content normal.       ED Course   Lac Repair    Date/Time: 11/24/2022 11:40 AM  Performed by: LAST Herbert  Authorized by: Ti Glez MD     Consent:     Consent obtained:  Verbal    Consent given by:  Patient    Risks, benefits, and alternatives were discussed: yes      Risks discussed:  Infection, pain, retained foreign body, tendon damage and nerve damage  Universal protocol:     Procedure explained and questions answered to patient or proxy's satisfaction: yes      Relevant documents present and verified: yes      Test results available: yes      Imaging studies available: yes      Patient identity confirmed:  Verbally with patient and arm band  Anesthesia:     Anesthesia method:  Local infiltration (Lidocaine 1% plain 2 mL)  Laceration details:     Location:  Hand    Length (cm):  4    Depth (mm):  0.5  Pre-procedure details:     Preparation:  Patient was prepped and draped in usual sterile fashion  Exploration:     Limited defect created (wound extended): no      Hemostasis achieved with:  Tied off vessels and direct pressure    Imaging outcome: foreign body noted      Wound exploration: wound explored through full range of motion and entire depth of wound visualized      Contaminated: yes    Treatment:     Area cleansed with:  Povidone-iodine and saline    Amount of cleaning:  Extensive    Irrigation solution:  Sterile saline    Irrigation method:   Syringe    Visualized foreign bodies/material removed: yes      Debridement:  None    Undermining:  None  Skin repair:     Repair method:  Sutures (2 sutures of 4-0 Vicryl used to tie off bleeding vessel)    Suture material:  Nylon    Suture technique:  Simple interrupted    Number of sutures:  6  Approximation:     Approximation:  Close  Repair type:     Repair type:  Intermediate  Post-procedure details:     Dressing:  Non-adherent dressing and bulky dressing    Procedure completion:  Tolerated well, no immediate complications  Labs Reviewed - No data to display       Imaging Results               X-Ray Hand 3 view Left (Final result)  Result time 11/24/22 11:44:22      Final result by Farrukh Clarke MD (11/24/22 11:44:22)                   Narrative:    EXAMINATION  XR HAND COMPLETE 3 VIEW LEFT    CLINICAL HISTORY  laceration;    TECHNIQUE  A total of 3 images submitted of the hand.    COMPARISON  None available at the time of initial interpretation.    FINDINGS  Advanced regional arthritic changes are noted.  No convincing acutely displaced fracture or dislocation is identified.  No aggressive osseous lesion or periosteal reaction is appreciated.    Punctate radiopacity projects over the anterior hand soft tissues, in the region of the 2nd intermetacarpal space.  No other convincing radiopaque foreign body is identified.  There is no tracking subcutaneous gas.    IMPRESSION  1. Possible radiopaque soft tissue foreign body at the palm, 2nd intermetacarpal space.  2. No other findings to suggest acute soft tissue or osseous abnormality.  3. Advanced regional degenerative changes.  ==========    This report was flagged in Epic as abnormal.      Electronically signed by: Farrukh Clarke  Date:    11/24/2022  Time:    11:44                                     Medications   Tdap (BOOSTRIX) vaccine injection 0.5 mL (0.5 mLs Intramuscular Given 11/24/22 1139)   HYDROcodone-acetaminophen 5-325 mg per tablet 1 tablet (1  tablet Oral Given 11/24/22 1135)   LIDOcaine (PF) 10 mg/ml (1%) injection 50 mg (50 mg Infiltration Given 11/24/22 1134)     Medical Decision Making:   Differential Diagnosis:   Laceration hand, tendon laceration, nerve laceration, foreign body  Clinical Tests:   Radiological Study: Ordered  ED Management:  X-ray was done prior to wound being cleaned and sutured.  Wound was explored to base after home you stasis was obtained.  No foreign bodies were noted.  Patient is to keep the sutures clean and dry.  He may apply antibiotic ointment twice a day.  Take Keflex 1 tablet 3 times a day.  Will give him Norco 1 tablet every 6 hours as needed for pain for 2 days.  Patient is to follow-up with his primary care doctor on Monday.  Signs and symptoms of infection include redness, swelling, drainage from wound.  Fever 100.4 or higher.  Patient was advised if he has any signs of infection he needs to return to the ER sooner.  Otherwise stitches need to come out in 10 days.  Patie may also take Motrin as needed for pain.  Patient verbalized understanding and agrees to treatment plan.                        Clinical Impression:   Final diagnoses:  [L81.422A] Laceration of left hand with foreign body, initial encounter (Primary)             LAST Herbert  11/24/22 2976

## 2022-11-24 NOTE — ED NOTES
Cleaned wound area after closure completed. Pt tolerated well. Applied telfa and covered with gauze and taped to hold. Pt ready for discharge. Bleeding stopped

## 2023-07-13 ENCOUNTER — HOSPITAL ENCOUNTER (OUTPATIENT)
Dept: RADIOLOGY | Facility: HOSPITAL | Age: 58
Discharge: HOME | End: 2023-07-13
Attending: FAMILY MEDICINE
Payer: MEDICAID

## 2023-07-13 ENCOUNTER — APPOINTMENT (OUTPATIENT)
Dept: LAB | Facility: HOSPITAL | Age: 58
End: 2023-07-13
Attending: FAMILY MEDICINE
Payer: MEDICAID

## 2023-07-13 DIAGNOSIS — M25.512 LEFT SHOULDER PAIN: ICD-10-CM

## 2023-07-13 DIAGNOSIS — M25.512 LEFT SHOULDER PAIN: Primary | ICD-10-CM

## 2023-07-13 PROCEDURE — 73030 X-RAY EXAM OF SHOULDER: CPT | Mod: TC,LT

## 2023-07-14 ENCOUNTER — LAB VISIT (OUTPATIENT)
Dept: LAB | Facility: HOSPITAL | Age: 58
End: 2023-07-14
Attending: FAMILY MEDICINE
Payer: MEDICAID

## 2023-07-14 DIAGNOSIS — Z82.49 FAMILY HISTORY OF ISCHEMIC HEART DISEASE: ICD-10-CM

## 2023-07-14 DIAGNOSIS — M25.512 LEFT SHOULDER PAIN: ICD-10-CM

## 2023-07-14 DIAGNOSIS — I10 ESSENTIAL HYPERTENSION, MALIGNANT: ICD-10-CM

## 2023-07-14 DIAGNOSIS — K42.9 UMBILICAL HERNIA WITHOUT OBSTRUCTION OR GANGRENE: ICD-10-CM

## 2023-07-14 LAB
ALBUMIN SERPL-MCNC: 4 G/DL (ref 3.5–5)
ALBUMIN/GLOB SERPL: 1.1 RATIO (ref 1.1–2)
ALP SERPL-CCNC: 122 UNIT/L (ref 40–150)
ALT SERPL-CCNC: 44 UNIT/L (ref 0–55)
APPEARANCE UR: CLEAR
AST SERPL-CCNC: 24 UNIT/L (ref 5–34)
BACTERIA #/AREA URNS AUTO: NORMAL /HPF
BILIRUB UR QL STRIP.AUTO: NEGATIVE
BILIRUBIN DIRECT+TOT PNL SERPL-MCNC: 0.2 MG/DL
BUN SERPL-MCNC: 31.3 MG/DL (ref 8.4–25.7)
CALCIUM SERPL-MCNC: 9.3 MG/DL (ref 8.4–10.2)
CHLORIDE SERPL-SCNC: 104 MMOL/L (ref 98–107)
CHOLEST SERPL-MCNC: 178 MG/DL
CHOLEST/HDLC SERPL: 6 {RATIO} (ref 0–5)
CO2 SERPL-SCNC: 26 MMOL/L (ref 22–29)
COLOR UR: YELLOW
CREAT SERPL-MCNC: 1.26 MG/DL (ref 0.73–1.18)
ERYTHROCYTE [DISTWIDTH] IN BLOOD BY AUTOMATED COUNT: 13.2 % (ref 11.5–17)
GFR SERPLBLD CREATININE-BSD FMLA CKD-EPI: >60 MLS/MIN/1.73/M2
GLOBULIN SER-MCNC: 3.6 GM/DL (ref 2.4–3.5)
GLUCOSE SERPL-MCNC: 126 MG/DL (ref 74–100)
GLUCOSE UR QL STRIP.AUTO: 100
HCT VFR BLD AUTO: 46.4 % (ref 42–52)
HDLC SERPL-MCNC: 30 MG/DL (ref 35–60)
HGB BLD-MCNC: 14.7 G/DL (ref 14–18)
KETONES UR QL STRIP.AUTO: NEGATIVE
LDLC SERPL CALC-MCNC: 68 MG/DL (ref 50–140)
LEUKOCYTE ESTERASE UR QL STRIP.AUTO: NEGATIVE
MCH RBC QN AUTO: 26.8 PG (ref 27–31)
MCHC RBC AUTO-ENTMCNC: 31.7 G/DL (ref 33–36)
MCV RBC AUTO: 84.7 FL (ref 80–94)
NITRITE UR QL STRIP.AUTO: NEGATIVE
PH UR STRIP.AUTO: 5.5 [PH]
PLATELET # BLD AUTO: 211 X10(3)/MCL (ref 130–400)
PMV BLD AUTO: 9.3 FL (ref 7.4–10.4)
POTASSIUM SERPL-SCNC: 4.9 MMOL/L (ref 3.5–5.1)
PROT SERPL-MCNC: 7.6 GM/DL (ref 6.4–8.3)
PROT UR QL STRIP.AUTO: NEGATIVE
PSA SERPL-MCNC: 0.36 NG/ML
RBC # BLD AUTO: 5.48 X10(6)/MCL (ref 4.7–6.1)
RBC #/AREA URNS AUTO: NORMAL /HPF
RBC UR QL AUTO: NEGATIVE
SODIUM SERPL-SCNC: 138 MMOL/L (ref 136–145)
SP GR UR STRIP.AUTO: 1.02
SQUAMOUS #/AREA URNS AUTO: NORMAL /HPF
TRIGL SERPL-MCNC: 398 MG/DL (ref 34–140)
TSH SERPL-ACNC: 0.52 UIU/ML (ref 0.35–4.94)
UROBILINOGEN UR STRIP-ACNC: 0.2
VLDLC SERPL CALC-MCNC: 80 MG/DL
WBC # SPEC AUTO: 5.46 X10(3)/MCL (ref 4.5–11.5)
WBC #/AREA URNS AUTO: NORMAL /HPF

## 2023-07-14 PROCEDURE — 81001 URINALYSIS AUTO W/SCOPE: CPT

## 2023-07-14 PROCEDURE — 80053 COMPREHEN METABOLIC PANEL: CPT

## 2023-07-14 PROCEDURE — 85027 COMPLETE CBC AUTOMATED: CPT

## 2023-07-14 PROCEDURE — 84153 ASSAY OF PSA TOTAL: CPT

## 2023-07-14 PROCEDURE — 80061 LIPID PANEL: CPT

## 2023-07-14 PROCEDURE — 36415 COLL VENOUS BLD VENIPUNCTURE: CPT

## 2023-07-14 PROCEDURE — 84443 ASSAY THYROID STIM HORMONE: CPT

## 2023-07-28 DIAGNOSIS — M25.512 LEFT SHOULDER PAIN: Primary | ICD-10-CM

## 2023-08-02 DIAGNOSIS — M25.512 LEFT SHOULDER PAIN, UNSPECIFIED CHRONICITY: Primary | ICD-10-CM

## 2023-08-07 ENCOUNTER — HOSPITAL ENCOUNTER (OUTPATIENT)
Dept: RADIOLOGY | Facility: HOSPITAL | Age: 58
Discharge: HOME | End: 2023-08-07
Attending: FAMILY MEDICINE
Payer: MEDICAID

## 2023-08-07 ENCOUNTER — OFFICE VISIT (OUTPATIENT)
Dept: ORTHOPEDICS | Facility: CLINIC | Age: 58
End: 2023-08-07
Payer: MEDICAID

## 2023-08-07 VITALS
BODY MASS INDEX: 32.29 KG/M2 | HEIGHT: 69 IN | HEART RATE: 90 BPM | DIASTOLIC BLOOD PRESSURE: 83 MMHG | SYSTOLIC BLOOD PRESSURE: 142 MMHG | WEIGHT: 218 LBS

## 2023-08-07 DIAGNOSIS — M25.512 LEFT SHOULDER PAIN, UNSPECIFIED CHRONICITY: ICD-10-CM

## 2023-08-07 DIAGNOSIS — M75.102 TEAR OF LEFT SUPRASPINATUS TENDON: ICD-10-CM

## 2023-08-07 DIAGNOSIS — M75.42 ROTATOR CUFF IMPINGEMENT SYNDROME OF LEFT SHOULDER: Primary | ICD-10-CM

## 2023-08-07 PROCEDURE — 73030 X-RAY EXAM OF SHOULDER: CPT | Mod: TC,LT

## 2023-08-07 PROCEDURE — 99213 OFFICE O/P EST LOW 20 MIN: CPT | Mod: PBBFAC,25

## 2023-08-07 PROCEDURE — 20610 DRAIN/INJ JOINT/BURSA W/O US: CPT | Mod: PBBFAC | Performed by: STUDENT IN AN ORGANIZED HEALTH CARE EDUCATION/TRAINING PROGRAM

## 2023-08-07 RX ORDER — LIDOCAINE HYDROCHLORIDE 10 MG/ML
5 INJECTION, SOLUTION EPIDURAL; INFILTRATION; INTRACAUDAL; PERINEURAL
Status: COMPLETED | OUTPATIENT
Start: 2023-08-07 | End: 2023-08-07

## 2023-08-07 RX ORDER — TRIAMCINOLONE ACETONIDE 40 MG/ML
40 INJECTION, SUSPENSION INTRA-ARTICULAR; INTRAMUSCULAR
Status: COMPLETED | OUTPATIENT
Start: 2023-08-07 | End: 2023-08-07

## 2023-08-07 RX ADMIN — TRIAMCINOLONE ACETONIDE 40 MG: 40 INJECTION, SUSPENSION INTRA-ARTICULAR; INTRAMUSCULAR at 09:08

## 2023-08-07 RX ADMIN — LIDOCAINE HYDROCHLORIDE 50 MG: 10 INJECTION, SOLUTION EPIDURAL; INFILTRATION; INTRACAUDAL; PERINEURAL at 09:08
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
## 2023-08-24 ENCOUNTER — ANESTHESIA EVENT (OUTPATIENT)
Dept: SURGERY | Facility: HOSPITAL | Age: 58
End: 2023-08-24
Payer: MEDICAID

## 2023-08-24 ENCOUNTER — ANESTHESIA (OUTPATIENT)
Dept: SURGERY | Facility: HOSPITAL | Age: 58
End: 2023-08-24
Payer: MEDICAID

## 2023-08-24 ENCOUNTER — HOSPITAL ENCOUNTER (OUTPATIENT)
Facility: HOSPITAL | Age: 58
Discharge: HOME | End: 2023-08-24
Attending: SURGERY | Admitting: SURGERY
Payer: MEDICAID

## 2023-08-24 VITALS
OXYGEN SATURATION: 93 % | DIASTOLIC BLOOD PRESSURE: 93 MMHG | HEART RATE: 72 BPM | RESPIRATION RATE: 18 BRPM | HEIGHT: 73 IN | WEIGHT: 155 LBS | TEMPERATURE: 98 F | SYSTOLIC BLOOD PRESSURE: 161 MMHG | BODY MASS INDEX: 20.54 KG/M2

## 2023-08-24 DIAGNOSIS — K52.9 ACUTE GASTROENTERITIS: Primary | ICD-10-CM

## 2023-08-24 DIAGNOSIS — Z01.810 PRE-OPERATIVE CARDIOVASCULAR EXAMINATION, HIGH RISK SURGERY: ICD-10-CM

## 2023-08-24 DIAGNOSIS — R11.10 VOMITING: ICD-10-CM

## 2023-08-24 LAB
ALBUMIN SERPL-MCNC: 4.1 G/DL (ref 3.5–5)
ALBUMIN/GLOB SERPL: 1.2 RATIO (ref 1.1–2)
ALP SERPL-CCNC: 94 UNIT/L (ref 40–150)
ALT SERPL-CCNC: 71 UNIT/L (ref 0–55)
AST SERPL-CCNC: 35 UNIT/L (ref 5–34)
BASOPHILS # BLD AUTO: 0.03 X10(3)/MCL
BASOPHILS NFR BLD AUTO: 0.5 %
BILIRUB SERPL-MCNC: 0.6 MG/DL
BUN SERPL-MCNC: 16 MG/DL (ref 8.4–25.7)
CALCIUM SERPL-MCNC: 9.9 MG/DL (ref 8.4–10.2)
CHLORIDE SERPL-SCNC: 104 MMOL/L (ref 98–107)
CO2 SERPL-SCNC: 24 MMOL/L (ref 22–29)
CREAT SERPL-MCNC: 1.08 MG/DL (ref 0.73–1.18)
EOSINOPHIL # BLD AUTO: 0.08 X10(3)/MCL (ref 0–0.9)
EOSINOPHIL NFR BLD AUTO: 1.4 %
ERYTHROCYTE [DISTWIDTH] IN BLOOD BY AUTOMATED COUNT: 13.1 % (ref 11.5–17)
GFR SERPLBLD CREATININE-BSD FMLA CKD-EPI: >60 MLS/MIN/1.73/M2
GLOBULIN SER-MCNC: 3.4 GM/DL (ref 2.4–3.5)
GLUCOSE SERPL-MCNC: 110 MG/DL (ref 74–100)
HCT VFR BLD AUTO: 43.4 % (ref 42–52)
HGB BLD-MCNC: 14.6 G/DL (ref 14–18)
IMM GRANULOCYTES # BLD AUTO: 0.01 X10(3)/MCL (ref 0–0.04)
IMM GRANULOCYTES NFR BLD AUTO: 0.2 %
INR PPP: 1.1
LYMPHOCYTES # BLD AUTO: 1.34 X10(3)/MCL (ref 0.6–4.6)
LYMPHOCYTES NFR BLD AUTO: 22.9 %
MCH RBC QN AUTO: 27.9 PG (ref 27–31)
MCHC RBC AUTO-ENTMCNC: 33.6 G/DL (ref 33–36)
MCV RBC AUTO: 82.8 FL (ref 80–94)
MONOCYTES # BLD AUTO: 0.54 X10(3)/MCL (ref 0.1–1.3)
MONOCYTES NFR BLD AUTO: 9.2 %
NEUTROPHILS # BLD AUTO: 3.86 X10(3)/MCL (ref 2.1–9.2)
NEUTROPHILS NFR BLD AUTO: 65.8 %
PLATELET # BLD AUTO: 210 X10(3)/MCL (ref 130–400)
PMV BLD AUTO: 8.8 FL (ref 7.4–10.4)
POTASSIUM SERPL-SCNC: 4.2 MMOL/L (ref 3.5–5.1)
PROT SERPL-MCNC: 7.5 GM/DL (ref 6.4–8.3)
PROTHROMBIN TIME: 14.2 SECONDS (ref 12.5–14.5)
RBC # BLD AUTO: 5.24 X10(6)/MCL (ref 4.7–6.1)
SODIUM SERPL-SCNC: 136 MMOL/L (ref 136–145)
WBC # SPEC AUTO: 5.86 X10(3)/MCL (ref 4.5–11.5)

## 2023-08-24 PROCEDURE — 93010 ELECTROCARDIOGRAM REPORT: CPT | Mod: ,,, | Performed by: INTERNAL MEDICINE

## 2023-08-24 PROCEDURE — G0378 HOSPITAL OBSERVATION PER HR: HCPCS

## 2023-08-24 PROCEDURE — D9220A PRA ANESTHESIA: Mod: ,,, | Performed by: NURSE ANESTHETIST, CERTIFIED REGISTERED

## 2023-08-24 PROCEDURE — 25000003 PHARM REV CODE 250: Performed by: NURSE ANESTHETIST, CERTIFIED REGISTERED

## 2023-08-24 PROCEDURE — 93005 ELECTROCARDIOGRAM TRACING: CPT

## 2023-08-24 PROCEDURE — G0379 DIRECT REFER HOSPITAL OBSERV: HCPCS | Mod: 25

## 2023-08-24 PROCEDURE — 85610 PROTHROMBIN TIME: CPT | Performed by: SURGERY

## 2023-08-24 PROCEDURE — 37000008 HC ANESTHESIA 1ST 15 MINUTES: Performed by: SURGERY

## 2023-08-24 PROCEDURE — 63600175 PHARM REV CODE 636 W HCPCS: Performed by: NURSE ANESTHETIST, CERTIFIED REGISTERED

## 2023-08-24 PROCEDURE — 45378 DIAGNOSTIC COLONOSCOPY: CPT | Performed by: SURGERY

## 2023-08-24 PROCEDURE — 37000009 HC ANESTHESIA EA ADD 15 MINS: Performed by: SURGERY

## 2023-08-24 PROCEDURE — 85025 COMPLETE CBC W/AUTO DIFF WBC: CPT | Performed by: SURGERY

## 2023-08-24 PROCEDURE — D9220A PRA ANESTHESIA: ICD-10-PCS | Mod: ,,, | Performed by: NURSE ANESTHETIST, CERTIFIED REGISTERED

## 2023-08-24 PROCEDURE — 93010 EKG 12-LEAD: ICD-10-PCS | Mod: ,,, | Performed by: INTERNAL MEDICINE

## 2023-08-24 PROCEDURE — 80053 COMPREHEN METABOLIC PANEL: CPT | Performed by: SURGERY

## 2023-08-24 RX ORDER — LIDOCAINE HYDROCHLORIDE 10 MG/ML
1 INJECTION, SOLUTION EPIDURAL; INFILTRATION; INTRACAUDAL; PERINEURAL ONCE
Status: CANCELLED | OUTPATIENT
Start: 2023-08-24 | End: 2023-08-24

## 2023-08-24 RX ORDER — LIDOCAINE HYDROCHLORIDE 20 MG/ML
INJECTION, SOLUTION EPIDURAL; INFILTRATION; INTRACAUDAL; PERINEURAL
Status: DISCONTINUED | OUTPATIENT
Start: 2023-08-24 | End: 2023-08-24

## 2023-08-24 RX ORDER — PROPOFOL 10 MG/ML
VIAL (ML) INTRAVENOUS
Status: DISCONTINUED | OUTPATIENT
Start: 2023-08-24 | End: 2023-08-24

## 2023-08-24 RX ORDER — SODIUM CHLORIDE 9 MG/ML
INJECTION, SOLUTION INTRAVENOUS CONTINUOUS
Status: CANCELLED | OUTPATIENT
Start: 2023-08-24

## 2023-08-24 RX ORDER — SODIUM CHLORIDE, SODIUM LACTATE, POTASSIUM CHLORIDE, CALCIUM CHLORIDE 600; 310; 30; 20 MG/100ML; MG/100ML; MG/100ML; MG/100ML
INJECTION, SOLUTION INTRAVENOUS CONTINUOUS
Status: CANCELLED | OUTPATIENT
Start: 2023-08-24

## 2023-08-24 RX ORDER — FENTANYL CITRATE 50 UG/ML
INJECTION, SOLUTION INTRAMUSCULAR; INTRAVENOUS
Status: DISCONTINUED | OUTPATIENT
Start: 2023-08-24 | End: 2023-08-24

## 2023-08-24 RX ADMIN — PROPOFOL 100 MG: 10 INJECTION, EMULSION INTRAVENOUS at 11:08

## 2023-08-24 RX ADMIN — SODIUM CHLORIDE, POTASSIUM CHLORIDE, SODIUM LACTATE AND CALCIUM CHLORIDE: 600; 310; 30; 20 INJECTION, SOLUTION INTRAVENOUS at 10:08

## 2023-08-24 RX ADMIN — FENTANYL CITRATE 100 MCG: 50 INJECTION, SOLUTION INTRAMUSCULAR; INTRAVENOUS at 10:08

## 2023-08-24 RX ADMIN — PROPOFOL 150 MG: 10 INJECTION, EMULSION INTRAVENOUS at 11:08

## 2023-08-24 RX ADMIN — LIDOCAINE HYDROCHLORIDE 5 ML: 20 INJECTION, SOLUTION EPIDURAL; INFILTRATION; INTRACAUDAL; PERINEURAL at 10:08

## 2023-08-24 RX ADMIN — PROPOFOL 100 MG: 10 INJECTION, EMULSION INTRAVENOUS at 10:08

## 2023-08-24 NOTE — DISCHARGE SUMMARY
[facility]  Discharge Note  Short Stay    Procedure(s) (LRB):  COLONOSCOPY (N/A)      OUTCOME: Patient tolerated treatment/procedure well without complication and is now ready for discharge.    DISPOSITION: Home or Self Care    FINAL DIAGNOSIS:  Vomiting normal colonoscopy with diverticular disease of sigmoid grade 1-2 hemorrhoids    FOLLOWUP: In clinic 1 week    DISCHARGE INSTRUCTIONS:    Discharge Procedure Orders   Diet general        TIME SPENT ON DISCHARGE:  5 minutes

## 2023-08-24 NOTE — ANESTHESIA PREPROCEDURE EVALUATION
08/24/2023  Sarabjit Ontiveros is a 57 y.o., male.      Pre-op Assessment    I have reviewed the Patient Summary Reports.       I have reviewed the Medications.     Review of Systems  Anesthesia Hx:  No problems with previous Anesthesia  Neg history of prior surgery. Denies Family Hx of Anesthesia complications.   Denies Personal Hx of Anesthesia complications.   Social:  Non-Smoker    Hematology/Oncology:  Hematology Normal   Oncology Normal     EENT/Dental:EENT/Dental Normal   Cardiovascular:   Hypertension    Pulmonary:  Pulmonary Normal    Renal/:  Renal/ Normal     Hepatic/GI:  Hepatic/GI Normal    Musculoskeletal:  Musculoskeletal Normal    Neurological:   CVA Headaches    Endocrine:  Endocrine Normal    Psych:   Psychiatric History depression          Physical Exam  General: Cooperative and Alert    Airway:  Mallampati: II   Mouth Opening: Normal  TM Distance: Normal  Tongue: Normal  Neck ROM: Normal ROM    Dental:  Intact        Anesthesia Plan  Type of Anesthesia, risks & benefits discussed:    Anesthesia Type: Gen Natural Airway  Intra-op Monitoring Plan: Standard ASA Monitors  Post Op Pain Control Plan: multimodal analgesia  Induction:  IV  Informed Consent: Informed consent signed with the Patient and all parties understand the risks and agree with anesthesia plan.  All questions answered. Patient consented to blood products? Yes  ASA Score: 2  Day of Surgery Review of History & Physical: I have interviewed and examined the patient. I have reviewed the patient's H&P dated: There are no significant changes.     Ready For Surgery From Anesthesia Perspective.     .

## 2023-08-24 NOTE — OP NOTE
[facility]  Operative Note      Date of Procedure: 8/24/2023     Procedure: Procedure(s) (LRB):  COLONOSCOPY (N/A)     Surgeon(s) and Role:     * Nadir Gregg MD - Primary    Assisting Surgeon: None    Pre-Operative Diagnosis: Vomiting, unspecified vomiting type, unspecified whether nausea present [R11.10]  Blood in the stool  Post-Operative Diagnosis: Post-Op Diagnosis Codes:     * Vomiting, unspecified vomiting type, unspecified whether nausea present [R11.10]  1. Normal terminal ileum  2. Normal cecum ascending transverse and descending colon   3. Mild diverticular disease of the sigmoid colon  4. Grade 2 internal hemorrhoids with good tone no masses    Anesthesia: General    Operative Findings (including complications, if any):  Patient is a 57-year-old  male with hypertension osteoarthritis narcotic addiction over 10 years ago had periumbilical abdominal pain consistent with a umbilical hernia.  Patient also had blood in his stool noted on stool studies on 05/19/2023.  Patient ultrasound of the abdomen was negative on 02/01/2023 and CT scan of the abdomen and pelvis showed evidence of a small left inguinal hernia with evidence of an umbilical hernia on 05/19/2023.  Patient had need for a colonoscopy prior to hernia repair.    Patient was brought to the GI suite underwent sedation examination of the colon all the way to the cecum with intubation of ileocecal valve   Terminal ileum was normal up to 5 cm   Cecum ascending colon transverse colon descending colon were normal   Diverticular disease (moderate ) of the rectosigmoid noted  Grade 1-2 internal hemorrhoids were good tone no masses were noted     Plan   Robotic umbilical hernia repair new   Follow up in 2 years recheck stools for blood   Follow up in 10 years repeat screening colonoscopy   Consider EGD if hemoglobin hematocrit not stable             Description of Technical Procedures:  Noted above        Significant Surgical Tasks Conducted by the Assistant(s), if Applicable:  None       Estimated Blood Loss (EBL): * No values recorded between 8/24/2023 10:55 AM and 8/24/2023 11:16 AM *           Implants: * No implants in log *    Specimens:   Specimen (24h ago, onward)      None                    Condition: Good    Disposition: PACU - hemodynamically stable.    Attestation: I was present and scrubbed for the entire procedure.    Discharge Note    OUTCOME: Patient tolerated treatment/procedure well without complication and is now ready for discharge.        DISPOSITION: Home or Self Care    FINAL DIAGNOSIS:  Vomiting  Normal colonoscopy    FOLLOWUP: In clinic one-week    DISCHARGE INSTRUCTIONS:    Discharge Procedure Orders   Diet general

## 2023-08-25 NOTE — INTERVAL H&P NOTE
The patient has been examined and the H&P has been reviewed:    I concur with the findings and no changes have occurred since H&P was written.    Surgery risks, benefits and alternative options discussed and understood by patient/family.          Active Hospital Problems    Diagnosis  POA    *Vomiting [R11.10]  Yes      Resolved Hospital Problems   No resolved problems to display.

## 2023-08-26 ENCOUNTER — ANESTHESIA EVENT (OUTPATIENT)
Dept: SURGERY | Facility: HOSPITAL | Age: 58
End: 2023-08-26
Payer: MEDICAID

## 2023-08-26 NOTE — ANESTHESIA PREPROCEDURE EVALUATION
08/26/2023  Sarabjit Ontiveros is a 57 y.o., male.      Pre-op Assessment    I have reviewed the Patient Summary Reports.     I have reviewed the Nursing Notes. I have reviewed the NPO Status.   I have reviewed the Medications.     Review of Systems  Anesthesia Hx:  Denies Family Hx of Anesthesia complications.   Denies Personal Hx of Anesthesia complications.   Social:  Former Smoker, Alcohol Use    Hematology/Oncology:  Hematology Normal   Oncology Normal     EENT/Dental:EENT/Dental Normal   Cardiovascular:   Hypertension ECG has been reviewed.    Pulmonary:  Pulmonary Normal    Renal/:  Renal/ Normal     Hepatic/GI:  Hepatic/GI Normal    Musculoskeletal:  Musculoskeletal Normal    Neurological:   CVA Headaches    Endocrine:  Endocrine Normal    Dermatological:  Skin Normal    Psych:   Psychiatric History          Physical Exam  General: Cooperative, Alert and Oriented    Airway:  Mallampati: II   Mouth Opening: Normal  TM Distance: Normal  Tongue: Normal  Neck ROM: Normal ROM    Dental:  Intact        Anesthesia Plan  Type of Anesthesia, risks & benefits discussed:    Anesthesia Type: Gen ETT  Intra-op Monitoring Plan: Standard ASA Monitors  Post Op Pain Control Plan: multimodal analgesia  Induction:  IV  Airway Plan: Direct  Informed Consent: Informed consent signed with the Patient and all parties understand the risks and agree with anesthesia plan.  All questions answered. Patient consented to blood products? Yes  ASA Score: 3    Ready For Surgery From Anesthesia Perspective.     .

## 2023-08-28 ENCOUNTER — ANESTHESIA (OUTPATIENT)
Dept: SURGERY | Facility: HOSPITAL | Age: 58
End: 2023-08-28
Payer: MEDICAID

## 2023-08-28 ENCOUNTER — HOSPITAL ENCOUNTER (OUTPATIENT)
Facility: HOSPITAL | Age: 58
Discharge: HOME | End: 2023-08-28
Attending: SURGERY | Admitting: SURGERY
Payer: MEDICAID

## 2023-08-28 VITALS
RESPIRATION RATE: 18 BRPM | WEIGHT: 218 LBS | HEART RATE: 68 BPM | DIASTOLIC BLOOD PRESSURE: 79 MMHG | SYSTOLIC BLOOD PRESSURE: 146 MMHG | BODY MASS INDEX: 28.76 KG/M2 | TEMPERATURE: 98 F | OXYGEN SATURATION: 94 %

## 2023-08-28 DIAGNOSIS — K42.9 UMBILICAL HERNIA WITHOUT OBSTRUCTION AND WITHOUT GANGRENE: Primary | ICD-10-CM

## 2023-08-28 DIAGNOSIS — K42.9 UMBILICAL HERNIA: ICD-10-CM

## 2023-08-28 PROCEDURE — 25000003 PHARM REV CODE 250: Performed by: NURSE ANESTHETIST, CERTIFIED REGISTERED

## 2023-08-28 PROCEDURE — 71000015 HC POSTOP RECOV 1ST HR: Performed by: SURGERY

## 2023-08-28 PROCEDURE — 36000711: Performed by: SURGERY

## 2023-08-28 PROCEDURE — 63600175 PHARM REV CODE 636 W HCPCS: Performed by: SURGERY

## 2023-08-28 PROCEDURE — 36000710: Performed by: SURGERY

## 2023-08-28 PROCEDURE — 27201423 OPTIME MED/SURG SUP & DEVICES STERILE SUPPLY: Performed by: SURGERY

## 2023-08-28 PROCEDURE — 71000016 HC POSTOP RECOV ADDL HR: Performed by: SURGERY

## 2023-08-28 PROCEDURE — 37000008 HC ANESTHESIA 1ST 15 MINUTES: Performed by: SURGERY

## 2023-08-28 PROCEDURE — 37000009 HC ANESTHESIA EA ADD 15 MINS: Performed by: SURGERY

## 2023-08-28 PROCEDURE — D9220A PRA ANESTHESIA: Mod: ,,, | Performed by: NURSE ANESTHETIST, CERTIFIED REGISTERED

## 2023-08-28 PROCEDURE — 25000003 PHARM REV CODE 250: Performed by: SURGERY

## 2023-08-28 PROCEDURE — 63600175 PHARM REV CODE 636 W HCPCS: Performed by: ANESTHESIOLOGY

## 2023-08-28 PROCEDURE — 71000033 HC RECOVERY, INTIAL HOUR: Performed by: SURGERY

## 2023-08-28 PROCEDURE — 63600175 PHARM REV CODE 636 W HCPCS: Performed by: NURSE ANESTHETIST, CERTIFIED REGISTERED

## 2023-08-28 PROCEDURE — C1781 MESH (IMPLANTABLE): HCPCS | Performed by: SURGERY

## 2023-08-28 PROCEDURE — D9220A PRA ANESTHESIA: ICD-10-PCS | Mod: ,,, | Performed by: NURSE ANESTHETIST, CERTIFIED REGISTERED

## 2023-08-28 PROCEDURE — 25000003 PHARM REV CODE 250: Performed by: ANESTHESIOLOGY

## 2023-08-28 DEVICE — LAPAROSCOPIC SELF-FIXATING MESH POLYESTER WITH POLYLACTIC ACID GRIPS AND COLLAGEN FILM
Type: IMPLANTABLE DEVICE | Site: UMBILICAL | Status: FUNCTIONAL
Brand: PROGRIP

## 2023-08-28 RX ORDER — SODIUM CHLORIDE, SODIUM LACTATE, POTASSIUM CHLORIDE, CALCIUM CHLORIDE 600; 310; 30; 20 MG/100ML; MG/100ML; MG/100ML; MG/100ML
INJECTION, SOLUTION INTRAVENOUS CONTINUOUS
Status: DISCONTINUED | OUTPATIENT
Start: 2023-08-28 | End: 2023-08-28 | Stop reason: HOSPADM

## 2023-08-28 RX ORDER — ACETAMINOPHEN 500 MG
1000 TABLET ORAL
Status: COMPLETED | OUTPATIENT
Start: 2023-08-28 | End: 2023-08-28

## 2023-08-28 RX ORDER — PROPOFOL 10 MG/ML
VIAL (ML) INTRAVENOUS
Status: DISCONTINUED | OUTPATIENT
Start: 2023-08-28 | End: 2023-08-28

## 2023-08-28 RX ORDER — KETOROLAC TROMETHAMINE 30 MG/ML
15 INJECTION, SOLUTION INTRAMUSCULAR; INTRAVENOUS EVERY 6 HOURS PRN
Status: DISCONTINUED | OUTPATIENT
Start: 2023-08-28 | End: 2023-08-28 | Stop reason: HOSPADM

## 2023-08-28 RX ORDER — EPHEDRINE SULFATE 50 MG/ML
INJECTION, SOLUTION INTRAVENOUS
Status: DISCONTINUED | OUTPATIENT
Start: 2023-08-28 | End: 2023-08-28

## 2023-08-28 RX ORDER — ROCURONIUM BROMIDE 10 MG/ML
INJECTION, SOLUTION INTRAVENOUS
Status: DISCONTINUED | OUTPATIENT
Start: 2023-08-28 | End: 2023-08-28

## 2023-08-28 RX ORDER — GABAPENTIN 300 MG/1
600 CAPSULE ORAL
Status: COMPLETED | OUTPATIENT
Start: 2023-08-28 | End: 2023-08-28

## 2023-08-28 RX ORDER — LIDOCAINE HYDROCHLORIDE 20 MG/ML
INJECTION, SOLUTION EPIDURAL; INFILTRATION; INTRACAUDAL; PERINEURAL
Status: DISCONTINUED | OUTPATIENT
Start: 2023-08-28 | End: 2023-08-28

## 2023-08-28 RX ORDER — TRAMADOL HYDROCHLORIDE 50 MG/1
50 TABLET ORAL
Status: COMPLETED | OUTPATIENT
Start: 2023-08-28 | End: 2023-08-28

## 2023-08-28 RX ORDER — CEFAZOLIN SODIUM 2 G/50ML
2 SOLUTION INTRAVENOUS
Status: COMPLETED | OUTPATIENT
Start: 2023-08-28 | End: 2023-08-28

## 2023-08-28 RX ORDER — ONDANSETRON 2 MG/ML
4 INJECTION INTRAMUSCULAR; INTRAVENOUS EVERY 12 HOURS PRN
Status: CANCELLED | OUTPATIENT
Start: 2023-08-28

## 2023-08-28 RX ORDER — PROMETHAZINE HYDROCHLORIDE 12.5 MG/1
12.5 TABLET ORAL EVERY 6 HOURS PRN
Status: DISCONTINUED | OUTPATIENT
Start: 2023-08-28 | End: 2023-08-28 | Stop reason: HOSPADM

## 2023-08-28 RX ORDER — FENTANYL CITRATE 50 UG/ML
25 INJECTION, SOLUTION INTRAMUSCULAR; INTRAVENOUS EVERY 5 MIN PRN
Status: DISCONTINUED | OUTPATIENT
Start: 2023-08-28 | End: 2023-08-28 | Stop reason: HOSPADM

## 2023-08-28 RX ORDER — SODIUM CHLORIDE 0.9 % (FLUSH) 0.9 %
10 SYRINGE (ML) INJECTION
Status: DISCONTINUED | OUTPATIENT
Start: 2023-08-28 | End: 2023-08-28 | Stop reason: HOSPADM

## 2023-08-28 RX ORDER — HYDROMORPHONE HYDROCHLORIDE 2 MG/ML
INJECTION, SOLUTION INTRAMUSCULAR; INTRAVENOUS; SUBCUTANEOUS
Status: DISCONTINUED | OUTPATIENT
Start: 2023-08-28 | End: 2023-08-28

## 2023-08-28 RX ORDER — BUPIVACAINE HYDROCHLORIDE 2.5 MG/ML
INJECTION, SOLUTION EPIDURAL; INFILTRATION; INTRACAUDAL
Status: DISCONTINUED | OUTPATIENT
Start: 2023-08-28 | End: 2023-08-28 | Stop reason: HOSPADM

## 2023-08-28 RX ORDER — MIDAZOLAM HYDROCHLORIDE 1 MG/ML
INJECTION INTRAMUSCULAR; INTRAVENOUS
Status: DISCONTINUED | OUTPATIENT
Start: 2023-08-28 | End: 2023-08-28

## 2023-08-28 RX ORDER — HYDROMORPHONE HYDROCHLORIDE 2 MG/ML
0.2 INJECTION, SOLUTION INTRAMUSCULAR; INTRAVENOUS; SUBCUTANEOUS EVERY 5 MIN PRN
Status: DISCONTINUED | OUTPATIENT
Start: 2023-08-28 | End: 2023-08-28 | Stop reason: HOSPADM

## 2023-08-28 RX ORDER — HYDROCODONE BITARTRATE AND ACETAMINOPHEN 7.5; 325 MG/1; MG/1
1 TABLET ORAL EVERY 6 HOURS PRN
Status: DISCONTINUED | OUTPATIENT
Start: 2023-08-28 | End: 2023-08-28 | Stop reason: HOSPADM

## 2023-08-28 RX ORDER — FENTANYL CITRATE 50 UG/ML
INJECTION, SOLUTION INTRAMUSCULAR; INTRAVENOUS
Status: DISCONTINUED | OUTPATIENT
Start: 2023-08-28 | End: 2023-08-28

## 2023-08-28 RX ORDER — HYDROMORPHONE HYDROCHLORIDE 2 MG/ML
0.5 INJECTION, SOLUTION INTRAMUSCULAR; INTRAVENOUS; SUBCUTANEOUS EVERY 6 HOURS PRN
Status: CANCELLED | OUTPATIENT
Start: 2023-08-28

## 2023-08-28 RX ORDER — DIAZEPAM 5 MG/1
10 TABLET ORAL
Status: COMPLETED | OUTPATIENT
Start: 2023-08-28 | End: 2023-08-28

## 2023-08-28 RX ORDER — DEXAMETHASONE SODIUM PHOSPHATE 4 MG/ML
INJECTION, SOLUTION INTRA-ARTICULAR; INTRALESIONAL; INTRAMUSCULAR; INTRAVENOUS; SOFT TISSUE
Status: DISCONTINUED | OUTPATIENT
Start: 2023-08-28 | End: 2023-08-28

## 2023-08-28 RX ORDER — SODIUM CHLORIDE 9 MG/ML
INJECTION, SOLUTION INTRAVENOUS CONTINUOUS
Status: CANCELLED | OUTPATIENT
Start: 2023-08-28

## 2023-08-28 RX ORDER — ONDANSETRON 2 MG/ML
INJECTION INTRAMUSCULAR; INTRAVENOUS
Status: DISCONTINUED | OUTPATIENT
Start: 2023-08-28 | End: 2023-08-28

## 2023-08-28 RX ADMIN — SUGAMMADEX 200 MG: 100 INJECTION, SOLUTION INTRAVENOUS at 05:08

## 2023-08-28 RX ADMIN — PROPOFOL 150 MG: 10 INJECTION, EMULSION INTRAVENOUS at 03:08

## 2023-08-28 RX ADMIN — FENTANYL CITRATE 100 MCG: 50 INJECTION, SOLUTION INTRAMUSCULAR; INTRAVENOUS at 03:08

## 2023-08-28 RX ADMIN — MIDAZOLAM HYDROCHLORIDE 2 MG: 1 INJECTION, SOLUTION INTRAMUSCULAR; INTRAVENOUS at 02:08

## 2023-08-28 RX ADMIN — HYDROMORPHONE HYDROCHLORIDE 0.4 MG: 2 INJECTION INTRAMUSCULAR; INTRAVENOUS; SUBCUTANEOUS at 05:08

## 2023-08-28 RX ADMIN — TRAMADOL HYDROCHLORIDE 50 MG: 50 TABLET, FILM COATED ORAL at 10:08

## 2023-08-28 RX ADMIN — ACETAMINOPHEN 1000 MG: 500 TABLET, FILM COATED ORAL at 10:08

## 2023-08-28 RX ADMIN — ROCURONIUM BROMIDE 20 MG: 50 INJECTION INTRAVENOUS at 04:08

## 2023-08-28 RX ADMIN — GABAPENTIN 600 MG: 300 CAPSULE ORAL at 10:08

## 2023-08-28 RX ADMIN — CEFAZOLIN SODIUM 2 G: 2 SOLUTION INTRAVENOUS at 03:08

## 2023-08-28 RX ADMIN — DEXAMETHASONE SODIUM PHOSPHATE 8 MG: 4 INJECTION, SOLUTION INTRA-ARTICULAR; INTRALESIONAL; INTRAMUSCULAR; INTRAVENOUS; SOFT TISSUE at 03:08

## 2023-08-28 RX ADMIN — LIDOCAINE HYDROCHLORIDE 100 MG: 20 INJECTION, SOLUTION EPIDURAL; INFILTRATION; INTRACAUDAL; PERINEURAL at 03:08

## 2023-08-28 RX ADMIN — ONDANSETRON 4 MG: 2 INJECTION INTRAMUSCULAR; INTRAVENOUS at 03:08

## 2023-08-28 RX ADMIN — HYDROMORPHONE HYDROCHLORIDE 0.6 MG: 2 INJECTION INTRAMUSCULAR; INTRAVENOUS; SUBCUTANEOUS at 05:08

## 2023-08-28 RX ADMIN — HYDROCODONE BITARTRATE AND ACETAMINOPHEN 1 TABLET: 7.5; 325 TABLET ORAL at 06:08

## 2023-08-28 RX ADMIN — ROCURONIUM BROMIDE 50 MG: 50 INJECTION INTRAVENOUS at 03:08

## 2023-08-28 RX ADMIN — DIAZEPAM 10 MG: 5 TABLET ORAL at 10:08

## 2023-08-28 RX ADMIN — SODIUM CHLORIDE, POTASSIUM CHLORIDE, SODIUM LACTATE AND CALCIUM CHLORIDE: 600; 310; 30; 20 INJECTION, SOLUTION INTRAVENOUS at 03:08

## 2023-08-28 RX ADMIN — SODIUM CHLORIDE, POTASSIUM CHLORIDE, SODIUM LACTATE AND CALCIUM CHLORIDE: 600; 310; 30; 20 INJECTION, SOLUTION INTRAVENOUS at 09:08

## 2023-08-28 RX ADMIN — EPHEDRINE SULFATE 10 MG: 50 INJECTION INTRAVENOUS at 03:08

## 2023-08-28 NOTE — DISCHARGE SUMMARY
[facility]  Discharge Note  Short Stay    Procedure(s) (LRB):  XI ROBOTIC REPAIR, HERNIA, UMBILICAL (N/A)      OUTCOME: Patient tolerated treatment/procedure well without complication and is now ready for discharge.    DISPOSITION: Home or Self Care    FINAL DIAGNOSIS:  Umbilical hernia without obstruction and without gangrene primary closure with 0 Prolene and coverage with 10 x 15 ProGrip mesh    FOLLOWUP: In clinic 1 week    DISCHARGE INSTRUCTIONS:    Discharge Procedure Orders   Diet general         Clinical Reference Documents Added to Patient Instructions         Document    ABDOMINAL HERNIA REPAIR DISCHARGE INSTRUCTIONS (ENGLISH)    GENERAL ANESTHESIA DISCHARGE INSTRUCTIONS (ENGLISH)            TIME SPENT ON DISCHARGE:  5 minutes

## 2023-08-28 NOTE — OP NOTE
[facility] - Periop Services  Operative Note      Date of Procedure: 8/28/2023     Procedure: Procedure(s) (LRB):  XI ROBOTIC REPAIR, HERNIA, UMBILICAL (N/A)     Surgeon(s) and Role:     * Nadir Gregg MD - Primary    Assisting Surgeon: None    Pre-Operative Diagnosis: Umbilical hernia without obstruction and without gangrene [K42.9]    Post-Operative Diagnosis: Post-Op Diagnosis Codes:     * Umbilical hernia without obstruction and without gangrene [K42.9]    Anesthesia: General    Operative Findings (including complications, if any):  Patient is a 57-year-old with an umbilical hernia that required repair.    Patient was brought to the OR supine position general anesthetic prepped and draped in a sterile fashion had a left upper quadrant incision made with insertion of the Veress needle insufflation abdomen of 14 mm of mercury with insertion of a 5 mm trocar.  Patient then underwent insertion of 3 robotic 8 mm trocars along the left lateral abdominal wall.  The patient was rotated to the 8 degree position right side down left side up.  Patient in addition to this had the left upper quadrant incision site trocar placed under direct vision.  Patient underwent grasping and mobilization of the preperitoneal space and dissection with mobilization of the peritoneal pocket.  The hernia defect was reduced.  The hernia was about 2 cm in size.  There was closed with 0 Prolene V lock stitch.  Once the fascia was closed with 0 Prolene V lock stitch patient then underwent coverage with a ProGrip mesh 10 x 15 cm in size.  It was sutured in with absorbable 2-0 V lock suture.  The adhesive side of the mesh was toward the posterior rectus fascia the nonadhesive side was toward the peritoneum.  The peritoneum was used to cover the mesh.  The peritoneum was reapproximated with 2-0 running V lock Vicryl.  Sterile dressings were applied no problems were encountered the trocars removed the aponeuroses of the 8 and 5  mm trocar sites were closed with 0 Vicryl on  needle subQ was closed with 4-0 plain gut suture Dermabond was used for skin.        Description of Technical Procedures:  Noted above       Significant Surgical Tasks Conducted by the Assistant(s), if Applicable:  None       Estimated Blood Loss (EBL): * No values recorded between 8/28/2023  4:11 PM and 8/28/2023  5:17 PM *           Implants:   Implant Name Type Inv. Item Serial No.  Lot No. LRB No. Used Action   MESH LAP PG 10X15 DF FLATSHEET - [id number] Mesh MESH LAP PG 10X15 DF FLATSHEET  COVIDIEN [id number] N/A 1 Implanted       Specimens:   Specimen (24h ago, onward)      None                    Condition: Good    Disposition: PACU - hemodynamically stable.    Attestation: I was present and scrubbed for the entire procedure.    Discharge Note    OUTCOME: Patient tolerated treatment/procedure well without complication and is now ready for discharge.      DISPOSITION: Home or Self Care    FINAL DIAGNOSIS:  Umbilical hernia without obstruction and without gangrene robotically repaired with primary closure and then covered with 10 x 15 ProGrip mesh in a preperitoneal space    FOLLOWUP: In clinic 1 week    DISCHARGE INSTRUCTIONS:    Discharge Procedure Orders   Diet general        Clinical Reference Documents Added to Patient Instructions         Document    ABDOMINAL HERNIA REPAIR DISCHARGE INSTRUCTIONS (ENGLISH)    GENERAL ANESTHESIA DISCHARGE INSTRUCTIONS (ENGLISH)

## 2023-08-28 NOTE — DISCHARGE INSTRUCTIONS
DR. HILL POST OP INSTRUCTIONS                                                                                    STARTING TOMORROW, SHOWER NO BATHS. CLEAN INCISIONS DAILY                                                                            WITH SOAP AND WATER. KEEP CLEAN AND DRY. NO HEAVY LIFTING OR DRIVING                                                                            UNTIL RELEASED BY DR HILL. NOTIFY YOUR DOCTOR WITH ANY FEVER                                                                            GREATER THAN 101, REDNESS OR DRAINAGE AT INCISION SITE, EXCESSIVE PAIN,                                                                                                     OR  EXCESSIVE BLEEDING .

## 2023-08-28 NOTE — ANESTHESIA POSTPROCEDURE EVALUATION
Anesthesia Post Evaluation    Patient: Sarabjit Ontiveros    Procedure(s) Performed: Procedure(s) (LRB):  XI ROBOTIC REPAIR, HERNIA, UMBILICAL (N/A)    Final Anesthesia Type: general      Patient location during evaluation: PACU  Patient participation: Yes- Able to Participate  Level of consciousness: awake and alert  Post-procedure vital signs: reviewed and stable  Pain management: adequate  Airway patency: patent  KATERINE mitigation strategies: Multimodal analgesia and Verification of full reversal of neuromuscular block  PONV status at discharge: No PONV  Anesthetic complications: no      Cardiovascular status: blood pressure returned to baseline  Respiratory status: unassisted  Hydration status: euvolemic  Follow-up not needed.          Vitals Value Taken Time   /79 08/28/23 1735   Temp 36.3 °C (97.3 °F) 08/28/23 1720   Pulse 65 08/28/23 1735   Resp 15 08/28/23 1735   SpO2 89 % 08/28/23 1735   Vitals shown include unvalidated device data.      Event Time   Out of Recovery 17:37:42         Pain/Corrie Score: Pain Rating Prior to Med Admin: 0 (8/28/2023 10:04 AM)  Corrie Score: 8 (8/28/2023  5:30 PM)

## 2023-11-02 ENCOUNTER — HOSPITAL ENCOUNTER (EMERGENCY)
Facility: HOSPITAL | Age: 58
Discharge: HOME | End: 2023-11-02
Attending: FAMILY MEDICINE
Payer: MEDICAID

## 2023-11-02 VITALS
RESPIRATION RATE: 20 BRPM | TEMPERATURE: 97 F | BODY MASS INDEX: 29.16 KG/M2 | SYSTOLIC BLOOD PRESSURE: 141 MMHG | WEIGHT: 220 LBS | HEIGHT: 73 IN | DIASTOLIC BLOOD PRESSURE: 79 MMHG | OXYGEN SATURATION: 98 % | HEART RATE: 68 BPM

## 2023-11-02 DIAGNOSIS — G44.209 TENSION HEADACHE: Primary | ICD-10-CM

## 2023-11-02 LAB
ALBUMIN SERPL-MCNC: 4.5 G/DL (ref 3.4–4.8)
ALBUMIN/GLOB SERPL: 1.3 RATIO (ref 1.1–2)
ALP SERPL-CCNC: 134 UNIT/L (ref 40–150)
ALT SERPL-CCNC: 79 UNIT/L (ref 0–55)
AST SERPL-CCNC: 48 UNIT/L (ref 5–34)
BASOPHILS # BLD AUTO: 0.03 X10(3)/MCL
BASOPHILS NFR BLD AUTO: 0.8 %
BILIRUB SERPL-MCNC: 0.4 MG/DL
BUN SERPL-MCNC: 33.6 MG/DL (ref 8.4–25.7)
CALCIUM SERPL-MCNC: 9.9 MG/DL (ref 8.8–10)
CHLORIDE SERPL-SCNC: 100 MMOL/L (ref 98–107)
CO2 SERPL-SCNC: 26 MMOL/L (ref 23–31)
CREAT SERPL-MCNC: 1.59 MG/DL (ref 0.73–1.18)
EOSINOPHIL # BLD AUTO: 0.14 X10(3)/MCL (ref 0–0.9)
EOSINOPHIL NFR BLD AUTO: 3.8 %
ERYTHROCYTE [DISTWIDTH] IN BLOOD BY AUTOMATED COUNT: 12.7 % (ref 11.5–17)
GFR SERPLBLD CREATININE-BSD FMLA CKD-EPI: 49 MLS/MIN/1.73/M2
GLOBULIN SER-MCNC: 3.6 GM/DL (ref 2.4–3.5)
GLUCOSE SERPL-MCNC: 123 MG/DL (ref 82–115)
HCT VFR BLD AUTO: 45.3 % (ref 42–52)
HGB BLD-MCNC: 14.9 G/DL (ref 14–18)
IMM GRANULOCYTES # BLD AUTO: 0.01 X10(3)/MCL (ref 0–0.04)
IMM GRANULOCYTES NFR BLD AUTO: 0.3 %
LYMPHOCYTES # BLD AUTO: 1.16 X10(3)/MCL (ref 0.6–4.6)
LYMPHOCYTES NFR BLD AUTO: 31.3 %
MCH RBC QN AUTO: 27.9 PG (ref 27–31)
MCHC RBC AUTO-ENTMCNC: 32.9 G/DL (ref 33–36)
MCV RBC AUTO: 84.8 FL (ref 80–94)
MONOCYTES # BLD AUTO: 0.44 X10(3)/MCL (ref 0.1–1.3)
MONOCYTES NFR BLD AUTO: 11.9 %
NEUTROPHILS # BLD AUTO: 1.93 X10(3)/MCL (ref 2.1–9.2)
NEUTROPHILS NFR BLD AUTO: 51.9 %
PLATELET # BLD AUTO: 201 X10(3)/MCL (ref 130–400)
PMV BLD AUTO: 9.2 FL (ref 7.4–10.4)
POTASSIUM SERPL-SCNC: 4 MMOL/L (ref 3.5–5.1)
PROT SERPL-MCNC: 8.1 GM/DL (ref 5.8–7.6)
RBC # BLD AUTO: 5.34 X10(6)/MCL (ref 4.7–6.1)
SODIUM SERPL-SCNC: 136 MMOL/L (ref 136–145)
WBC # SPEC AUTO: 3.71 X10(3)/MCL (ref 4.5–11.5)

## 2023-11-02 PROCEDURE — 96361 HYDRATE IV INFUSION ADD-ON: CPT

## 2023-11-02 PROCEDURE — 25000003 PHARM REV CODE 250

## 2023-11-02 PROCEDURE — 99285 EMERGENCY DEPT VISIT HI MDM: CPT | Mod: 25

## 2023-11-02 PROCEDURE — 80053 COMPREHEN METABOLIC PANEL: CPT

## 2023-11-02 PROCEDURE — 96375 TX/PRO/DX INJ NEW DRUG ADDON: CPT

## 2023-11-02 PROCEDURE — 96374 THER/PROPH/DIAG INJ IV PUSH: CPT

## 2023-11-02 PROCEDURE — 85025 COMPLETE CBC W/AUTO DIFF WBC: CPT

## 2023-11-02 PROCEDURE — 63600175 PHARM REV CODE 636 W HCPCS

## 2023-11-02 RX ORDER — KETOROLAC TROMETHAMINE 30 MG/ML
30 INJECTION, SOLUTION INTRAMUSCULAR; INTRAVENOUS
Status: COMPLETED | OUTPATIENT
Start: 2023-11-02 | End: 2023-11-02

## 2023-11-02 RX ORDER — BUTALBITAL, ACETAMINOPHEN AND CAFFEINE 50; 325; 40 MG/1; MG/1; MG/1
1 TABLET ORAL
Status: COMPLETED | OUTPATIENT
Start: 2023-11-02 | End: 2023-11-02

## 2023-11-02 RX ORDER — DIPHENHYDRAMINE HYDROCHLORIDE 50 MG/ML
25 INJECTION INTRAMUSCULAR; INTRAVENOUS ONCE
Status: COMPLETED | OUTPATIENT
Start: 2023-11-02 | End: 2023-11-02

## 2023-11-02 RX ORDER — METOCLOPRAMIDE HYDROCHLORIDE 5 MG/ML
10 INJECTION INTRAMUSCULAR; INTRAVENOUS
Status: COMPLETED | OUTPATIENT
Start: 2023-11-02 | End: 2023-11-02

## 2023-11-02 RX ADMIN — BUTALBITAL, ACETAMINOPHEN, AND CAFFEINE 1 TABLET: 325; 50; 40 TABLET ORAL at 02:11

## 2023-11-02 RX ADMIN — METOCLOPRAMIDE 10 MG: 5 INJECTION, SOLUTION INTRAMUSCULAR; INTRAVENOUS at 01:11

## 2023-11-02 RX ADMIN — DIPHENHYDRAMINE HYDROCHLORIDE 25 MG: 50 INJECTION INTRAMUSCULAR; INTRAVENOUS at 01:11

## 2023-11-02 RX ADMIN — KETOROLAC TROMETHAMINE 30 MG: 30 INJECTION, SOLUTION INTRAMUSCULAR; INTRAVENOUS at 01:11

## 2023-11-02 RX ADMIN — SODIUM CHLORIDE 1000 ML: 9 INJECTION, SOLUTION INTRAVENOUS at 01:11

## 2023-11-02 NOTE — DISCHARGE INSTRUCTIONS
Patient will be discharged home.  Instructed to maintain compliance with pressure medication.  Take Claritin D 10 mg over-the-counter once daily for 10 days.  Follow up with the primary care provider.  Return ER for any worsening symptoms

## 2023-11-02 NOTE — ED PROVIDER NOTES
Encounter Date: 11/2/2023       History     Chief Complaint   Patient presents with    Headache     Complains of migraine headache since last night with vomiting     Headache onset last pm.  Pt did not take anything for pain.      The history is provided by the patient. No  was used.     Review of patient's allergies indicates:  No Known Allergies  Past Medical History:   Diagnosis Date    Chronic pain disorder     Depression     Hypertension     Stroke      Past Surgical History:   Procedure Laterality Date    COLONOSCOPY N/A 8/24/2023    Procedure: COLONOSCOPY;  Surgeon: Nadir Gregg MD;  Location: [facility] ENDO;  Service: Endoscopy;  Laterality: N/A;    ROBOT-ASSISTED REPAIR OF UMBILICAL HERNIA USING DA NEIL XI N/A 8/28/2023    Procedure: XI ROBOTIC REPAIR, HERNIA, UMBILICAL;  Surgeon: Nadir Gregg MD;  Location: [facility] OR;  Service: General;  Laterality: N/A;  with mesh     Family History   Problem Relation Age of Onset    Coronary artery disease Mother     Coronary artery disease Father      Social History     Tobacco Use    Smoking status: Former     Types: Cigarettes    Smokeless tobacco: Former   Substance Use Topics    Alcohol use: Yes     Comment: Occasionally    Drug use: Not Currently     Review of Systems   Constitutional:  Positive for activity change.   HENT:  Positive for postnasal drip, sinus pressure and sinus pain.    Eyes:  Positive for photophobia.   Respiratory: Negative.     Cardiovascular: Negative.    Gastrointestinal: Negative.    Endocrine: Negative.    Genitourinary: Negative.    Musculoskeletal: Negative.    Skin: Negative.    Allergic/Immunologic: Negative.    Neurological:  Positive for dizziness and headaches.   Hematological: Negative.    Psychiatric/Behavioral: Negative.     All other systems reviewed and are negative.      Physical Exam     Initial Vitals [11/02/23 1258]   BP Pulse Resp Temp SpO2   (!) 180/101 68 20 97.3 °F (36.3 °C) 98 %      MAP        --         Physical Exam    Nursing note and vitals reviewed.  Constitutional: He appears well-developed and well-nourished.   HENT:   Head: Normocephalic and atraumatic.   Right Ear: External ear normal.   Left Ear: External ear normal.   Nose: Nose normal.   Mouth/Throat: Oropharynx is clear and moist.   Eyes: Conjunctivae and EOM are normal. Pupils are equal, round, and reactive to light.   Neck: Neck supple.   Normal range of motion.  Cardiovascular:  Normal rate, regular rhythm, normal heart sounds and intact distal pulses.           Pulmonary/Chest: Breath sounds normal.   Abdominal: Abdomen is soft. Bowel sounds are normal.   Musculoskeletal:         General: Normal range of motion.      Cervical back: Normal range of motion and neck supple.     Neurological: He is alert and oriented to person, place, and time. He has normal strength and normal reflexes. GCS score is 15. GCS eye subscore is 4. GCS verbal subscore is 5. GCS motor subscore is 6.   Skin: Skin is warm. Capillary refill takes less than 2 seconds.   Psychiatric: He has a normal mood and affect. His behavior is normal. Judgment and thought content normal.         ED Course   Procedures  Labs Reviewed   COMPREHENSIVE METABOLIC PANEL - Abnormal; Notable for the following components:       Result Value    Glucose Level 123 (*)     Blood Urea Nitrogen 33.6 (*)     Creatinine 1.59 (*)     Protein Total 8.1 (*)     Globulin 3.6 (*)     Alanine Aminotransferase 79 (*)     Aspartate Aminotransferase 48 (*)     All other components within normal limits   CBC WITH DIFFERENTIAL - Abnormal; Notable for the following components:    WBC 3.71 (*)     MCHC 32.9 (*)     Neut # 1.93 (*)     All other components within normal limits   CBC W/ AUTO DIFFERENTIAL    Narrative:     The following orders were created for panel order CBC auto differential.  Procedure                               Abnormality         Status                     ---------                                -----------         ------                     CBC with Differential[[id number]]        Abnormal            Final result                 Please view results for these tests on the individual orders.          Imaging Results              CT Head Without Contrast (Final result)  Result time 11/02/23 13:47:01      Final result by Naeem Whelan MD (11/02/23 13:47:01)                   Impression:      No acute intracranial process identified.      Electronically signed by: Naeem Whelan  Date:    11/02/2023  Time:    13:47               Narrative:    EXAMINATION:  CT HEAD WITHOUT CONTRAST    CLINICAL HISTORY:  Headache, new or worsening (Age >= 50y);    TECHNIQUE:  CT images of the head without IV contrast. Axial, coronal and sagittal images reviewed. Dose length product 1243 mGycm. Automatic exposure control, adjustment of mA/kV or iterative reconstruction technique used to limit radiation dose.    COMPARISON:  CT 10/03/2022    FINDINGS:  Extra-axial spaces/ventricular system: Normal for age.    Intracranial hemorrhage: None identified.    Cerebral parenchyma: No acute large vessel territory infarct or mass effect identified. 10 mm hyperdensity in the left parietal lobe stable since 2022, likely cavernoma.    Vascular system: No hyperdense vessel appreciated.    Cerebellum: Normal.    Sella: Normal.    Included paranasal sinuses and mastoid air cells: Well-aerated.    Visualized orbits: Normal.    Scalp/Calvarium: No depressed skull fracture.                                       Medications   sodium chloride 0.9% bolus 1,000 mL 1,000 mL (0 mLs Intravenous Stopped 11/2/23 1458)   ketorolac injection 30 mg (30 mg Intravenous Given 11/2/23 1357)   metoclopramide HCl injection 10 mg (10 mg Intravenous Given 11/2/23 1357)   diphenhydrAMINE injection 25 mg (25 mg Intravenous Given 11/2/23 1357)   butalbital-acetaminophen-caffeine -40 mg per tablet 1 tablet (1 tablet Oral Given 11/2/23 1439)     Medical  Decision Making  Awake alert oriented 58-year-old male presents emergency room with complaints of headache onset last p.m. patient reports he has a history migraines and has a small aneurysm.  Positive photophobia, positive nausea.  Head atraumatic.  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  Patient reports onset 2 years ago no change in size or texture.  Tenderness to the temporal lobes and frontal sinuses. PERRLA, EOMI.  Nares patent.  Posterior oropharynx without redness or exudate.  Mucous membranes moist.  Bilateral hand  equal.  Normal raising fall.  Bilateral leg lifts equal.  Moves all extremities well.  Normal DTRs no Romberg.  Vital signs stable with elevated blood pressure.  Reports compliance with medication.  GCS 15 cranial nerves 2-12 intact neuro focal exam normal.    Diff. Dx:  Migraine, Tension Headache,  Hypertension.     Pt reports slight improvement in headache after medications.  Fluids still infusing.      Amount and/or Complexity of Data Reviewed  Labs: ordered.     Details: WBC:  3.71  Radiology: ordered.     Details: Ct:  No acute intracranial process identified    Risk  Prescription drug management.                               Clinical Impression:   Final diagnoses:  [G44.209] Tension headache (Primary)        ED Disposition Condition    Discharge Stable          ED Prescriptions    None       Follow-up Information       Follow up With Specialties Details Why Contact Info    Arjun Bennett MD Family Medicine  As needed [address] [place] [address]  [phone]               Anni Lehman NP  11/02/23 1217

## 2024-01-01 ENCOUNTER — HOSPITAL ENCOUNTER (EMERGENCY)
Facility: HOSPITAL | Age: 59
Discharge: HOME | End: 2024-01-02
Attending: FAMILY MEDICINE
Payer: MEDICAID

## 2024-01-01 DIAGNOSIS — N30.90 CYSTITIS: Primary | ICD-10-CM

## 2024-01-01 LAB
ALBUMIN SERPL-MCNC: 4.3 G/DL (ref 3.4–4.8)
ALBUMIN/GLOB SERPL: 1.1 RATIO (ref 1.1–2)
ALP SERPL-CCNC: 113 UNIT/L (ref 40–150)
ALT SERPL-CCNC: 58 UNIT/L (ref 0–55)
APPEARANCE UR: CLEAR
AST SERPL-CCNC: 35 UNIT/L (ref 5–34)
BACTERIA #/AREA URNS AUTO: NORMAL /HPF
BASOPHILS # BLD AUTO: 0.02 X10(3)/MCL
BASOPHILS NFR BLD AUTO: 0.3 %
BILIRUB SERPL-MCNC: 0.4 MG/DL
BILIRUB UR QL STRIP.AUTO: NEGATIVE
BUN SERPL-MCNC: 25.6 MG/DL (ref 8.4–25.7)
CALCIUM SERPL-MCNC: 9.9 MG/DL (ref 8.8–10)
CHLORIDE SERPL-SCNC: 105 MMOL/L (ref 98–107)
CO2 SERPL-SCNC: 22 MMOL/L (ref 23–31)
COLOR UR AUTO: YELLOW
CREAT SERPL-MCNC: 1.76 MG/DL (ref 0.73–1.18)
EOSINOPHIL # BLD AUTO: 0.09 X10(3)/MCL (ref 0–0.9)
EOSINOPHIL NFR BLD AUTO: 1.5 %
ERYTHROCYTE [DISTWIDTH] IN BLOOD BY AUTOMATED COUNT: 12 % (ref 11.5–17)
GFR SERPLBLD CREATININE-BSD FMLA CKD-EPI: 44 MLS/MIN/1.73/M2
GLOBULIN SER-MCNC: 3.8 GM/DL (ref 2.4–3.5)
GLUCOSE SERPL-MCNC: 143 MG/DL (ref 82–115)
GLUCOSE UR QL STRIP.AUTO: NEGATIVE
HCT VFR BLD AUTO: 47.4 % (ref 42–52)
HGB BLD-MCNC: 15.6 G/DL (ref 14–18)
IMM GRANULOCYTES # BLD AUTO: 0.02 X10(3)/MCL (ref 0–0.04)
IMM GRANULOCYTES NFR BLD AUTO: 0.3 %
KETONES UR QL STRIP.AUTO: NEGATIVE
LEUKOCYTE ESTERASE UR QL STRIP.AUTO: NEGATIVE
LIPASE SERPL-CCNC: 35 U/L
LYMPHOCYTES # BLD AUTO: 1.06 X10(3)/MCL (ref 0.6–4.6)
LYMPHOCYTES NFR BLD AUTO: 17.5 %
MCH RBC QN AUTO: 27.3 PG (ref 27–31)
MCHC RBC AUTO-ENTMCNC: 32.9 G/DL (ref 33–36)
MCV RBC AUTO: 83 FL (ref 80–94)
MONOCYTES # BLD AUTO: 0.53 X10(3)/MCL (ref 0.1–1.3)
MONOCYTES NFR BLD AUTO: 8.8 %
NEUTROPHILS # BLD AUTO: 4.32 X10(3)/MCL (ref 2.1–9.2)
NEUTROPHILS NFR BLD AUTO: 71.6 %
NITRITE UR QL STRIP.AUTO: NEGATIVE
PH UR STRIP.AUTO: 5.5 [PH]
PLATELET # BLD AUTO: 248 X10(3)/MCL (ref 130–400)
PMV BLD AUTO: 9.5 FL (ref 7.4–10.4)
POTASSIUM SERPL-SCNC: 4.4 MMOL/L (ref 3.5–5.1)
PROT SERPL-MCNC: 8.1 GM/DL (ref 5.8–7.6)
PROT UR QL STRIP.AUTO: NEGATIVE
RBC # BLD AUTO: 5.71 X10(6)/MCL (ref 4.7–6.1)
RBC #/AREA URNS AUTO: NORMAL /HPF
RBC UR QL AUTO: NEGATIVE
SODIUM SERPL-SCNC: 138 MMOL/L (ref 136–145)
SP GR UR STRIP.AUTO: >=1.03 (ref 1–1.03)
SQUAMOUS #/AREA URNS AUTO: NORMAL /HPF
UROBILINOGEN UR STRIP-ACNC: 0.2
WBC # SPEC AUTO: 6.04 X10(3)/MCL (ref 4.5–11.5)
WBC #/AREA URNS AUTO: NORMAL /HPF

## 2024-01-01 PROCEDURE — 83690 ASSAY OF LIPASE: CPT | Performed by: FAMILY MEDICINE

## 2024-01-01 PROCEDURE — 96361 HYDRATE IV INFUSION ADD-ON: CPT

## 2024-01-01 PROCEDURE — 63600175 PHARM REV CODE 636 W HCPCS: Performed by: FAMILY MEDICINE

## 2024-01-01 PROCEDURE — 25000003 PHARM REV CODE 250: Performed by: FAMILY MEDICINE

## 2024-01-01 PROCEDURE — 81003 URINALYSIS AUTO W/O SCOPE: CPT | Performed by: FAMILY MEDICINE

## 2024-01-01 PROCEDURE — 85025 COMPLETE CBC W/AUTO DIFF WBC: CPT | Performed by: FAMILY MEDICINE

## 2024-01-01 PROCEDURE — 99285 EMERGENCY DEPT VISIT HI MDM: CPT | Mod: 25

## 2024-01-01 PROCEDURE — 80053 COMPREHEN METABOLIC PANEL: CPT | Performed by: FAMILY MEDICINE

## 2024-01-01 PROCEDURE — 96374 THER/PROPH/DIAG INJ IV PUSH: CPT | Mod: 59

## 2024-01-01 RX ORDER — CIPROFLOXACIN 500 MG/1
500 TABLET ORAL 2 TIMES DAILY
Qty: 20 TABLET | Refills: 0 | Status: SHIPPED | OUTPATIENT
Start: 2024-01-01 | End: 2024-01-11

## 2024-01-01 RX ORDER — TIZANIDINE 4 MG/1
4 TABLET ORAL NIGHTLY PRN
COMMUNITY
Start: 2023-12-04

## 2024-01-01 RX ORDER — BISOPROLOL FUMARATE AND HYDROCHLOROTHIAZIDE 10; 6.25 MG/1; MG/1
1 TABLET ORAL DAILY
COMMUNITY
Start: 2023-01-18

## 2024-01-01 RX ORDER — KETOROLAC TROMETHAMINE 10 MG/1
10 TABLET, FILM COATED ORAL EVERY 6 HOURS
Qty: 12 TABLET | Refills: 0 | Status: SHIPPED | OUTPATIENT
Start: 2024-01-01 | End: 2024-01-04

## 2024-01-01 RX ORDER — KETOROLAC TROMETHAMINE 30 MG/ML
15 INJECTION, SOLUTION INTRAMUSCULAR; INTRAVENOUS
Status: COMPLETED | OUTPATIENT
Start: 2024-01-01 | End: 2024-01-01

## 2024-01-01 RX ORDER — LISINOPRIL 10 MG/1
10 TABLET ORAL DAILY
COMMUNITY
Start: 2023-01-18

## 2024-01-01 RX ORDER — AMLODIPINE BESYLATE 10 MG/1
10 TABLET ORAL DAILY
COMMUNITY
Start: 2023-01-18

## 2024-01-01 RX ADMIN — SODIUM CHLORIDE 500 ML: 9 INJECTION, SOLUTION INTRAVENOUS at 11:01

## 2024-01-01 RX ADMIN — KETOROLAC TROMETHAMINE 15 MG: 30 INJECTION, SOLUTION INTRAMUSCULAR; INTRAVENOUS at 10:01

## 2024-01-01 RX ADMIN — IOPAMIDOL 100 ML: 755 INJECTION, SOLUTION INTRAVENOUS at 11:01

## 2024-01-01 RX ADMIN — SODIUM CHLORIDE 500 ML: 9 INJECTION, SOLUTION INTRAVENOUS at 10:01

## 2024-01-02 VITALS
RESPIRATION RATE: 20 BRPM | HEART RATE: 71 BPM | TEMPERATURE: 98 F | OXYGEN SATURATION: 97 % | HEIGHT: 69 IN | WEIGHT: 215 LBS | DIASTOLIC BLOOD PRESSURE: 91 MMHG | SYSTOLIC BLOOD PRESSURE: 146 MMHG | BODY MASS INDEX: 31.84 KG/M2

## 2024-01-02 PROCEDURE — 25500020 PHARM REV CODE 255: Performed by: FAMILY MEDICINE

## 2024-01-02 PROCEDURE — 25000003 PHARM REV CODE 250: Performed by: FAMILY MEDICINE

## 2024-01-02 NOTE — ED PROVIDER NOTES
Encounter Date: 1/1/2024       History     Chief Complaint   Patient presents with    Abdominal Pain     Pt c/o LLQ abd pain and nausea x 2 mon; reports he had hernia sx about 6 months ago; denies vomiting; last BM was today      Abdominal pain   58-year-old with some abdominal pain distention patient feels like ever since his hernia repair that he is continued to have pain in the abdominal region patient otherwise has no nausea no vomiting no fever chills or night sweats no other complaints no chronic condition contributing to today's episode who patient is history source        Review of patient's allergies indicates:  No Known Allergies  Past Medical History:   Diagnosis Date    Chronic pain disorder     Depression     Hypertension     Stroke      Past Surgical History:   Procedure Laterality Date    COLONOSCOPY N/A 8/24/2023    Procedure: COLONOSCOPY;  Surgeon: Nadir Gregg MD;  Location: [facility];  Service: Endoscopy;  Laterality: N/A;    ROBOT-ASSISTED REPAIR OF UMBILICAL HERNIA USING DA NEIL XI N/A 8/28/2023    Procedure: XI ROBOTIC REPAIR, HERNIA, UMBILICAL;  Surgeon: Nadir Gregg MD;  Location: [facility] OR;  Service: General;  Laterality: N/A;  with mesh     Family History   Problem Relation Age of Onset    Coronary artery disease Mother     Coronary artery disease Father      Social History     Tobacco Use    Smoking status: Former     Types: Cigarettes    Smokeless tobacco: Former   Substance Use Topics    Alcohol use: Yes     Comment: Occasionally    Drug use: Not Currently     Review of Systems   Constitutional:  Negative for fever.   HENT:  Negative for sore throat.    Respiratory:  Negative for shortness of breath.    Cardiovascular:  Negative for chest pain.   Gastrointestinal:  Positive for abdominal pain. Negative for nausea.   Genitourinary:  Negative for dysuria.   Musculoskeletal:  Negative for back pain.   Skin:  Negative for rash.   Neurological:  Negative for weakness.    Hematological:  Does not bruise/bleed easily.   All other systems reviewed and are negative.      Physical Exam     Initial Vitals [01/01/24 2026]   BP Pulse Resp Temp SpO2   (!) 164/99 96 18 97.9 °F (36.6 °C) 96 %      MAP       --         Physical Exam    Nursing note and vitals reviewed.  Constitutional: He appears well-developed and well-nourished. He is not diaphoretic. No distress.   HENT:   Head: Normocephalic and atraumatic.   Right Ear: External ear normal.   Left Ear: External ear normal.   Nose: Nose normal.   Mouth/Throat: Oropharynx is clear and moist. No oropharyngeal exudate.   Eyes: Conjunctivae and EOM are normal. Pupils are equal, round, and reactive to light. Right eye exhibits no discharge. Left eye exhibits no discharge.   Neck: Neck supple. No thyromegaly present. No tracheal deviation present. No JVD present.   Normal range of motion.  Cardiovascular:  Normal rate, regular rhythm, normal heart sounds and intact distal pulses.     Exam reveals no gallop and no friction rub.       No murmur heard.  Pulmonary/Chest: Breath sounds normal. No stridor. No respiratory distress. He has no wheezes. He has no rhonchi. He has no rales. He exhibits no tenderness.   Abdominal: Abdomen is soft. Bowel sounds are normal. He exhibits no distension. There is abdominal tenderness. There is no rebound and no guarding.   Musculoskeletal:         General: No tenderness or edema. Normal range of motion.      Cervical back: Normal range of motion and neck supple.     Lymphadenopathy:     He has no cervical adenopathy.   Neurological: He is alert and oriented to person, place, and time. He has normal strength and normal reflexes. No cranial nerve deficit or sensory deficit. GCS score is 15. GCS eye subscore is 4. GCS verbal subscore is 5. GCS motor subscore is 6.   Skin: Skin is warm and dry. No rash and no abscess noted. No erythema.   Psychiatric: He has a normal mood and affect. His behavior is normal. Judgment  and thought content normal.         ED Course   Procedures  Labs Reviewed   COMPREHENSIVE METABOLIC PANEL - Abnormal; Notable for the following components:       Result Value    Carbon Dioxide 22 (*)     Glucose Level 143 (*)     Creatinine 1.76 (*)     Protein Total 8.1 (*)     Globulin 3.8 (*)     Alanine Aminotransferase 58 (*)     Aspartate Aminotransferase 35 (*)     All other components within normal limits   CBC WITH DIFFERENTIAL - Abnormal; Notable for the following components:    MCHC 32.9 (*)     All other components within normal limits   LIPASE - Normal   URINALYSIS, REFLEX TO URINE CULTURE - Normal   URINALYSIS, MICROSCOPIC - Normal   CBC W/ AUTO DIFFERENTIAL    Narrative:     The following orders were created for panel order CBC W/ AUTO DIFFERENTIAL.  Procedure                               Abnormality         Status                     ---------                               -----------         ------                     CBC with Differential[[id number]]        Abnormal            Final result                 Please view results for these tests on the individual orders.          Imaging Results              CT Abdomen Pelvis With IV Contrast NO Oral Contrast (Preliminary result)  Result time 01/01/24 23:34:14      Preliminary result by Ashkan Patel MD (01/01/24 23:34:14)                   Narrative:    START OF REPORT:  Technique: CT of the abdomen and pelvis was performed with axial images as well as sagittal and coronal reconstruction images with intravenous contrast.    Comparison: None available.    Clinical History: (Pt c/o LLQ abd pain and nausea x 2 mon; reports he had hernia sx about 6 months ago; denies vomiting; last BM was today.    Dosage Information: Automated Exposure Control was utilized 1030.3 mGy.cm.    Findings:  Lines and Tubes: None.  Thorax:  Lungs: There is moderate nonspecific dependent change at the lung bases. No focal infiltrate or consolidation is seen.  Pleura: No  effusions or thickening. No pneumothorax is seen.  Heart: The heart size is within normal limits.  Abdomen:  Abdominal Wall: No abdominal wall pathology is seen.  Liver: Mild fatty infiltration of the liver is present. The liver otherwise appears unremarkable.  Biliary System: No intrahepatic or extrahepatic biliary duct dilatation is seen.  Gallbladder: The gallbladder appears unremarkable.  Pancreas: The pancreas appears unremarkable.  Spleen: The spleen appears unremarkable.  Adrenals: The adrenal glands appear unremarkable.  Kidneys: The kidneys appear unremarkable with no stones cysts masses or hydronephrosis.  Bowel:  Esophagus: The visualized esophagus appears unremarkable.  Stomach: The stomach appears unremarkable.  Duodenum: Unremarkable appearing duodenum.  Small Bowel: The small bowel appears unremarkable.  Colon: Nondistended.  Appendix: The appendix appears unremarkable seen on Image 71, Series 3.  Peritoneum: No intraperitoneal free air or ascites is seen.    Pelvis:  Bladder: The bladder is nondistended however the bladder wall appears thickened after considering state of nondistension.  Male:  Prostate gland: The prostate gland appears unremarkable.    Bony structures:  Dorsal Spine: There is moderate multilevel spondylosis of the visualized dorsal spine.  Bony Pelvis: The visualized bony structures of the pelvis appear unremarkable.      Impression:  1. The bladder is nondistended however the bladder wall appears thickened after considering state of nondistension which could reflect an element of cystitis. Correlate with clinical and laboratory findings.  2. No acute intraabdominal or pelvic solid organ or bowel pathology identified. Details and findings as discussed.                                         Medications   sodium chloride 0.9% bolus 500 mL 500 mL (500 mLs Intravenous New Bag 1/1/24 6939)   sodium chloride 0.9% bolus 500 mL 500 mL (500 mLs Intravenous New Bag 1/1/24 1317)   ketorolac  injection 15 mg (15 mg Intravenous Given 1/1/24 2207)   iopamidoL (ISOVUE-370) injection 100 mL (100 mLs Intravenous Given 1/1/24 1380)     Medical Decision Making  COVID flu pneumonia bronchitis sinusitis pyelonephritis urinary tract infection    Amount and/or Complexity of Data Reviewed  Labs: ordered.  Radiology: ordered.    Risk  Prescription drug management.                                      Clinical Impression:  Final diagnoses:  [N30.90] Cystitis (Primary)          ED Disposition Condition    Discharge Stable          ED Prescriptions       Medication Sig Dispense Start Date End Date Auth. Provider    ciprofloxacin HCl (CIPRO) 500 MG tablet Take 1 tablet (500 mg total) by mouth 2 (two) times daily. for 10 days 20 tablet 1/1/2024 1/11/2024 Savage Chaney MD    ketorolac (TORADOL) 10 mg tablet Take 1 tablet (10 mg total) by mouth every 6 (six) hours. for 3 days 12 tablet 1/1/2024 1/4/2024 Savage Chaney MD          Follow-up Information    None          Savage Chaney MD  01/01/24 2705

## 2024-02-07 ENCOUNTER — HOSPITAL ENCOUNTER (EMERGENCY)
Facility: HOSPITAL | Age: 59
Discharge: HOME | End: 2024-02-07
Attending: FAMILY MEDICINE
Payer: MEDICAID

## 2024-02-07 VITALS
WEIGHT: 210 LBS | RESPIRATION RATE: 18 BRPM | DIASTOLIC BLOOD PRESSURE: 96 MMHG | OXYGEN SATURATION: 97 % | BODY MASS INDEX: 31.1 KG/M2 | HEIGHT: 69 IN | TEMPERATURE: 98 F | SYSTOLIC BLOOD PRESSURE: 181 MMHG | HEART RATE: 101 BPM

## 2024-02-07 DIAGNOSIS — I10 PRIMARY HYPERTENSION: Primary | ICD-10-CM

## 2024-02-07 DIAGNOSIS — R68.89 FLU-LIKE SYMPTOMS: ICD-10-CM

## 2024-02-07 LAB
FLUAV AG UPPER RESP QL IA.RAPID: NOT DETECTED
FLUBV AG UPPER RESP QL IA.RAPID: NOT DETECTED
SARS-COV-2 RNA RESP QL NAA+PROBE: NOT DETECTED
STREP A PCR (OHS): NOT DETECTED

## 2024-02-07 PROCEDURE — 0240U COVID/FLU A&B PCR: CPT | Performed by: FAMILY MEDICINE

## 2024-02-07 PROCEDURE — 25000003 PHARM REV CODE 250: Performed by: FAMILY MEDICINE

## 2024-02-07 PROCEDURE — 99284 EMERGENCY DEPT VISIT MOD MDM: CPT

## 2024-02-07 PROCEDURE — 87651 STREP A DNA AMP PROBE: CPT | Performed by: FAMILY MEDICINE

## 2024-02-07 RX ORDER — AMLODIPINE BESYLATE 5 MG/1
5 TABLET ORAL
Status: COMPLETED | OUTPATIENT
Start: 2024-02-07 | End: 2024-02-07

## 2024-02-07 RX ORDER — ACETAMINOPHEN 500 MG
1000 TABLET ORAL
Status: COMPLETED | OUTPATIENT
Start: 2024-02-07 | End: 2024-02-07

## 2024-02-07 RX ORDER — BISOPROLOL FUMARATE AND HYDROCHLOROTHIAZIDE 10; 6.25 MG/1; MG/1
1 TABLET ORAL DAILY
Qty: 30 TABLET | Refills: 0 | Status: SHIPPED | OUTPATIENT
Start: 2024-02-07

## 2024-02-07 RX ORDER — LISINOPRIL 10 MG/1
10 TABLET ORAL
Status: COMPLETED | OUTPATIENT
Start: 2024-02-07 | End: 2024-02-07

## 2024-02-07 RX ORDER — IBUPROFEN 800 MG/1
800 TABLET ORAL
Status: COMPLETED | OUTPATIENT
Start: 2024-02-07 | End: 2024-02-07

## 2024-02-07 RX ORDER — AMLODIPINE BESYLATE 10 MG/1
10 TABLET ORAL DAILY
Qty: 30 TABLET | Refills: 0 | Status: SHIPPED | OUTPATIENT
Start: 2024-02-07

## 2024-02-07 RX ORDER — LISINOPRIL 10 MG/1
10 TABLET ORAL DAILY
Qty: 30 TABLET | Refills: 0 | Status: SHIPPED | OUTPATIENT
Start: 2024-02-07

## 2024-02-07 RX ADMIN — ACETAMINOPHEN 1000 MG: 500 TABLET ORAL at 10:02

## 2024-02-07 RX ADMIN — LISINOPRIL 10 MG: 10 TABLET ORAL at 10:02

## 2024-02-07 RX ADMIN — AMLODIPINE BESYLATE 5 MG: 5 TABLET ORAL at 10:02

## 2024-02-07 RX ADMIN — IBUPROFEN 800 MG: 800 TABLET, FILM COATED ORAL at 10:02

## 2024-02-07 NOTE — ED PROVIDER NOTES
Encounter Date: 2/7/2024       History     Chief Complaint   Patient presents with    Headache     Pt c/o cough, body aches, and sore throat x 4 days; reports he started with a severe HA yesterday.      58-year-old presents with flu-like symptoms for 4 days body aches cough sore throat no fever no chills also has been out of his blood pressure does chest pain has been having a headache along with a cough and body aches vital signs were stable except otherwise physical exam was unremarkable swabs were negative most likely a flu-like illness parainfluenza 1 it other viral illnesses we will treat the symptoms rest fluids anti-inflammatories for body ache and headache and we will refill his blood pressure medicines and give him some today and ER discussed all findings with the patient treatment plan he is in agreement        Review of patient's allergies indicates:  No Known Allergies  Past Medical History:   Diagnosis Date    Chronic pain disorder     Depression     Hypertension     Stroke      Past Surgical History:   Procedure Laterality Date    COLONOSCOPY N/A 8/24/2023    Procedure: COLONOSCOPY;  Surgeon: Nadir Gregg MD;  Location: [facility];  Service: Endoscopy;  Laterality: N/A;    ROBOT-ASSISTED REPAIR OF UMBILICAL HERNIA USING DA NEIL XI N/A 8/28/2023    Procedure: XI ROBOTIC REPAIR, HERNIA, UMBILICAL;  Surgeon: Nadir Gregg MD;  Location: [facility] OR;  Service: General;  Laterality: N/A;  with mesh     Family History   Problem Relation Age of Onset    Coronary artery disease Mother     Coronary artery disease Father      Social History     Tobacco Use    Smoking status: Former     Types: Cigarettes    Smokeless tobacco: Former   Substance Use Topics    Alcohol use: Yes     Comment: Occasionally    Drug use: Not Currently     Review of Systems   Constitutional:  Positive for fatigue. Negative for fever.   HENT:  Negative for sore throat.    Respiratory:  Positive for cough. Negative for shortness of  breath.    Cardiovascular:  Negative for chest pain.   Gastrointestinal:  Negative for nausea.   Genitourinary:  Negative for dysuria.   Musculoskeletal:  Positive for myalgias. Negative for back pain.   Skin:  Negative for rash.   Neurological:  Positive for headaches. Negative for weakness.   Hematological:  Does not bruise/bleed easily.       Physical Exam     Initial Vitals [02/07/24 0949]   BP Pulse Resp Temp SpO2   (!) 172/108 101 18 97.5 °F (36.4 °C) 97 %      MAP       --         Physical Exam    Nursing note and vitals reviewed.  Constitutional: He appears well-developed and well-nourished. He is active.   HENT:   Head: Normocephalic and atraumatic.   Eyes: Conjunctivae, EOM and lids are normal. Pupils are equal, round, and reactive to light.   Neck: Trachea normal and phonation normal. Neck supple. No thyroid mass present.   Normal range of motion.  Cardiovascular:  Normal rate, regular rhythm, normal heart sounds and normal pulses.           Pulmonary/Chest: Breath sounds normal.   Abdominal: Abdomen is soft. Bowel sounds are normal.   Musculoskeletal:         General: Normal range of motion.      Cervical back: Normal range of motion and neck supple.     Neurological: He is alert and oriented to person, place, and time. He has normal strength and normal reflexes.   Skin: Skin is warm and intact.   Psychiatric: He has a normal mood and affect. His speech is normal and behavior is normal. Judgment and thought content normal. Cognition and memory are normal.         ED Course   Procedures  Labs Reviewed   COVID/FLU A&B PCR - Normal    Narrative:     The Xpert Xpress SARS-CoV-2/FLU/RSV plus is a rapid, multiplexed real-time PCR test intended for the simultaneous qualitative detection and differentiation of SARS-CoV-2, Influenza A, Influenza B, and respiratory syncytial virus (RSV) viral RNA in either nasopharyngeal swab or nasal swab specimens.         STREP GROUP A BY PCR - Normal    Narrative:     The  Xpert Xpress Strep A test is a rapid, qualitative in vitro diagnostic test for the detection of Streptococcus pyogenes (Group A ß-hemolytic Streptococcus, Strep A) in throat swab specimens from patients with signs and symptoms of pharyngitis.            Imaging Results    None          Medications   acetaminophen tablet 1,000 mg (1,000 mg Oral Given 2/7/24 1015)   ibuprofen tablet 800 mg (800 mg Oral Given 2/7/24 1015)   amLODIPine tablet 5 mg (5 mg Oral Given 2/7/24 1054)   lisinopriL tablet 10 mg (10 mg Oral Given 2/7/24 1054)     Medical Decision Making  58-year-old presents with flu-like symptoms for 4 days body aches cough sore throat no fever no chills also has been out of his blood pressure does chest pain has been having a headache along with a cough and body aches vital signs were stable except otherwise physical exam was unremarkable swabs were negative most likely a flu-like illness parainfluenza 1 it other viral illnesses we will treat the symptoms rest fluids anti-inflammatories for body ache and headache and we will refill his blood pressure medicines and give him some today and ER discussed all findings with the patient treatment plan he is in agreement          Amount and/or Complexity of Data Reviewed  Labs: ordered. Decision-making details documented in ED Course.    Risk  OTC drugs.  Prescription drug management.  Risk Details: Differential diagnosis:  COVID flu RSV viral syndrome 40 controlled hypertension secondary to medication noncompliance               ED Course as of 02/07/24 1138   Wed Feb 07, 2024   1050 STREP A PCR ([facility]): Not Detected [BL]   1050 Influenza A, Molecular: Not Detected [BL]   1050 Influenza B, Molecular: Not Detected [BL]   1050 SARS-CoV2 (COVID-19) Qualitative PCR: Not Detected [BL]      ED Course User Index  [BL] Maikel Owens MD                           Clinical Impression:  Final diagnoses:  [I10] Primary hypertension (Primary)  [R68.89] Flu-like symptoms           ED Disposition Condition    Discharge Stable          ED Prescriptions       Medication Sig Dispense Start Date End Date Auth. Provider    amLODIPine (NORVASC) 10 MG tablet Take 1 tablet (10 mg total) by mouth once daily. 30 tablet 2/7/2024 -- Maikel Owens MD    bisoproloL-hydrochlorothiazide (ZIAC) 10-6.25 mg per tablet Take 1 tablet by mouth once daily. 30 tablet 2/7/2024 -- Maikel Owens MD    lisinopriL 10 MG tablet Take 1 tablet (10 mg total) by mouth once daily. 30 tablet 2/7/2024 -- Maikel Owens MD          Follow-up Information       Follow up With Specialties Details Why Contact Info    Arjun Bennett MD Family Medicine In 1 week  [address]  [place] [address]  [phone]               Maikel Owens MD  02/07/24 6883

## 2024-02-07 NOTE — DISCHARGE INSTRUCTIONS
Recommend rest Tylenol Motrin for body aches Tylenol DayQuil NyQuil over-the-counter meds just for symptoms recommend restarting blood pressure medicines be compliant with these meds we will give you 1 month prescription so he can get a follow up with your PCP to get restarted on new meds

## 2024-02-08 ENCOUNTER — HOSPITAL ENCOUNTER (EMERGENCY)
Facility: HOSPITAL | Age: 59
Discharge: HOME | End: 2024-02-08
Attending: EMERGENCY MEDICINE
Payer: MEDICAID

## 2024-02-08 VITALS
RESPIRATION RATE: 12 BRPM | TEMPERATURE: 98 F | OXYGEN SATURATION: 95 % | BODY MASS INDEX: 31.01 KG/M2 | SYSTOLIC BLOOD PRESSURE: 160 MMHG | HEART RATE: 63 BPM | DIASTOLIC BLOOD PRESSURE: 95 MMHG | WEIGHT: 210 LBS

## 2024-02-08 DIAGNOSIS — R53.1 WEAKNESS: ICD-10-CM

## 2024-02-08 DIAGNOSIS — R51.9 NONINTRACTABLE HEADACHE, UNSPECIFIED CHRONICITY PATTERN, UNSPECIFIED HEADACHE TYPE: Primary | ICD-10-CM

## 2024-02-08 LAB
ALBUMIN SERPL-MCNC: 4.1 G/DL (ref 3.4–4.8)
ALBUMIN/GLOB SERPL: 1.1 RATIO (ref 1.1–2)
ALP SERPL-CCNC: 125 UNIT/L (ref 40–150)
ALT SERPL-CCNC: 39 UNIT/L (ref 0–55)
APPEARANCE UR: CLEAR
AST SERPL-CCNC: 26 UNIT/L (ref 5–34)
BACTERIA #/AREA URNS AUTO: ABNORMAL /HPF
BASOPHILS # BLD AUTO: 0.04 X10(3)/MCL
BASOPHILS NFR BLD AUTO: 0.6 %
BILIRUB SERPL-MCNC: 0.4 MG/DL
BILIRUB UR QL STRIP.AUTO: NEGATIVE
BUN SERPL-MCNC: 21 MG/DL (ref 8.4–25.7)
CALCIUM SERPL-MCNC: 9.9 MG/DL (ref 8.8–10)
CHLORIDE SERPL-SCNC: 103 MMOL/L (ref 98–107)
CO2 SERPL-SCNC: 26 MMOL/L (ref 23–31)
COLOR UR AUTO: ABNORMAL
CREAT SERPL-MCNC: 1.17 MG/DL (ref 0.73–1.18)
EOSINOPHIL # BLD AUTO: 0.29 X10(3)/MCL (ref 0–0.9)
EOSINOPHIL NFR BLD AUTO: 4.4 %
ERYTHROCYTE [DISTWIDTH] IN BLOOD BY AUTOMATED COUNT: 12.8 % (ref 11.5–17)
GFR SERPLBLD CREATININE-BSD FMLA CKD-EPI: >60 MLS/MIN/1.73/M2
GLOBULIN SER-MCNC: 3.6 GM/DL (ref 2.4–3.5)
GLUCOSE SERPL-MCNC: 125 MG/DL (ref 82–115)
GLUCOSE UR QL STRIP.AUTO: NORMAL
HCT VFR BLD AUTO: 47.1 % (ref 42–52)
HGB BLD-MCNC: 15.7 G/DL (ref 14–18)
IMM GRANULOCYTES # BLD AUTO: 0.05 X10(3)/MCL (ref 0–0.04)
IMM GRANULOCYTES NFR BLD AUTO: 0.8 %
KETONES UR QL STRIP.AUTO: NEGATIVE
LEUKOCYTE ESTERASE UR QL STRIP.AUTO: NEGATIVE
LYMPHOCYTES # BLD AUTO: 1.33 X10(3)/MCL (ref 0.6–4.6)
LYMPHOCYTES NFR BLD AUTO: 20.2 %
MCH RBC QN AUTO: 27.6 PG (ref 27–31)
MCHC RBC AUTO-ENTMCNC: 33.3 G/DL (ref 33–36)
MCV RBC AUTO: 82.8 FL (ref 80–94)
MONOCYTES # BLD AUTO: 0.74 X10(3)/MCL (ref 0.1–1.3)
MONOCYTES NFR BLD AUTO: 11.3 %
MUCOUS THREADS URNS QL MICRO: ABNORMAL /LPF
NEUTROPHILS # BLD AUTO: 4.12 X10(3)/MCL (ref 2.1–9.2)
NEUTROPHILS NFR BLD AUTO: 62.7 %
NITRITE UR QL STRIP.AUTO: NEGATIVE
NRBC BLD AUTO-RTO: 0 %
OHS QRS DURATION: 84 MS
OHS QTC CALCULATION: 434 MS
PH UR STRIP.AUTO: 5.5 [PH]
PLATELET # BLD AUTO: 244 X10(3)/MCL (ref 130–400)
PMV BLD AUTO: 9.2 FL (ref 7.4–10.4)
POTASSIUM SERPL-SCNC: 4.5 MMOL/L (ref 3.5–5.1)
PROT SERPL-MCNC: 7.7 GM/DL (ref 5.8–7.6)
PROT UR QL STRIP.AUTO: NEGATIVE
RBC # BLD AUTO: 5.69 X10(6)/MCL (ref 4.7–6.1)
RBC #/AREA URNS AUTO: ABNORMAL /HPF
RBC UR QL AUTO: NEGATIVE
SODIUM SERPL-SCNC: 139 MMOL/L (ref 136–145)
SP GR UR STRIP.AUTO: 1.02 (ref 1–1.03)
SQUAMOUS #/AREA URNS LPF: ABNORMAL /HPF
TROPONIN I SERPL-MCNC: <0.01 NG/ML (ref 0–0.04)
UROBILINOGEN UR STRIP-ACNC: NORMAL
WBC # SPEC AUTO: 6.57 X10(3)/MCL (ref 4.5–11.5)
WBC #/AREA URNS AUTO: ABNORMAL /HPF

## 2024-02-08 PROCEDURE — 96372 THER/PROPH/DIAG INJ SC/IM: CPT | Performed by: EMERGENCY MEDICINE

## 2024-02-08 PROCEDURE — 99285 EMERGENCY DEPT VISIT HI MDM: CPT | Mod: 25

## 2024-02-08 PROCEDURE — 25000003 PHARM REV CODE 250: Performed by: EMERGENCY MEDICINE

## 2024-02-08 PROCEDURE — 80053 COMPREHEN METABOLIC PANEL: CPT | Performed by: PHYSICIAN ASSISTANT

## 2024-02-08 PROCEDURE — 85025 COMPLETE CBC W/AUTO DIFF WBC: CPT | Performed by: PHYSICIAN ASSISTANT

## 2024-02-08 PROCEDURE — 93005 ELECTROCARDIOGRAM TRACING: CPT

## 2024-02-08 PROCEDURE — 84484 ASSAY OF TROPONIN QUANT: CPT | Performed by: PHYSICIAN ASSISTANT

## 2024-02-08 PROCEDURE — 93010 ELECTROCARDIOGRAM REPORT: CPT | Mod: ,,, | Performed by: INTERNAL MEDICINE

## 2024-02-08 PROCEDURE — 81001 URINALYSIS AUTO W/SCOPE: CPT | Performed by: PHYSICIAN ASSISTANT

## 2024-02-08 PROCEDURE — 63600175 PHARM REV CODE 636 W HCPCS: Mod: JZ,JG | Performed by: EMERGENCY MEDICINE

## 2024-02-08 RX ORDER — BUTALBITAL, ACETAMINOPHEN AND CAFFEINE 50; 325; 40 MG/1; MG/1; MG/1
1 TABLET ORAL EVERY 4 HOURS PRN
Qty: 20 TABLET | Refills: 0 | Status: SHIPPED | OUTPATIENT
Start: 2024-02-08

## 2024-02-08 RX ORDER — ONDANSETRON 4 MG/1
4 TABLET, FILM COATED ORAL EVERY 6 HOURS
Qty: 12 TABLET | Refills: 0 | Status: SHIPPED | OUTPATIENT
Start: 2024-02-08

## 2024-02-08 RX ORDER — ONDANSETRON 4 MG/1
4 TABLET, ORALLY DISINTEGRATING ORAL
Status: COMPLETED | OUTPATIENT
Start: 2024-02-08 | End: 2024-02-08

## 2024-02-08 RX ORDER — MORPHINE SULFATE 4 MG/ML
8 INJECTION, SOLUTION INTRAMUSCULAR; INTRAVENOUS
Status: COMPLETED | OUTPATIENT
Start: 2024-02-08 | End: 2024-02-08

## 2024-02-08 RX ADMIN — ONDANSETRON 4 MG: 4 TABLET, ORALLY DISINTEGRATING ORAL at 04:02

## 2024-02-08 RX ADMIN — MORPHINE SULFATE 8 MG: 4 INJECTION, SOLUTION INTRAMUSCULAR; INTRAVENOUS at 04:02

## 2024-02-08 NOTE — ED PROVIDER NOTES
Encounter Date: 2/8/2024    SCRIBE #1 NOTE: I, Arian Major, am scribing for, and in the presence of,  Chilo Deluna MD. I have scribed the following portions of the note - Other sections scribed: HPI, ROS, PE.       History     Chief Complaint   Patient presents with    Headache     Pt presents c/o headache posterior to bilateral eyes. +blurred vision. Seen at [facility] ER for HTN/headache yesterday. Denies weakness. Hx brain aneurism, pt states resolved on its own x5 yrs ago. Pt was out of BP meds recently x2 wks, restarted yesterday.      58 year old male with a pmhx of chronic pain disorder, HTN, and a stroke presents to the ED for a headache onset multiple days ago.  The patient reports associated symptoms of emesis, fever, tinnitus, photophobia, and a cough.  The patient reports that his headache is mainly behind his eyes.  The patient states that his headaches come and go, and when he has these episodes they usually last multiple days.  The patient reports that he has never been referred to a neurologist or diagnosed with anything for his headaches.  The patient's PCP is Dr. Arjun Bennett.    The history is provided by the patient. No  was used.   Headache   This is a recurrent problem. The current episode started in the past 7 days. The problem occurs intermittently. The problem has been unchanged. The pain is located in the Retro-orbital region. The pain quality is similar to prior headaches. Associated symptoms include coughing, a fever, photophobia, tinnitus and vomiting. Pertinent negatives include no abdominal pain, nausea, numbness, sore throat or weakness.     Review of patient's allergies indicates:  No Known Allergies  Past Medical History:   Diagnosis Date    Chronic pain disorder     Depression     Hypertension     Stroke      Past Surgical History:   Procedure Laterality Date    COLONOSCOPY N/A 8/24/2023    Procedure: COLONOSCOPY;  Surgeon: Nadir Gregg  MD;  Location: [facility] ENDO;  Service: Endoscopy;  Laterality: N/A;    ROBOT-ASSISTED REPAIR OF UMBILICAL HERNIA USING DA NEIL XI N/A 8/28/2023    Procedure: XI ROBOTIC REPAIR, HERNIA, UMBILICAL;  Surgeon: Nadir Gregg MD;  Location: [facility] OR;  Service: General;  Laterality: N/A;  with mesh     Family History   Problem Relation Age of Onset    Coronary artery disease Mother     Coronary artery disease Father      Social History     Tobacco Use    Smoking status: Former     Types: Cigarettes    Smokeless tobacco: Former   Substance Use Topics    Alcohol use: Yes     Comment: Occasionally    Drug use: Not Currently     Review of Systems   Constitutional:  Positive for fever. Negative for chills and fatigue.   HENT:  Positive for tinnitus. Negative for congestion and sore throat.    Eyes:  Positive for photophobia. Negative for visual disturbance.   Respiratory:  Positive for cough. Negative for shortness of breath.    Cardiovascular:  Negative for chest pain.   Gastrointestinal:  Positive for vomiting. Negative for abdominal pain, diarrhea and nausea.   Genitourinary:  Negative for dysuria.   Musculoskeletal:  Negative for myalgias.   Skin:  Negative for rash.   Neurological:  Positive for headaches. Negative for weakness and numbness.   All other systems reviewed and are negative.      Physical Exam     Initial Vitals [02/08/24 1252]   BP Pulse Resp Temp SpO2   (!) 157/95 76 16 97.7 °F (36.5 °C) 96 %      MAP       --         Physical Exam    Constitutional: He appears well-developed and well-nourished.   HENT:   Head: Normocephalic and atraumatic.   Mouth/Throat: Oropharynx is clear and moist.   Eyes: Pupils are equal, round, and reactive to light.   Neck: Neck supple.   Normal range of motion.  Cardiovascular:  Normal rate, regular rhythm, normal heart sounds and intact distal pulses.           Pulmonary/Chest: Breath sounds normal. No respiratory distress.   Abdominal: Abdomen is soft. Bowel sounds are normal.  There is no abdominal tenderness. There is no rebound and no guarding.   Musculoskeletal:         General: No tenderness or edema. Normal range of motion.      Cervical back: Normal range of motion and neck supple.     Neurological: He is alert and oriented to person, place, and time. He has normal strength. No sensory deficit. GCS score is 15. GCS eye subscore is 4. GCS verbal subscore is 5. GCS motor subscore is 6.   Skin: Skin is warm and dry. Capillary refill takes less than 2 seconds.   Psychiatric: He has a normal mood and affect.         ED Course   Procedures  Labs Reviewed   COMPREHENSIVE METABOLIC PANEL - Abnormal; Notable for the following components:       Result Value    Glucose Level 125 (*)     Protein Total 7.7 (*)     Globulin 3.6 (*)     All other components within normal limits   CBC WITH DIFFERENTIAL - Abnormal; Notable for the following components:    IG# 0.05 (*)     All other components within normal limits   URINALYSIS, REFLEX TO URINE CULTURE - Abnormal; Notable for the following components:    Mucous, UA Trace (*)     All other components within normal limits   TROPONIN I - Normal   CBC W/ AUTO DIFFERENTIAL    Narrative:     The following orders were created for panel order CBC auto differential.  Procedure                               Abnormality         Status                     ---------                               -----------         ------                     CBC with Differential[[id number]]       Abnormal            Final result                 Please view results for these tests on the individual orders.        ECG Results              EKG 12-lead (Final result)        Collection Time Result Time QRS Duration [facility] QTC Calculation    02/08/24 12:51:47 02/08/24 16:45:55 84 434                     Final result by Interface, Lab In [facility] (02/08/24 16:46:04)                   Narrative:    Test Reason : R53.1,    Vent. Rate : 073 BPM     Atrial Rate : 073 BPM     P-R Int : 170 ms           QRS Dur : 084 ms      QT Int : 394 ms       P-R-T Axes : 051 142 003 degrees     QTc Int : 434 ms    Normal sinus rhythm  Right axis deviation  Poor R wave progression across the precordial leads  Abnormal ECG  Confirmed by Frank Garza MD ([id number]) on 2/8/2024 4:45:47 PM    Referred By:             Confirmed By:Frank Garza MD                                  Imaging Results              X-Ray Chest AP Portable (Final result)  Result time 02/08/24 13:26:31      Final result by Art Silva MD (02/08/24 13:26:31)                   Impression:      No acute cardiopulmonary process.      Electronically signed by: Art Silva  Date:    02/08/2024  Time:    13:26               Narrative:    EXAMINATION:  XR CHEST AP PORTABLE    CLINICAL HISTORY:  Weakness    TECHNIQUE:  Single view of the chest    COMPARISON:  08/24/2023    FINDINGS:  No focal opacification, pleural effusion, or pneumothorax.    The cardiomediastinal silhouette is within normal limits.    No acute osseous abnormality.                                       CT Head Without Contrast (Final result)  Result time 02/08/24 13:13:27      Final result by Art Silva MD (02/08/24 13:13:27)                   Impression:      No acute intracranial abnormality identified.      Electronically signed by: Art Silva  Date:    02/08/2024  Time:    13:13               Narrative:    EXAMINATION:  CT HEAD WITHOUT CONTRAST    CLINICAL HISTORY:  Headache, new or worsening (Age >= 50y);history aneurysm;    TECHNIQUE:  Low dose axial images were obtained through the head.  Coronal and sagittal reformations were also performed. Contrast was not administered.    Automatic exposure control was utilized to reduce the patient's radiation dose.    DLP= 1012    COMPARISON:  11/02/2023    FINDINGS:  No acute intracranial hemorrhage, edema or mass. No acute parenchymal abnormality.  Calcification within the left parietal region is unchanged.    There is no  hydrocephalus, evidence of herniation or midline shift. The ventricles and sulci are normal.    There is normal gray white differentiation.    The osseous structures are normal.    The mastoid air cells are clear.    The auditory canals are patent bilaterally.    The globes and orbital contents are normal bilaterally.    The visualized maxillary, ethmoid and sphenoid sinuses are clear.                                       Medications   morphine injection 8 mg (8 mg Intramuscular Given 2/8/24 1626)   ondansetron disintegrating tablet 4 mg (4 mg Oral Given 2/8/24 1626)     Medical Decision Making  The differential diagnoses includes but is not limited to: tension headache, eye strain, sinus headache, migraine, chronic pain.      Amount and/or Complexity of Data Reviewed  Labs: ordered. Decision-making details documented in ED Course.  Radiology: ordered and independent interpretation performed. Decision-making details documented in ED Course.  ECG/medicine tests: ordered and independent interpretation performed. Decision-making details documented in ED Course.    Risk  Prescription drug management.            Scribe Attestation:   Scribe #1: I performed the above scribed service and the documentation accurately describes the services I performed. I attest to the accuracy of the note.    Attending Attestation:           Physician Attestation for Scribe:  Physician Attestation Statement for Scribe #1: I, Chilo Deluna MD, reviewed documentation, as scribed by Arian Major in my presence, and it is both accurate and complete.             ED Course as of 02/08/24 1906   Thu Feb 08, 2024   1617   Patient with fairly longstanding history of intermittent headaches.  No real red flags today, unremarkable workup.  No evidence of meningitis, catastrophic intracranial emergencies.  Had a long conversation with the patient and fiancee and will refer for outpatient Neurology evaluation and recommend patient go for  vision screeningin the interim as well. [MP]      ED Course User Index  [MP] Chilo Deluna MD                           Clinical Impression:  Final diagnoses:  [R53.1] Weakness  [R51.9] Nonintractable headache, unspecified chronicity pattern, unspecified headache type (Primary)          ED Disposition Condition    Discharge Stable          ED Prescriptions       Medication Sig Dispense Start Date End Date Auth. Provider    butalbital-acetaminophen-caffeine -40 mg (FIORICET, ESGIC) -40 mg per tablet Take 1 tablet by mouth every 4 (four) hours as needed for Headaches. 20 tablet 2/8/2024 -- Chilo Deluna MD    ondansetron (ZOFRAN) 4 MG tablet Take 1 tablet (4 mg total) by mouth every 6 (six) hours. 12 tablet 2/8/2024 -- Chilo Deluna MD          Follow-up Information       Follow up With Specialties Details Why Contact Info    Arjun Bennett MD Family Medicine In 1 week  [address]  [facility] [address]  [phone]      Jai Taylor MD Neurology Schedule an appointment as soon as possible for a visit   [address] [place] [address] [address]  [address]  [facility] [address]  [phone]      [facility] - Emergency Dept Emergency Medicine Go to  If symptoms worsen, As needed [address] [facility] [id number] [phone]             Chilo Deluna MD  02/08/24 3707

## 2024-02-08 NOTE — FIRST PROVIDER EVALUATION
Medical screening examination initiated.  I have conducted a focused provider triage encounter, findings are as follows:    No chief complaint on file.      Brief history of present illness:  58 y.o. male presents to the E.D. with c/o headache and hypertension and body aches for several days. Patient went to outlying facility yesterday and underwent covid, flu, strep testing which were negative. Patient was on norvasc, ziac, and lisinopril prior to yesterday however he had run out of his medications about 2 weeks ago. He was given them in the ED yesterday and refilled his medications.     There were no vitals filed for this visit.    Pertinent physical exam:  Awake, Alert, Oriented, Non labored breathing       Brief workup plan:  labs, ct head     Preliminary workup initiated; this workup will be continued and followed by the physician or advanced practice provider that is assigned to the patient when roomed.

## 2024-02-12 DIAGNOSIS — R51.9 FREQUENT HEADACHES: Primary | ICD-10-CM

## 2024-06-26 ENCOUNTER — HOSPITAL ENCOUNTER (EMERGENCY)
Facility: HOSPITAL | Age: 59
Discharge: HOME | End: 2024-06-26
Attending: EMERGENCY MEDICINE
Payer: MEDICAID

## 2024-06-26 VITALS
TEMPERATURE: 98 F | HEART RATE: 96 BPM | DIASTOLIC BLOOD PRESSURE: 98 MMHG | OXYGEN SATURATION: 95 % | HEIGHT: 69 IN | WEIGHT: 215 LBS | RESPIRATION RATE: 20 BRPM | SYSTOLIC BLOOD PRESSURE: 172 MMHG | BODY MASS INDEX: 31.84 KG/M2

## 2024-06-26 DIAGNOSIS — N17.9 AKI (ACUTE KIDNEY INJURY): Primary | ICD-10-CM

## 2024-06-26 LAB
ALBUMIN SERPL-MCNC: 4.3 G/DL (ref 3.4–4.8)
ALBUMIN/GLOB SERPL: 1.3 RATIO (ref 1.1–2)
ALP SERPL-CCNC: 122 UNIT/L (ref 40–150)
ALT SERPL-CCNC: 72 UNIT/L (ref 0–55)
ANION GAP SERPL CALC-SCNC: 11 MEQ/L
ANION GAP SERPL CALC-SCNC: 9 MEQ/L
AST SERPL-CCNC: 44 UNIT/L (ref 5–34)
BACTERIA #/AREA URNS AUTO: NORMAL /HPF
BASOPHILS # BLD AUTO: 0.03 X10(3)/MCL
BASOPHILS NFR BLD AUTO: 0.4 %
BILIRUB SERPL-MCNC: 0.5 MG/DL
BILIRUB UR QL STRIP.AUTO: NEGATIVE
BUN SERPL-MCNC: 25.8 MG/DL (ref 8.4–25.7)
BUN SERPL-MCNC: 30.1 MG/DL (ref 8.4–25.7)
CALCIUM SERPL-MCNC: 10 MG/DL (ref 8.8–10)
CALCIUM SERPL-MCNC: 9 MG/DL (ref 8.8–10)
CHLORIDE SERPL-SCNC: 103 MMOL/L (ref 98–107)
CHLORIDE SERPL-SCNC: 107 MMOL/L (ref 98–107)
CLARITY UR: CLEAR
CO2 SERPL-SCNC: 21 MMOL/L (ref 23–31)
CO2 SERPL-SCNC: 21 MMOL/L (ref 23–31)
COLOR UR AUTO: YELLOW
CREAT SERPL-MCNC: 1.64 MG/DL (ref 0.73–1.18)
CREAT SERPL-MCNC: 2.11 MG/DL (ref 0.73–1.18)
CREAT/UREA NIT SERPL: 14
CREAT/UREA NIT SERPL: 16
EOSINOPHIL # BLD AUTO: 0.09 X10(3)/MCL (ref 0–0.9)
EOSINOPHIL NFR BLD AUTO: 1.2 %
ERYTHROCYTE [DISTWIDTH] IN BLOOD BY AUTOMATED COUNT: 13.1 % (ref 11.5–17)
GFR SERPLBLD CREATININE-BSD FMLA CKD-EPI: 35 ML/MIN/1.73/M2
GFR SERPLBLD CREATININE-BSD FMLA CKD-EPI: 48 ML/MIN/1.73/M2
GLOBULIN SER-MCNC: 3.4 GM/DL (ref 2.4–3.5)
GLUCOSE SERPL-MCNC: 180 MG/DL (ref 82–115)
GLUCOSE SERPL-MCNC: 238 MG/DL (ref 82–115)
GLUCOSE UR QL STRIP: 500
HCT VFR BLD AUTO: 41.7 % (ref 42–52)
HGB BLD-MCNC: 14.5 G/DL (ref 14–18)
HGB UR QL STRIP: NEGATIVE
IMM GRANULOCYTES # BLD AUTO: 0.02 X10(3)/MCL (ref 0–0.04)
IMM GRANULOCYTES NFR BLD AUTO: 0.3 %
KETONES UR QL STRIP: NEGATIVE
LACTATE SERPL-SCNC: 2 MMOL/L (ref 0.5–2.2)
LEUKOCYTE ESTERASE UR QL STRIP: NEGATIVE
LIPASE SERPL-CCNC: 28 U/L
LYMPHOCYTES # BLD AUTO: 1.03 X10(3)/MCL (ref 0.6–4.6)
LYMPHOCYTES NFR BLD AUTO: 13.3 %
MCH RBC QN AUTO: 28.3 PG (ref 27–31)
MCHC RBC AUTO-ENTMCNC: 34.8 G/DL (ref 33–36)
MCV RBC AUTO: 81.4 FL (ref 80–94)
MONOCYTES # BLD AUTO: 0.64 X10(3)/MCL (ref 0.1–1.3)
MONOCYTES NFR BLD AUTO: 8.3 %
NEUTROPHILS # BLD AUTO: 5.93 X10(3)/MCL (ref 2.1–9.2)
NEUTROPHILS NFR BLD AUTO: 76.5 %
NITRITE UR QL STRIP: NEGATIVE
PH UR STRIP: 5.5 [PH]
PLATELET # BLD AUTO: 214 X10(3)/MCL (ref 130–400)
PMV BLD AUTO: 9.4 FL (ref 7.4–10.4)
POTASSIUM SERPL-SCNC: 4.1 MMOL/L (ref 3.5–5.1)
POTASSIUM SERPL-SCNC: 4.3 MMOL/L (ref 3.5–5.1)
PROT SERPL-MCNC: 7.7 GM/DL (ref 5.8–7.6)
PROT UR QL STRIP: 30
RBC # BLD AUTO: 5.12 X10(6)/MCL (ref 4.7–6.1)
RBC #/AREA URNS AUTO: NORMAL /HPF
SODIUM SERPL-SCNC: 135 MMOL/L (ref 136–145)
SODIUM SERPL-SCNC: 137 MMOL/L (ref 136–145)
SP GR UR STRIP.AUTO: 1.02 (ref 1–1.03)
SQUAMOUS #/AREA URNS AUTO: NORMAL /HPF
UROBILINOGEN UR STRIP-ACNC: 0.2
WBC # BLD AUTO: 7.74 X10(3)/MCL (ref 4.5–11.5)
WBC #/AREA URNS AUTO: NORMAL /HPF

## 2024-06-26 PROCEDURE — 25000003 PHARM REV CODE 250: Performed by: EMERGENCY MEDICINE

## 2024-06-26 PROCEDURE — 83605 ASSAY OF LACTIC ACID: CPT | Performed by: EMERGENCY MEDICINE

## 2024-06-26 PROCEDURE — 96360 HYDRATION IV INFUSION INIT: CPT

## 2024-06-26 PROCEDURE — 81003 URINALYSIS AUTO W/O SCOPE: CPT | Performed by: EMERGENCY MEDICINE

## 2024-06-26 PROCEDURE — 99284 EMERGENCY DEPT VISIT MOD MDM: CPT | Mod: 25

## 2024-06-26 PROCEDURE — 80053 COMPREHEN METABOLIC PANEL: CPT | Performed by: EMERGENCY MEDICINE

## 2024-06-26 PROCEDURE — 96361 HYDRATE IV INFUSION ADD-ON: CPT

## 2024-06-26 PROCEDURE — 85025 COMPLETE CBC W/AUTO DIFF WBC: CPT | Performed by: EMERGENCY MEDICINE

## 2024-06-26 PROCEDURE — 83690 ASSAY OF LIPASE: CPT | Performed by: EMERGENCY MEDICINE

## 2024-06-26 RX ORDER — DILTIAZEM HYDROCHLORIDE 5 MG/ML
20 INJECTION INTRAVENOUS
Status: DISCONTINUED | OUTPATIENT
Start: 2024-06-26 | End: 2024-06-26

## 2024-06-26 RX ADMIN — SODIUM CHLORIDE 1000 ML: 9 INJECTION, SOLUTION INTRAVENOUS at 06:06

## 2024-06-26 RX ADMIN — SODIUM CHLORIDE 1000 ML: 9 INJECTION, SOLUTION INTRAVENOUS at 05:06

## 2024-06-26 NOTE — ED PROVIDER NOTES
Encounter Date: 6/26/2024       History     Chief Complaint   Patient presents with    Abdominal Pain     Left sided abdominal pain and diarrhea  for few days . Last normal bowel movement one week ago hx of hernia surgery 10 months ago      Patient is a 58-year-old male who presents to the emergency room with a 3 day history of left-sided abdominal pain that has been increasing.  He states that he has not had a normal bowel movement in approximately a week.  The problems have been present intermittently for at least 8-10 months since he had a hernia repair surgery.  He denies any acute fever or vomiting.  He does say that this occasion of pain has been 1 of his more significant bouts.  He reports intermittent diarrhea alternating with constipation for several months.  He has had a colonoscopy within the last year.      Review of patient's allergies indicates:  No Known Allergies  Past Medical History:   Diagnosis Date    Chronic pain disorder     Depression     Hypertension     Stroke      Past Surgical History:   Procedure Laterality Date    COLONOSCOPY N/A 8/24/2023    Procedure: COLONOSCOPY;  Surgeon: Nadir Gregg MD;  Location: [facility];  Service: Endoscopy;  Laterality: N/A;    ROBOT-ASSISTED REPAIR OF UMBILICAL HERNIA USING DA NEIL XI N/A 8/28/2023    Procedure: XI ROBOTIC REPAIR, HERNIA, UMBILICAL;  Surgeon: Nadir Gregg MD;  Location: [facility] OR;  Service: General;  Laterality: N/A;  with mesh     Family History   Problem Relation Name Age of Onset    Coronary artery disease Mother      Coronary artery disease Father       Social History     Tobacco Use    Smoking status: Former     Types: Cigarettes    Smokeless tobacco: Former   Substance Use Topics    Alcohol use: Yes     Comment: Occasionally    Drug use: Not Currently     Review of Systems   Constitutional:  Negative for fever.   HENT:  Negative for sore throat.    Respiratory:  Negative for shortness of breath.    Cardiovascular:  Negative  for chest pain.   Gastrointestinal:  Positive for abdominal distention, abdominal pain, constipation, diarrhea and nausea.   Genitourinary:  Negative for dysuria.   Musculoskeletal:  Negative for back pain.   Skin:  Negative for rash.   Neurological:  Negative for weakness.   Hematological:  Does not bruise/bleed easily.   All other systems reviewed and are negative.      Physical Exam     Initial Vitals [06/26/24 1627]   BP Pulse Resp Temp SpO2   (!) 143/63 (!) 124 20 97.5 °F (36.4 °C) 97 %      MAP       --         Physical Exam    Nursing note and vitals reviewed.  Constitutional: Vital signs are normal. He appears well-nourished. He is cooperative.  Non-toxic appearance. He does not appear ill. No distress.   HENT:   Head: Normocephalic and atraumatic.   Mouth/Throat: Uvula is midline and oropharynx is clear and moist. No trismus in the jaw.   Eyes: Conjunctivae, EOM and lids are normal. Pupils are equal, round, and reactive to light.   Neck: Trachea normal. Neck supple. No stridor present. No tracheal deviation present. No JVD present.   Normal range of motion.  Cardiovascular:  Regular rhythm, normal heart sounds and normal pulses.   Tachycardia present.         No murmur heard.  Abdominal: Abdomen is soft. Bowel sounds are normal. There is no abdominal tenderness. There is no rebound and no guarding.   Musculoskeletal:      Cervical back: Normal range of motion and neck supple. No rigidity.     Neurological: He is alert and oriented to person, place, and time. He has normal strength. No cranial nerve deficit or sensory deficit. GCS eye subscore is 4. GCS verbal subscore is 5. GCS motor subscore is 6.   Skin: Skin is warm, dry and intact. Capillary refill takes less than 2 seconds.   Psychiatric: He has a normal mood and affect. His behavior is normal. Judgment normal. He is not actively hallucinating. Thought content is not delusional. He expresses no homicidal and no suicidal ideation.         ED Course    Critical Care    Date/Time: 6/26/2024 8:49 PM    Performed by: Earle Sanches MD  Authorized by: Jose Taylor MD  Direct patient critical care time: 10 minutes  Additional history critical care time: 10 minutes  Ordering / reviewing critical care time: 10 minutes  Documentation critical care time: 10 minutes  Total critical care time (exclusive of procedural time) : 40 minutes  Critical care was necessary to treat or prevent imminent or life-threatening deterioration of the following conditions: renal failure.  Critical care was time spent personally by me on the following activities: blood draw for specimens, development of treatment plan with patient or surrogate, evaluation of patient's response to treatment, examination of patient, obtaining history from patient or surrogate, ordering and performing treatments and interventions, ordering and review of laboratory studies, ordering and review of radiographic studies, pulse oximetry and re-evaluation of patient's condition.        Labs Reviewed   COMPREHENSIVE METABOLIC PANEL - Abnormal; Notable for the following components:       Result Value    Sodium 135 (*)     CO2 21 (*)     Glucose 238 (*)     Blood Urea Nitrogen 30.1 (*)     Creatinine 2.11 (*)     Protein Total 7.7 (*)     ALT 72 (*)     AST 44 (*)     All other components within normal limits   URINALYSIS, REFLEX TO URINE CULTURE - Abnormal; Notable for the following components:    Protein, UA 30 (*)     Glucose,  (*)     All other components within normal limits   CBC WITH DIFFERENTIAL - Abnormal; Notable for the following components:    Hct 41.7 (*)     All other components within normal limits   BASIC METABOLIC PANEL - Abnormal; Notable for the following components:    CO2 21 (*)     Glucose 180 (*)     Blood Urea Nitrogen 25.8 (*)     Creatinine 1.64 (*)     All other components within normal limits   LIPASE - Normal   LACTIC ACID, PLASMA - Normal   URINALYSIS, MICROSCOPIC - Normal   CBC W/  AUTO DIFFERENTIAL    Narrative:     The following orders were created for panel order CBC W/ AUTO DIFFERENTIAL.  Procedure                               Abnormality         Status                     ---------                               -----------         ------                     CBC with Differential[[id number]]       Abnormal            Final result                 Please view results for these tests on the individual orders.          Imaging Results              CT Abdomen Pelvis  Without Contrast (Final result)  Result time 06/26/24 18:03:05      Final result by Vickie Mayfield MD (06/26/24 18:03:05)                   Impression:      Hepatomegaly otherwise unremarkable      Electronically signed by: Demetrius Mayfield  Date:    06/26/2024  Time:    18:03               Narrative:    EXAMINATION:  CT ABDOMEN PELVIS WITHOUT CONTRAST    CLINICAL HISTORY:  Abdominal pain, acute, nonlocalized;    TECHNIQUE:  Low dose axial images, sagittal and coronal reformations were obtained from the lung bases to the pubic symphysis.  No contrast was administered.  Automatic exposure control is utilized to reduce patient radiation exposure.    COMPARISON:  01/01/2024    FINDINGS:  The lung bases are clear.    The liver is enlarged in size.  No obvious liver mass or lesion is seen.    Gallbladder appears normal.  No gallstones are seen.    The pancreas appears normal.  No inflammatory changes are seen in the pancreatic region.    The spleen appears normal and adrenal glands appear normal.  No adrenal nodule is seen.    No nephrolithiasis is seen.  No hydronephrosis is seen.  No hydroureter is seen.  No ureteral stone is seen.    No colitis is seen.  No diverticulitis is seen.  No appendicitis is seen.    No free air seen.  No free fluid is seen.  The urinary bladder appears normal.    Bones show no acute abnormality.                                       Medications   sodium chloride 0.9% bolus 1,000 mL 1,000 mL (  Intravenous Stopped 6/26/24 1842)   sodium chloride 0.9% bolus 1,000 mL 1,000 mL (0 mLs Intravenous Stopped 6/26/24 1942)     Medical Decision Making  Differentials:  Diverticulitis, constipation, gas pains, dehydration, electrolyte disturbance   58-year-old well-appearing male presents to the ER today with a chronic lower abdominal pain.  Patient states that he had a colonoscopy within the last 1 year which showed no acute findings.  He also had hernia repair which he attributes his pain being due to.  Basic labs showed no leukocytosis and a slightly elevated creatinine from baseline.  Fluids were given and creatinine improved.  CT abdomen showed no acute findings.  Unsure of the true etiology but as advised patient to follow up with GI as he was already been established.  All questions were answered in layman's terms and return precautions were discussed.    Amount and/or Complexity of Data Reviewed  Labs: ordered. Decision-making details documented in ED Course.     Details: CBC was obtained to evaluate for anemia and for evidence of infection that could be seen with a white blood cell count elevation.    A CMP was ordered to evaluate the renal function for evidence of acute kidney injury based on an elevated creatinine, uremia based on an elevated BUN or suggestion of dehydration but elevated BUN/creatinine ratio.  We will also be able to screen for liver or biliary tract disease by assessing the AST/ALT levels.  Biliary obstruction can be assessed by reviewing the alkaline phosphatase and bilirubin levels.  Electrolyte abnormalities will also be ruled out with levels of the potassium, sodium and magnesium levels being evaluated.  A glucose reading will rule out hypo or hyperglycemia.  Will calculate the anion gap and assess for metabolic acidosis.    A urinalysis will be done to rule out UTI.  Hematuria on the urinalysis may suggest a kidney stone or other pathology.  We will also assess for proteinuria on the  urinalysis.    A CT scan of the abdomen was done to rule out intra-abdominal processes that could require urgent surgical intervention such as acute appendicitis, acute cholecystitis, small bowel obstruction or evidence of perforation with free air or perhaps mesenteric ischemia.  The aorta can also be assessed to assess for aneurysmal change.  Evidence of pancreatitis can also be evaluated by review of the CT images.  Nonsurgical issues such as constipation, mesenteric adenitis, cholelithiasis without cholecystitis or colitis/enteritis can also be assessed.  Nephrolithiasis/ureterolithiasis with or without evidence of hydronephrosis can also be assessed  Radiology: ordered. Decision-making details documented in ED Course.               ED Course as of 06/26/24 2050 Wed Jun 26, 2024 1700 I have reviewed the patient's Social Determinants of Health (SDOH), or non-medical factors that may impact their overall health. There are five classifications of SDOH, according to the [place] Office of Disease Prevention and Health Promotion:    Economic stability  Education  Health and healthcare  Neighborhood and built environment  Social and community context    After review of these five areas of determinants, I have not identified any specific barriers to healthcare delivery to this patient at this time.   [DB]   1737 Lactic acid, plasma [DB]   1737 WBC: 7.74  Normal [DB]   1753 This patient's care has been transferred to the incoming physician. We have discussed: the patient's chief complaint; any labs and imaging that have been completed and those that are still pending; procedures that have been completed (if any) and those remaining to be done; any treatment provided and the patient's response to treatment; input from consultants (if any); the remaining treatment plan. The incoming physician will follow up on all pending labs and imaging, make any necessary changes to the current impression and/or treatment plan  and provide a final disposition. [DB]      ED Course User Index  [DB] Jose Taylor MD                           Clinical Impression:  Final diagnoses:  [N17.9] TIRSO (acute kidney injury) (Primary)          ED Disposition Condition    Discharge Stable          ED Prescriptions    None       Follow-up Information       Follow up With Specialties Details Why Contact Info    [facility] - Emergency Dept Emergency Medicine  If symptoms worsen [address] [facility] [id number] [phone]    Arjun Bennett MD Family Medicine Schedule an appointment as soon as possible for a visit   [address] [facility]  [facility] [address]  [phone]               Earle Sanches MD  06/26/24 2050

## 2024-09-02 ENCOUNTER — HOSPITAL ENCOUNTER (EMERGENCY)
Facility: HOSPITAL | Age: 59
Discharge: HOME | End: 2024-09-03
Payer: MEDICAID

## 2024-09-02 DIAGNOSIS — K52.9 ENTERITIS: Primary | ICD-10-CM

## 2024-09-02 DIAGNOSIS — R10.84 GENERALIZED ABDOMINAL PAIN: ICD-10-CM

## 2024-09-02 DIAGNOSIS — R10.9 ABDOMINAL PAIN: ICD-10-CM

## 2024-09-02 LAB
ALBUMIN SERPL-MCNC: 4.3 G/DL (ref 3.4–4.8)
ALBUMIN/GLOB SERPL: 1 RATIO (ref 1.1–2)
ALP SERPL-CCNC: 128 UNIT/L (ref 40–150)
ALT SERPL-CCNC: 95 UNIT/L (ref 0–55)
ANION GAP SERPL CALC-SCNC: 12 MEQ/L
AST SERPL-CCNC: 52 UNIT/L (ref 5–34)
BACTERIA #/AREA URNS AUTO: ABNORMAL /HPF
BASOPHILS # BLD AUTO: 0.04 X10(3)/MCL
BASOPHILS NFR BLD AUTO: 0.5 %
BILIRUB SERPL-MCNC: 0.4 MG/DL
BILIRUB UR QL STRIP.AUTO: NEGATIVE
BUN SERPL-MCNC: 26.4 MG/DL (ref 8.4–25.7)
CALCIUM SERPL-MCNC: 10.8 MG/DL (ref 8.8–10)
CHLORIDE SERPL-SCNC: 101 MMOL/L (ref 98–107)
CLARITY UR: CLEAR
CO2 SERPL-SCNC: 22 MMOL/L (ref 23–31)
COLOR UR AUTO: YELLOW
CREAT SERPL-MCNC: 2.21 MG/DL (ref 0.73–1.18)
CREAT/UREA NIT SERPL: 12
EOSINOPHIL # BLD AUTO: 0.07 X10(3)/MCL (ref 0–0.9)
EOSINOPHIL NFR BLD AUTO: 0.9 %
ERYTHROCYTE [DISTWIDTH] IN BLOOD BY AUTOMATED COUNT: 12.7 % (ref 11.5–17)
GFR SERPLBLD CREATININE-BSD FMLA CKD-EPI: 33 ML/MIN/1.73/M2
GLOBULIN SER-MCNC: 4.1 GM/DL (ref 2.4–3.5)
GLUCOSE SERPL-MCNC: 200 MG/DL (ref 82–115)
GLUCOSE UR QL STRIP: 250
HCT VFR BLD AUTO: 44.7 % (ref 42–52)
HGB BLD-MCNC: 15.7 G/DL (ref 14–18)
HGB UR QL STRIP: NEGATIVE
HYALINE CASTS URNS QL MICRO: ABNORMAL /LPF
IMM GRANULOCYTES # BLD AUTO: 0.06 X10(3)/MCL (ref 0–0.04)
IMM GRANULOCYTES NFR BLD AUTO: 0.7 %
KETONES UR QL STRIP: ABNORMAL
LACTATE SERPL-SCNC: 1.7 MMOL/L (ref 0.5–2.2)
LEUKOCYTE ESTERASE UR QL STRIP: NEGATIVE
LIPASE SERPL-CCNC: 28 U/L
LYMPHOCYTES # BLD AUTO: 1.09 X10(3)/MCL (ref 0.6–4.6)
LYMPHOCYTES NFR BLD AUTO: 13.4 %
MCH RBC QN AUTO: 28.4 PG (ref 27–31)
MCHC RBC AUTO-ENTMCNC: 35.1 G/DL (ref 33–36)
MCV RBC AUTO: 81 FL (ref 80–94)
MONOCYTES # BLD AUTO: 0.88 X10(3)/MCL (ref 0.1–1.3)
MONOCYTES NFR BLD AUTO: 10.8 %
NEUTROPHILS # BLD AUTO: 6.02 X10(3)/MCL (ref 2.1–9.2)
NEUTROPHILS NFR BLD AUTO: 73.7 %
NITRITE UR QL STRIP: NEGATIVE
PH UR STRIP: 5.5 [PH]
PLATELET # BLD AUTO: 214 X10(3)/MCL (ref 130–400)
PMV BLD AUTO: 9.4 FL (ref 7.4–10.4)
POTASSIUM SERPL-SCNC: 4.1 MMOL/L (ref 3.5–5.1)
PROT SERPL-MCNC: 8.4 GM/DL (ref 5.8–7.6)
PROT UR QL STRIP: ABNORMAL
RBC # BLD AUTO: 5.52 X10(6)/MCL (ref 4.7–6.1)
RBC #/AREA URNS AUTO: ABNORMAL /HPF
SODIUM SERPL-SCNC: 135 MMOL/L (ref 136–145)
SP GR UR STRIP.AUTO: >=1.03 (ref 1–1.03)
SPERM URNS QL MICRO: ABNORMAL /HPF
SQUAMOUS #/AREA URNS AUTO: ABNORMAL /HPF
UROBILINOGEN UR STRIP-ACNC: 0.2
WBC # BLD AUTO: 8.16 X10(3)/MCL (ref 4.5–11.5)
WBC #/AREA URNS AUTO: ABNORMAL /HPF

## 2024-09-02 PROCEDURE — 80053 COMPREHEN METABOLIC PANEL: CPT

## 2024-09-02 PROCEDURE — 81003 URINALYSIS AUTO W/O SCOPE: CPT

## 2024-09-02 PROCEDURE — 25500020 PHARM REV CODE 255

## 2024-09-02 PROCEDURE — 93005 ELECTROCARDIOGRAM TRACING: CPT

## 2024-09-02 PROCEDURE — 99285 EMERGENCY DEPT VISIT HI MDM: CPT | Mod: 25

## 2024-09-02 PROCEDURE — 83605 ASSAY OF LACTIC ACID: CPT

## 2024-09-02 PROCEDURE — 63600175 PHARM REV CODE 636 W HCPCS

## 2024-09-02 PROCEDURE — 96374 THER/PROPH/DIAG INJ IV PUSH: CPT | Mod: 59

## 2024-09-02 PROCEDURE — 93010 ELECTROCARDIOGRAM REPORT: CPT | Mod: ,,, | Performed by: INTERNAL MEDICINE

## 2024-09-02 PROCEDURE — 83690 ASSAY OF LIPASE: CPT

## 2024-09-02 PROCEDURE — 96375 TX/PRO/DX INJ NEW DRUG ADDON: CPT

## 2024-09-02 PROCEDURE — 96361 HYDRATE IV INFUSION ADD-ON: CPT

## 2024-09-02 PROCEDURE — 81001 URINALYSIS AUTO W/SCOPE: CPT

## 2024-09-02 PROCEDURE — 85025 COMPLETE CBC W/AUTO DIFF WBC: CPT

## 2024-09-02 RX ORDER — ONDANSETRON HYDROCHLORIDE 2 MG/ML
4 INJECTION, SOLUTION INTRAVENOUS
Status: COMPLETED | OUTPATIENT
Start: 2024-09-02 | End: 2024-09-02

## 2024-09-02 RX ORDER — MORPHINE SULFATE 4 MG/ML
4 INJECTION, SOLUTION INTRAMUSCULAR; INTRAVENOUS
Status: COMPLETED | OUTPATIENT
Start: 2024-09-02 | End: 2024-09-02

## 2024-09-02 RX ADMIN — ONDANSETRON 4 MG: 2 INJECTION INTRAMUSCULAR; INTRAVENOUS at 10:09

## 2024-09-02 RX ADMIN — MORPHINE SULFATE 4 MG: 4 INJECTION, SOLUTION INTRAMUSCULAR; INTRAVENOUS at 10:09

## 2024-09-02 RX ADMIN — SODIUM CHLORIDE, POTASSIUM CHLORIDE, SODIUM LACTATE AND CALCIUM CHLORIDE 1000 ML: 600; 310; 30; 20 INJECTION, SOLUTION INTRAVENOUS at 10:09

## 2024-09-02 RX ADMIN — IOHEXOL 100 ML: 350 INJECTION, SOLUTION INTRAVENOUS at 11:09

## 2024-09-02 NOTE — Clinical Note
"Sarabjit Garcias" Rama was seen and treated in our emergency department on 9/2/2024.  He may return to work on 09/04/2024.       If you have any questions or concerns, please don't hesitate to call.      Summer Will RN"

## 2024-09-03 VITALS
RESPIRATION RATE: 20 BRPM | HEIGHT: 69 IN | DIASTOLIC BLOOD PRESSURE: 97 MMHG | WEIGHT: 220 LBS | SYSTOLIC BLOOD PRESSURE: 137 MMHG | HEART RATE: 74 BPM | TEMPERATURE: 98 F | OXYGEN SATURATION: 95 % | BODY MASS INDEX: 32.58 KG/M2

## 2024-09-03 LAB
OHS QRS DURATION: 94 MS
OHS QTC CALCULATION: 455 MS

## 2024-09-03 PROCEDURE — 96372 THER/PROPH/DIAG INJ SC/IM: CPT

## 2024-09-03 PROCEDURE — 63600175 PHARM REV CODE 636 W HCPCS

## 2024-09-03 RX ORDER — DICYCLOMINE HYDROCHLORIDE 10 MG/ML
20 INJECTION INTRAMUSCULAR
Status: COMPLETED | OUTPATIENT
Start: 2024-09-03 | End: 2024-09-03

## 2024-09-03 RX ORDER — DICYCLOMINE HYDROCHLORIDE 20 MG/1
20 TABLET ORAL 2 TIMES DAILY
Qty: 20 TABLET | Refills: 0 | Status: SHIPPED | OUTPATIENT
Start: 2024-09-03 | End: 2024-10-03

## 2024-09-03 RX ORDER — ONDANSETRON 4 MG/1
4 TABLET, FILM COATED ORAL EVERY 6 HOURS
Qty: 20 TABLET | Refills: 0 | Status: SHIPPED | OUTPATIENT
Start: 2024-09-03

## 2024-09-03 RX ORDER — HYDROMORPHONE HYDROCHLORIDE 2 MG/ML
0.5 INJECTION, SOLUTION INTRAMUSCULAR; INTRAVENOUS; SUBCUTANEOUS
Status: COMPLETED | OUTPATIENT
Start: 2024-09-03 | End: 2024-09-03

## 2024-09-03 RX ADMIN — HYDROMORPHONE HYDROCHLORIDE 0.5 MG: 2 INJECTION INTRAMUSCULAR; INTRAVENOUS; SUBCUTANEOUS at 02:09

## 2024-09-03 RX ADMIN — DICYCLOMINE HYDROCHLORIDE 20 MG: 20 INJECTION, SOLUTION INTRAMUSCULAR at 02:09

## 2024-09-03 NOTE — DISCHARGE INSTRUCTIONS
As discussed in the Emergency Department prior to discharge, you have been diagnosed with [nausea/vomiting/diarrhea] that is most likely due to gastroenteritis (sometimes referred to as a stomach flu).  Gastroenteritis is an infection of the gut.  This can be caused by a bacteria or a parasite, but is most commonly caused by a virus.  Antibiotics do not work on viruses (they only work on bacteria), so there is no point in taking an antibiotic for most cases of gastroenteritis.     The most important thing for you to do is to stay hydrated.  If you or your child is vomiting, do not try to drink lots of fluid immediately after vomiting.  Wait a few hours after the vomiting has stopped and then try to drink one tablespoon of fluid.  If this causes more vomiting, wait a few more hours after the vomiting has stopped and then try again.  If it doesnt cause more vomiting, continue hydrating yourself one tablespoon at a time every 10 minutes until you are able to tolerate more.  You may also have been prescribed a medication to treat nausea and prevent vomiting, such as Ondansetron (Zofran).  Take this medication as directed for the next 8 hours.     The fluid can be water, Pedialyte, a sports drink like Gatorade, or flat ginger-siomara.  If you can keep down the fluids for a few hours without vomiting, you can try eating food.  Choose foods that are very plain, such as the B.R.A.T. diet (Bananas, Rice, Apple sauce and Toast).  You can also eat plain chicken (without the skin) and clear soups and crackers.     The vomiting usually lasts 12-24 hours, and is followed by diarrhea for several days.  If the diarrhea is very bad, you can take an over-the-counter anti-diarrheal medication for it.  It is generally thought to be a good idea to wait at least one to two days after the diarrhea starts before taking any anti-diarrheal medication.     Return to the Emergency Department if you have signs of dehydration despite treatment, a  dry mouth with no visible saliva, lethargy, high fever (>=40.0°C / 104.0°F), blood in your diarrhea, abdominal pain that move to one side of the abdomen, or have any other new or concerning symptoms.
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
## 2024-10-04 ENCOUNTER — LAB VISIT (OUTPATIENT)
Dept: LAB | Facility: HOSPITAL | Age: 59
End: 2024-10-04
Attending: SURGERY
Payer: MEDICAID

## 2024-10-04 DIAGNOSIS — Z12.11 SPECIAL SCREENING FOR MALIGNANT NEOPLASMS, COLON: ICD-10-CM

## 2024-10-04 DIAGNOSIS — E11.9 DIABETES MELLITUS WITHOUT COMPLICATION: Primary | ICD-10-CM

## 2024-10-04 LAB
EST. AVERAGE GLUCOSE BLD GHB EST-MCNC: 220.2 MG/DL
HBA1C MFR BLD: 9.3 %
TSH SERPL-ACNC: 0.43 UIU/ML (ref 0.35–4.94)

## 2024-10-04 PROCEDURE — 36415 COLL VENOUS BLD VENIPUNCTURE: CPT

## 2024-10-04 PROCEDURE — 83036 HEMOGLOBIN GLYCOSYLATED A1C: CPT

## 2024-10-04 PROCEDURE — 84443 ASSAY THYROID STIM HORMONE: CPT

## 2024-10-07 DIAGNOSIS — K40.90 UNILATERAL INGUINAL HERNIA, WITHOUT OBSTRUCTION OR GANGRENE, NOT SPECIFIED AS RECURRENT: ICD-10-CM

## 2024-10-07 DIAGNOSIS — R10.13 EPIGASTRIC PAIN: Primary | ICD-10-CM

## 2024-10-08 ENCOUNTER — LAB VISIT (OUTPATIENT)
Dept: LAB | Facility: HOSPITAL | Age: 59
End: 2024-10-08
Attending: SURGERY
Payer: MEDICAID

## 2024-10-08 DIAGNOSIS — Z12.11 SPECIAL SCREENING FOR MALIGNANT NEOPLASMS, COLON: Primary | ICD-10-CM

## 2024-10-08 DIAGNOSIS — E11.9 DIABETES MELLITUS WITHOUT COMPLICATION: ICD-10-CM

## 2024-10-08 LAB
HEMOCCULT SP1 STL QL: NEGATIVE
HEMOCCULT SP2 STL QL: NEGATIVE
HEMOCCULT SP3 STL QL: NEGATIVE

## 2024-10-08 PROCEDURE — 82270 OCCULT BLOOD FECES: CPT

## 2024-10-14 DIAGNOSIS — E11.65 UNCONTROLLED TYPE 2 DIABETES MELLITUS WITH HYPERGLYCEMIA: Primary | ICD-10-CM

## 2024-12-21 ENCOUNTER — HOSPITAL ENCOUNTER (INPATIENT)
Facility: HOSPITAL | Age: 59
LOS: 3 days | Discharge: HOME | DRG: 378 | End: 2024-12-24
Attending: INTERNAL MEDICINE | Admitting: INTERNAL MEDICINE
Payer: MEDICAID

## 2024-12-21 ENCOUNTER — HOSPITAL ENCOUNTER (EMERGENCY)
Facility: HOSPITAL | Age: 59
Discharge: ANOTHER HOSPITAL | End: 2024-12-21
Attending: EMERGENCY MEDICINE
Payer: MEDICAID

## 2024-12-21 VITALS
WEIGHT: 200 LBS | DIASTOLIC BLOOD PRESSURE: 56 MMHG | BODY MASS INDEX: 29.62 KG/M2 | HEART RATE: 102 BPM | HEIGHT: 69 IN | SYSTOLIC BLOOD PRESSURE: 160 MMHG | RESPIRATION RATE: 18 BRPM | TEMPERATURE: 99 F | OXYGEN SATURATION: 98 %

## 2024-12-21 DIAGNOSIS — K92.2 UPPER GI BLEED: Primary | ICD-10-CM

## 2024-12-21 DIAGNOSIS — K31.89 NSAID-ASSOCIATED GASTROPATHY: ICD-10-CM

## 2024-12-21 DIAGNOSIS — Z86.79 HX OF CEREBRAL ANEURYSM REPAIR: ICD-10-CM

## 2024-12-21 DIAGNOSIS — K92.2 UPPER GASTROINTESTINAL BLEED: Primary | ICD-10-CM

## 2024-12-21 DIAGNOSIS — R07.9 CHEST PAIN: ICD-10-CM

## 2024-12-21 DIAGNOSIS — D64.9 ANEMIA, UNSPECIFIED TYPE: ICD-10-CM

## 2024-12-21 DIAGNOSIS — Z98.890 HX OF CEREBRAL ANEURYSM REPAIR: ICD-10-CM

## 2024-12-21 DIAGNOSIS — T39.395A NSAID-ASSOCIATED GASTROPATHY: ICD-10-CM

## 2024-12-21 DIAGNOSIS — F19.90 EXCESSIVE USE OF NONSTEROIDAL ANTI-INFLAMMATORY DRUGS (NSAIDS): ICD-10-CM

## 2024-12-21 DIAGNOSIS — R51.9 INTRACTABLE HEADACHE, UNSPECIFIED CHRONICITY PATTERN, UNSPECIFIED HEADACHE TYPE: ICD-10-CM

## 2024-12-21 LAB
ABO + RH BLD: NORMAL
ABO + RH BLD: NORMAL
ABORH RETYPE: NORMAL
ALBUMIN SERPL-MCNC: 2.9 G/DL (ref 3.4–4.8)
ALBUMIN/GLOB SERPL: 1.1 RATIO (ref 1.1–2)
ALP SERPL-CCNC: 74 UNIT/L (ref 40–150)
ALT SERPL-CCNC: 18 UNIT/L (ref 0–55)
ANION GAP SERPL CALC-SCNC: 6 MEQ/L
AST SERPL-CCNC: 14 UNIT/L (ref 5–34)
BACTERIA #/AREA URNS AUTO: NORMAL /HPF
BASOPHILS # BLD AUTO: 0.05 X10(3)/MCL
BASOPHILS NFR BLD AUTO: 0.5 %
BILIRUB SERPL-MCNC: 0.1 MG/DL
BILIRUB UR QL STRIP.AUTO: NEGATIVE
BLD PROD TYP BPU: NORMAL
BLD PROD TYP BPU: NORMAL
BLOOD UNIT EXPIRATION DATE: NORMAL
BLOOD UNIT EXPIRATION DATE: NORMAL
BLOOD UNIT TYPE CODE: 9500
BLOOD UNIT TYPE CODE: 9500
BUN SERPL-MCNC: 59.3 MG/DL (ref 8.4–25.7)
CALCIUM SERPL-MCNC: 8.2 MG/DL (ref 8.8–10)
CHLORIDE SERPL-SCNC: 107 MMOL/L (ref 98–107)
CLARITY UR: CLEAR
CO2 SERPL-SCNC: 22 MMOL/L (ref 23–31)
COLOR STL: ABNORMAL
COLOR UR AUTO: ABNORMAL
CONSISTENCY STL: ABNORMAL
CREAT SERPL-MCNC: 1.1 MG/DL (ref 0.72–1.25)
CREAT/UREA NIT SERPL: 54
CROSSMATCH INTERPRETATION: NORMAL
CROSSMATCH INTERPRETATION: NORMAL
DISPENSE STATUS: NORMAL
DISPENSE STATUS: NORMAL
EOSINOPHIL # BLD AUTO: 0.03 X10(3)/MCL (ref 0–0.9)
EOSINOPHIL NFR BLD AUTO: 0.3 %
ERYTHROCYTE [DISTWIDTH] IN BLOOD BY AUTOMATED COUNT: 13 % (ref 11.5–17)
FLUAV AG UPPER RESP QL IA.RAPID: NOT DETECTED
FLUBV AG UPPER RESP QL IA.RAPID: NOT DETECTED
GFR SERPLBLD CREATININE-BSD FMLA CKD-EPI: >60 ML/MIN/1.73/M2
GLOBULIN SER-MCNC: 2.6 GM/DL (ref 2.4–3.5)
GLUCOSE SERPL-MCNC: 180 MG/DL (ref 82–115)
GLUCOSE UR QL STRIP: 250
GROUP & RH: NORMAL
HCT VFR BLD AUTO: 25.9 % (ref 42–52)
HCT VFR BLD AUTO: 26.5 % (ref 42–52)
HEMOCCULT SP1 STL QL: POSITIVE
HGB BLD-MCNC: 8.6 G/DL (ref 14–18)
HGB BLD-MCNC: 9.3 G/DL (ref 14–18)
HGB UR QL STRIP: NEGATIVE
IMM GRANULOCYTES # BLD AUTO: 0.15 X10(3)/MCL (ref 0–0.04)
IMM GRANULOCYTES NFR BLD AUTO: 1.4 %
INDIRECT COOMBS: NORMAL
KETONES UR QL STRIP: NEGATIVE
LACTATE SERPL-SCNC: 1.8 MMOL/L (ref 0.5–2.2)
LEUKOCYTE ESTERASE UR QL STRIP: NEGATIVE
LYMPHOCYTES # BLD AUTO: 1.5 X10(3)/MCL (ref 0.6–4.6)
LYMPHOCYTES NFR BLD AUTO: 14.5 %
MAGNESIUM SERPL-MCNC: 1.7 MG/DL (ref 1.6–2.6)
MCH RBC QN AUTO: 27.7 PG (ref 27–31)
MCHC RBC AUTO-ENTMCNC: 33.2 G/DL (ref 33–36)
MCV RBC AUTO: 83.5 FL (ref 80–94)
MONOCYTES # BLD AUTO: 0.74 X10(3)/MCL (ref 0.1–1.3)
MONOCYTES NFR BLD AUTO: 7.1 %
NEUTROPHILS # BLD AUTO: 7.88 X10(3)/MCL (ref 2.1–9.2)
NEUTROPHILS NFR BLD AUTO: 76.2 %
NITRITE UR QL STRIP: NEGATIVE
PH UR STRIP: 6 [PH]
PLATELET # BLD AUTO: 238 X10(3)/MCL (ref 130–400)
PMV BLD AUTO: 9.6 FL (ref 7.4–10.4)
POTASSIUM SERPL-SCNC: 4.3 MMOL/L (ref 3.5–5.1)
PROT SERPL-MCNC: 5.5 GM/DL (ref 5.8–7.6)
PROT UR QL STRIP: NEGATIVE
RBC # BLD AUTO: 3.1 X10(6)/MCL (ref 4.7–6.1)
RBC #/AREA URNS AUTO: NORMAL /HPF
SARS-COV-2 RNA RESP QL NAA+PROBE: NOT DETECTED
SODIUM SERPL-SCNC: 135 MMOL/L (ref 136–145)
SP GR UR STRIP.AUTO: 1.01 (ref 1–1.03)
SPECIMEN OUTDATE: NORMAL
SQUAMOUS #/AREA URNS AUTO: NORMAL /HPF
UNIT NUMBER: NORMAL
UNIT NUMBER: NORMAL
UROBILINOGEN UR STRIP-ACNC: 0.2
WBC # BLD AUTO: 10.35 X10(3)/MCL (ref 4.5–11.5)
WBC #/AREA URNS AUTO: NORMAL /HPF

## 2024-12-21 PROCEDURE — 96374 THER/PROPH/DIAG INJ IV PUSH: CPT

## 2024-12-21 PROCEDURE — P9016 RBC LEUKOCYTES REDUCED: HCPCS | Performed by: EMERGENCY MEDICINE

## 2024-12-21 PROCEDURE — 83735 ASSAY OF MAGNESIUM: CPT | Performed by: EMERGENCY MEDICINE

## 2024-12-21 PROCEDURE — 96361 HYDRATE IV INFUSION ADD-ON: CPT

## 2024-12-21 PROCEDURE — 36430 TRANSFUSION BLD/BLD COMPNT: CPT

## 2024-12-21 PROCEDURE — 82272 OCCULT BLD FECES 1-3 TESTS: CPT | Performed by: EMERGENCY MEDICINE

## 2024-12-21 PROCEDURE — 25000003 PHARM REV CODE 250: Performed by: STUDENT IN AN ORGANIZED HEALTH CARE EDUCATION/TRAINING PROGRAM

## 2024-12-21 PROCEDURE — 85025 COMPLETE CBC W/AUTO DIFF WBC: CPT | Performed by: EMERGENCY MEDICINE

## 2024-12-21 PROCEDURE — 25000003 PHARM REV CODE 250: Performed by: EMERGENCY MEDICINE

## 2024-12-21 PROCEDURE — 83605 ASSAY OF LACTIC ACID: CPT | Performed by: EMERGENCY MEDICINE

## 2024-12-21 PROCEDURE — 99285 EMERGENCY DEPT VISIT HI MDM: CPT | Mod: 25

## 2024-12-21 PROCEDURE — 96375 TX/PRO/DX INJ NEW DRUG ADDON: CPT | Mod: 59

## 2024-12-21 PROCEDURE — 21400001 HC TELEMETRY ROOM

## 2024-12-21 PROCEDURE — 63600175 PHARM REV CODE 636 W HCPCS: Performed by: STUDENT IN AN ORGANIZED HEALTH CARE EDUCATION/TRAINING PROGRAM

## 2024-12-21 PROCEDURE — 63600175 PHARM REV CODE 636 W HCPCS: Performed by: EMERGENCY MEDICINE

## 2024-12-21 PROCEDURE — 36415 COLL VENOUS BLD VENIPUNCTURE: CPT | Performed by: INTERNAL MEDICINE

## 2024-12-21 PROCEDURE — 25500020 PHARM REV CODE 255: Performed by: EMERGENCY MEDICINE

## 2024-12-21 PROCEDURE — 86901 BLOOD TYPING SEROLOGIC RH(D): CPT | Performed by: EMERGENCY MEDICINE

## 2024-12-21 PROCEDURE — 25000003 PHARM REV CODE 250: Performed by: NURSE PRACTITIONER

## 2024-12-21 PROCEDURE — 0240U COVID/FLU A&B PCR: CPT | Performed by: EMERGENCY MEDICINE

## 2024-12-21 PROCEDURE — 85018 HEMOGLOBIN: CPT | Performed by: INTERNAL MEDICINE

## 2024-12-21 PROCEDURE — 81003 URINALYSIS AUTO W/O SCOPE: CPT | Performed by: EMERGENCY MEDICINE

## 2024-12-21 PROCEDURE — 80053 COMPREHEN METABOLIC PANEL: CPT | Performed by: EMERGENCY MEDICINE

## 2024-12-21 PROCEDURE — 86923 COMPATIBILITY TEST ELECTRIC: CPT | Performed by: EMERGENCY MEDICINE

## 2024-12-21 RX ORDER — SODIUM CHLORIDE 0.9 % (FLUSH) 0.9 %
10 SYRINGE (ML) INJECTION
Status: DISCONTINUED | OUTPATIENT
Start: 2024-12-21 | End: 2024-12-24 | Stop reason: HOSPADM

## 2024-12-21 RX ORDER — ACETAMINOPHEN 325 MG/1
650 TABLET ORAL EVERY 4 HOURS PRN
Status: DISCONTINUED | OUTPATIENT
Start: 2024-12-21 | End: 2024-12-24 | Stop reason: HOSPADM

## 2024-12-21 RX ORDER — HYDROCODONE BITARTRATE AND ACETAMINOPHEN 500; 5 MG/1; MG/1
TABLET ORAL
Status: DISCONTINUED | OUTPATIENT
Start: 2024-12-21 | End: 2024-12-21 | Stop reason: HOSPADM

## 2024-12-21 RX ORDER — IBUPROFEN 200 MG
16 TABLET ORAL
Status: DISCONTINUED | OUTPATIENT
Start: 2024-12-21 | End: 2024-12-24 | Stop reason: HOSPADM

## 2024-12-21 RX ORDER — ONDANSETRON HYDROCHLORIDE 2 MG/ML
4 INJECTION, SOLUTION INTRAVENOUS
Status: COMPLETED | OUTPATIENT
Start: 2024-12-21 | End: 2024-12-21

## 2024-12-21 RX ORDER — PROCHLORPERAZINE EDISYLATE 5 MG/ML
5 INJECTION INTRAMUSCULAR; INTRAVENOUS EVERY 6 HOURS PRN
Status: DISCONTINUED | OUTPATIENT
Start: 2024-12-21 | End: 2024-12-24 | Stop reason: HOSPADM

## 2024-12-21 RX ORDER — IBUPROFEN 200 MG
24 TABLET ORAL
Status: DISCONTINUED | OUTPATIENT
Start: 2024-12-21 | End: 2024-12-24 | Stop reason: HOSPADM

## 2024-12-21 RX ORDER — AMLODIPINE BESYLATE 5 MG/1
10 TABLET ORAL DAILY
Status: DISCONTINUED | OUTPATIENT
Start: 2024-12-22 | End: 2024-12-24 | Stop reason: HOSPADM

## 2024-12-21 RX ORDER — MORPHINE SULFATE 4 MG/ML
2 INJECTION, SOLUTION INTRAMUSCULAR; INTRAVENOUS EVERY 4 HOURS PRN
Status: DISCONTINUED | OUTPATIENT
Start: 2024-12-21 | End: 2024-12-21

## 2024-12-21 RX ORDER — GLUCAGON 1 MG
1 KIT INJECTION
Status: DISCONTINUED | OUTPATIENT
Start: 2024-12-21 | End: 2024-12-24 | Stop reason: HOSPADM

## 2024-12-21 RX ORDER — PANTOPRAZOLE SODIUM 40 MG/10ML
80 INJECTION, POWDER, LYOPHILIZED, FOR SOLUTION INTRAVENOUS
Status: COMPLETED | OUTPATIENT
Start: 2024-12-21 | End: 2024-12-21

## 2024-12-21 RX ORDER — ONDANSETRON HYDROCHLORIDE 2 MG/ML
4 INJECTION, SOLUTION INTRAVENOUS EVERY 4 HOURS PRN
Status: DISCONTINUED | OUTPATIENT
Start: 2024-12-21 | End: 2024-12-24 | Stop reason: HOSPADM

## 2024-12-21 RX ORDER — BUTALBITAL, ACETAMINOPHEN AND CAFFEINE 50; 325; 40 MG/1; MG/1; MG/1
1 TABLET ORAL EVERY 4 HOURS PRN
Status: DISCONTINUED | OUTPATIENT
Start: 2024-12-21 | End: 2024-12-24 | Stop reason: HOSPADM

## 2024-12-21 RX ORDER — NALOXONE HCL 0.4 MG/ML
0.02 VIAL (ML) INJECTION
Status: DISCONTINUED | OUTPATIENT
Start: 2024-12-21 | End: 2024-12-24 | Stop reason: HOSPADM

## 2024-12-21 RX ORDER — PROCHLORPERAZINE EDISYLATE 5 MG/ML
10 INJECTION INTRAMUSCULAR; INTRAVENOUS
Status: COMPLETED | OUTPATIENT
Start: 2024-12-21 | End: 2024-12-21

## 2024-12-21 RX ORDER — PANTOPRAZOLE SODIUM 40 MG/10ML
40 INJECTION, POWDER, LYOPHILIZED, FOR SOLUTION INTRAVENOUS 2 TIMES DAILY
Status: DISCONTINUED | OUTPATIENT
Start: 2024-12-21 | End: 2024-12-24 | Stop reason: HOSPADM

## 2024-12-21 RX ORDER — HYDROMORPHONE HYDROCHLORIDE 2 MG/ML
1 INJECTION, SOLUTION INTRAMUSCULAR; INTRAVENOUS; SUBCUTANEOUS
Status: COMPLETED | OUTPATIENT
Start: 2024-12-21 | End: 2024-12-21

## 2024-12-21 RX ORDER — ACETAMINOPHEN 500 MG
1000 TABLET ORAL EVERY 6 HOURS PRN
Status: DISCONTINUED | OUTPATIENT
Start: 2024-12-21 | End: 2024-12-24 | Stop reason: HOSPADM

## 2024-12-21 RX ADMIN — BUTALBITAL, ACETAMINOPHEN, AND CAFFEINE 1 TABLET: 325; 50; 40 TABLET ORAL at 08:12

## 2024-12-21 RX ADMIN — HYDROMORPHONE HYDROCHLORIDE 1 MG: 2 INJECTION INTRAMUSCULAR; INTRAVENOUS; SUBCUTANEOUS at 02:12

## 2024-12-21 RX ADMIN — IOHEXOL 100 ML: 350 INJECTION, SOLUTION INTRAVENOUS at 02:12

## 2024-12-21 RX ADMIN — MORPHINE SULFATE 2 MG: 4 INJECTION INTRAVENOUS at 04:12

## 2024-12-21 RX ADMIN — PANTOPRAZOLE SODIUM 40 MG: 40 INJECTION, POWDER, LYOPHILIZED, FOR SOLUTION INTRAVENOUS at 08:12

## 2024-12-21 RX ADMIN — ACETAMINOPHEN 1000 MG: 500 TABLET ORAL at 10:12

## 2024-12-21 RX ADMIN — ONDANSETRON 4 MG: 2 INJECTION INTRAMUSCULAR; INTRAVENOUS at 02:12

## 2024-12-21 RX ADMIN — MORPHINE SULFATE 2 MG: 4 INJECTION INTRAVENOUS at 12:12

## 2024-12-21 RX ADMIN — PANTOPRAZOLE SODIUM 80 MG: 40 INJECTION, POWDER, LYOPHILIZED, FOR SOLUTION INTRAVENOUS at 02:12

## 2024-12-21 RX ADMIN — SODIUM CHLORIDE 1000 ML: 9 INJECTION, SOLUTION INTRAVENOUS at 01:12

## 2024-12-21 RX ADMIN — PROCHLORPERAZINE EDISYLATE 10 MG: 5 INJECTION INTRAMUSCULAR; INTRAVENOUS at 03:12

## 2024-12-21 NOTE — PLAN OF CARE
Problem: Adult Inpatient Plan of Care  Goal: Plan of Care Review  Outcome: Progressing  Flowsheets (Taken 12/21/2024 1310)  Plan of Care Reviewed With: patient  Goal: Patient-Specific Goal (Individualized)  Outcome: Progressing  Goal: Absence of Hospital-Acquired Illness or Injury  Outcome: Progressing  Goal: Optimal Comfort and Wellbeing  Outcome: Progressing  Goal: Readiness for Transition of Care  Outcome: Progressing

## 2024-12-21 NOTE — ED PROVIDER NOTES
Encounter Date: 12/21/2024       History     Chief Complaint   Patient presents with    Headache     Headache and diarrhea x2 days. Weakness with ambulation, dark urine. 150mg fent IV for pain from EMS. +orthostatic on scene.      Patient is a 59-year-old male with a history of cerebral aneurysm who presents to the emergency room with a chief complaint of 2 days of diarrhea, dark stools an intractable headache.  He has a history of chronic headache pain and this pain is typical of his previous headaches.  The diarrhea is new and has been dark in color.  He says that he can not control his bowel movements.  She does complain of epigastric discomfort.  He states that he takes BC powder for his headaches and on occasion takes as many as 5-8 per day.  He has been complaining of dizziness and trouble with ambulation due to weakness.  He was orthostatic on scene with the EMS.        Review of patient's allergies indicates:  No Known Allergies  Past Medical History:   Diagnosis Date    Chronic pain disorder     Depression     Hypertension     Stroke      Past Surgical History:   Procedure Laterality Date    COLONOSCOPY N/A 8/24/2023    Procedure: COLONOSCOPY;  Surgeon: Nadir Gregg MD;  Location: [facility] ENDO;  Service: Endoscopy;  Laterality: N/A;    ROBOT-ASSISTED REPAIR OF UMBILICAL HERNIA USING DA NEIL XI N/A 8/28/2023    Procedure: XI ROBOTIC REPAIR, HERNIA, UMBILICAL;  Surgeon: Nadir Gregg MD;  Location: [facility] OR;  Service: General;  Laterality: N/A;  with mesh     Family History   Problem Relation Name Age of Onset    Coronary artery disease Mother      Coronary artery disease Father       Social History     Tobacco Use    Smoking status: Former     Types: Cigarettes    Smokeless tobacco: Former   Substance Use Topics    Alcohol use: Yes     Comment: Occasionally    Drug use: Not Currently     Review of Systems   Constitutional:  Positive for fatigue. Negative for chills and fever.   HENT: Negative.   Negative for congestion, sore throat and trouble swallowing.    Eyes:  Negative for discharge and visual disturbance.   Respiratory:  Negative for cough and shortness of breath.    Cardiovascular:  Negative for chest pain and palpitations.   Gastrointestinal:  Positive for abdominal pain and diarrhea. Negative for vomiting.   Endocrine: Negative.    Genitourinary:  Negative for flank pain and hematuria.   Musculoskeletal:  Negative for myalgias.   Skin:  Negative for rash.   Neurological:  Positive for dizziness, weakness and headaches.   Psychiatric/Behavioral:  Negative for hallucinations and suicidal ideas.    All other systems reviewed and are negative.      Physical Exam     Initial Vitals [12/21/24 0108]   BP Pulse Resp Temp SpO2   (!) 150/98 108 18 97.8 °F (36.6 °C) 97 %      MAP       --         Physical Exam    Nursing note and vitals reviewed.  Constitutional: He appears well-developed and well-nourished. No distress.   HENT:   Head: Normocephalic and atraumatic.   Eyes: EOM are normal. Pupils are equal, round, and reactive to light.   Neck: Trachea normal. Neck supple.    Full passive range of motion without pain.     Cardiovascular:  Regular rhythm and normal pulses.   Tachycardia present.         Abdominal: There is abdominal tenderness in the epigastric area.   Genitourinary: Rectum:      Guaiac result positive.      Mass: Melanotic stool.   Guaiac positive stool. : Acceptable.   Genitourinary Comments: Black stool     Musculoskeletal:      Cervical back: Full passive range of motion without pain and neck supple.      Comments: No deformity, Nl ROM     Lymphadenopathy:     He has no cervical adenopathy.   Neurological: He is alert and oriented to person, place, and time. He has normal strength. GCS eye subscore is 4. GCS verbal subscore is 5. GCS motor subscore is 6.   Skin: Skin is warm and intact. Capillary refill takes less than 2 seconds.   Psychiatric: He is not actively  hallucinating. He expresses no homicidal and no suicidal ideation.         ED Course   Procedures  Labs Reviewed   COMPREHENSIVE METABOLIC PANEL - Abnormal       Result Value    Sodium 135 (*)     Potassium 4.3      Chloride 107      CO2 22 (*)     Glucose 180 (*)     Blood Urea Nitrogen 59.3 (*)     Creatinine 1.10      Calcium 8.2 (*)     Protein Total 5.5 (*)     Albumin 2.9 (*)     Globulin 2.6      Albumin/Globulin Ratio 1.1      Bilirubin Total 0.1      ALP 74      ALT 18      AST 14      eGFR >60      Anion Gap 6.0      BUN/Creatinine Ratio 54     URINALYSIS - Abnormal    Color, UA Straw      Appearance, UA Clear      Specific Gravity, UA 1.010      pH, UA 6.0      Protein, UA Negative      Glucose,  (*)     Ketones, UA Negative      Blood, UA Negative      Bilirubin, UA Negative      Urobilinogen, UA 0.2      Nitrites, UA Negative      Leukocyte Esterase, UA Negative     OCCULT BLOOD, STOOL 1ST SPECIMEN - Abnormal    Stool Color 1 Brown      Stool Consistancy 1 formed      Occult Blood Stool 1 Positive (*)    CBC WITH DIFFERENTIAL - Abnormal    WBC 10.35      RBC 3.10 (*)     Hgb 8.6 (*)     Hct 25.9 (*)     MCV 83.5      MCH 27.7      MCHC 33.2      RDW 13.0      Platelet 238      MPV 9.6      Neut % 76.2      Lymph % 14.5      Mono % 7.1      Eos % 0.3      Basophil % 0.5      Lymph # 1.50      Neut # 7.88      Mono # 0.74      Eos # 0.03      Baso # 0.05      IG# 0.15 (*)     IG% 1.4     LACTIC ACID, PLASMA - Normal    Lactic Acid Level 1.8     MAGNESIUM - Normal    Magnesium Level 1.70     COVID/FLU A&B PCR - Normal    Influenza A PCR Not Detected      Influenza B PCR Not Detected      SARS-CoV-2 PCR Not Detected      Narrative:     The Xpert Xpress SARS-CoV-2/FLU/RSV plus is a rapid, multiplexed real-time PCR test intended for the simultaneous qualitative detection and differentiation of SARS-CoV-2, Influenza A, Influenza B, and respiratory syncytial virus (RSV) viral RNA in either nasopharyngeal  swab or nasal swab specimens.         URINALYSIS, MICROSCOPIC - Normal    Bacteria, UA None Seen      RBC, UA None Seen      WBC, UA None Seen      Squamous Epithelial Cells, UA None Seen     CLOSTRIDIUM DIFFICILE TOXIN A AND B, EIA   CBC W/ AUTO DIFFERENTIAL    Narrative:     The following orders were created for panel order CBC auto differential.  Procedure                               Abnormality         Status                     ---------                               -----------         ------                     CBC with Differential[[id number]]       Abnormal            Final result                 Please view results for these tests on the individual orders.   ROTAVIRUS ANTIGEN, STOOL   TYPE & SCREEN    Group & Rh O NEG      Indirect Pardeep GEL NEG      Specimen Outdate 12/24/2024 23:59     ABORH RETYPE    ABORH Retype O NEG     PREPARE RBC SOFT    UNIT NUMBER [id number]      UNIT ABO/RH O NEG      DISPENSE STATUS Issued      Unit Expiration [id number]      Product Code W0347B49      Unit Blood Type Code 9500      CROSSMATCH INTERPRETATION Compatible      UNIT NUMBER [id number]      UNIT ABO/RH O NEG      DISPENSE STATUS Selected      Unit Expiration [id number]      Product Code B4942I15      Unit Blood Type Code 9500      CROSSMATCH INTERPRETATION Compatible            Imaging Results              CTA Brain (Preliminary result)  Result time 12/21/24 03:39:58      Preliminary result by Ashkan Patel MD (12/21/24 03:39:58)                   Narrative:    START OF REPORT:  Technique: CT angiogram of the intracranial vessels was performed with intravenous contrast with direct axial as well as.    Comparison: Comparison is with study dated.    Clinical history: Headache (Headache and diarrhea x2 days. Weakness with ambulation, dark urine. 150mg fent IV for pain from EMS. +orthostatic on scene. ).    Findings:  Intracranial Vascular structures:  Internal carotid arteries: Mild atheromatous  calcification of the cavernous clinoid segment of the bilateral internal carotid artery is seen without significant stenosis.  Middle cerebral arteries: Unremarkable.  Anterior cerebral arteries: Unremarkable.  Vertebral arteries: Unremarkable.  Basilar artery: Unremarkable.  Posterior cerebral arteries: Unremarkable.  Posterior communicating arteries: Bilateral posterior communicating artery visualized.  Jugular Veins and venous sinuses: Unremarkable.  Brain parenchyma: No abnormal intracranial enhancement is seen on the post contrast images.      Impression:  1. Unremarkable CT angiogram of the head. Details and other findings as described above.                                         X-Ray Chest AP Portable (In process)                      Medications   0.9%  NaCl infusion (for blood administration) (has no administration in time range)   sodium chloride 0.9% bolus 1,000 mL 1,000 mL (0 mLs Intravenous Stopped 12/21/24 0232)   pantoprazole injection 80 mg (80 mg Intravenous Given 12/21/24 0228)   iohexoL (OMNIPAQUE 350) injection 100 mL (100 mLs Intravenous Given 12/21/24 0242)   HYDROmorphone (PF) injection 1 mg (1 mg Intravenous Given 12/21/24 0228)   ondansetron injection 4 mg (4 mg Intravenous Given 12/21/24 0228)   prochlorperazine injection Soln 10 mg (10 mg Intravenous Given 12/21/24 0328)     Medical Decision Making  CBC was obtained to evaluate for anemia and for evidence of infection that could be seen with a white blood cell count elevation.    A CMP was ordered to evaluate the renal function for evidence of acute kidney injury based on an elevated creatinine, uremia based on an elevated BUN or suggestion of dehydration.  We will also be able to screen for liver disease by assessing the AST/ALT levels.  Biliary obstruction can be assessed by reviewing the alkaline phosphatase and bilirubin levels.  Electrolyte abnormalities will also be ruled out with levels of the potassium, sodium and magnesium  levels being evaluated.  A glucose reading will rule out hypo or hyperglycemia.  Will calculate the anion gap and assess for metabolic acidosis.    A urinalysis will be done to rule out UTI.  Hematuria on the urinalysis may suggest a kidney stone or other pathology.  We will also assess for proteinuria on the urinalysis.    CT angio scan of the head was obtained to rule out intracranial processes including a mass lesion, intracranial hemorrhage, ischemic changes, parenchymal contusion and possibly bony skull abnormalities.    Amount and/or Complexity of Data Reviewed  Labs: ordered.  Radiology: ordered.    Risk  Prescription drug management.               ED Course as of 12/21/24 0629   Sat Dec 21, 2024   0240 Patient is a experienced an acute upper GI bleed.  His hemoglobin has fallen significantly since September and is now currently at 8.6.  His BUN elevation of 59 signifies significant burden of blood within the bowel.  He is persistently tachycardic and dizzy with standing.  I will order type and cross match for 2 units.  He may require blood soon.  He likely has NSAID related gastropathy and peptic ulcer disease [DB]   0551 This patient's care will be transferred to the incoming physician. We have discussed: the patient's chief complaint; any labs and imaging that have been completed and those that are still pending; procedures that have been completed (if any) and those remaining to be done; any treatment provided and the patient's response to treatment; input from consultants (if any); the remaining treatment plan. The incoming physician will follow up on all pending labs and imaging, make any necessary changes to the current impression and/or treatment plan and provide a final disposition. [DB]      ED Course User Index  [DB] Jose Taylor MD                           Clinical Impression:  Final diagnoses:  [K92.2] Upper GI bleed (Primary)  [D64.9] Anemia, unspecified type  [R51.9] Intractable headache,  unspecified chronicity pattern, unspecified headache type  [Z98.890, Z86.79] Hx of cerebral aneurysm repair  [K31.89, T39.395A] NSAID-associated gastropathy          ED Disposition Condition    Transfer to Another Facility Stable                Savage Chaney MD  12/21/24 9581

## 2024-12-21 NOTE — SIGNIFICANT EVENT
"Asked patient about home meds. States that he "ran out" almost a month ago and has not been taking anything.  "

## 2024-12-22 ENCOUNTER — ANESTHESIA (OUTPATIENT)
Dept: SURGERY | Facility: HOSPITAL | Age: 59
DRG: 378 | End: 2024-12-22
Payer: MEDICAID

## 2024-12-22 ENCOUNTER — ANESTHESIA EVENT (OUTPATIENT)
Dept: SURGERY | Facility: HOSPITAL | Age: 59
DRG: 378 | End: 2024-12-22
Payer: MEDICAID

## 2024-12-22 LAB
ABO + RH BLD: NORMAL
ALBUMIN SERPL-MCNC: 3 G/DL (ref 3.4–4.8)
ALBUMIN/GLOB SERPL: 1.5 RATIO (ref 1.1–2)
ALP SERPL-CCNC: 75 UNIT/L (ref 40–150)
ALT SERPL-CCNC: 19 UNIT/L (ref 0–55)
ANION GAP SERPL CALC-SCNC: 8 MEQ/L
AST SERPL-CCNC: 19 UNIT/L (ref 5–34)
BASOPHILS # BLD AUTO: 0.03 X10(3)/MCL
BASOPHILS NFR BLD AUTO: 0.5 %
BILIRUB SERPL-MCNC: 0.2 MG/DL
BLD PROD TYP BPU: NORMAL
BLOOD UNIT EXPIRATION DATE: NORMAL
BLOOD UNIT TYPE CODE: 9500
BUN SERPL-MCNC: 25.7 MG/DL (ref 8.4–25.7)
CALCIUM SERPL-MCNC: 8.3 MG/DL (ref 8.8–10)
CHLORIDE SERPL-SCNC: 112 MMOL/L (ref 98–107)
CO2 SERPL-SCNC: 21 MMOL/L (ref 23–31)
CREAT SERPL-MCNC: 1.17 MG/DL (ref 0.72–1.25)
CREAT/UREA NIT SERPL: 22
CROSSMATCH INTERPRETATION: NORMAL
DISPENSE STATUS: NORMAL
EOSINOPHIL # BLD AUTO: 0.13 X10(3)/MCL (ref 0–0.9)
EOSINOPHIL NFR BLD AUTO: 2.2 %
ERYTHROCYTE [DISTWIDTH] IN BLOOD BY AUTOMATED COUNT: 14.9 % (ref 11.5–17)
GFR SERPLBLD CREATININE-BSD FMLA CKD-EPI: >60 ML/MIN/1.73/M2
GLOBULIN SER-MCNC: 2 GM/DL (ref 2.4–3.5)
GLUCOSE SERPL-MCNC: 128 MG/DL (ref 82–115)
GROUP & RH: NORMAL
HCT VFR BLD AUTO: 21.1 % (ref 42–52)
HGB BLD-MCNC: 7.3 G/DL (ref 14–18)
IMM GRANULOCYTES # BLD AUTO: 0.09 X10(3)/MCL (ref 0–0.04)
IMM GRANULOCYTES NFR BLD AUTO: 1.5 %
INDIRECT COOMBS: NORMAL
LYMPHOCYTES # BLD AUTO: 1.82 X10(3)/MCL (ref 0.6–4.6)
LYMPHOCYTES NFR BLD AUTO: 31.3 %
MCH RBC QN AUTO: 28.5 PG (ref 27–31)
MCHC RBC AUTO-ENTMCNC: 34.6 G/DL (ref 33–36)
MCV RBC AUTO: 82.4 FL (ref 80–94)
MONOCYTES # BLD AUTO: 0.46 X10(3)/MCL (ref 0.1–1.3)
MONOCYTES NFR BLD AUTO: 7.9 %
NEUTROPHILS # BLD AUTO: 3.29 X10(3)/MCL (ref 2.1–9.2)
NEUTROPHILS NFR BLD AUTO: 56.6 %
NRBC BLD AUTO-RTO: 0 %
OHS QRS DURATION: 84 MS
OHS QRS DURATION: 94 MS
OHS QTC CALCULATION: 467 MS
OHS QTC CALCULATION: 471 MS
PLATELET # BLD AUTO: 171 X10(3)/MCL (ref 130–400)
PMV BLD AUTO: 9.5 FL (ref 7.4–10.4)
POTASSIUM SERPL-SCNC: 3.8 MMOL/L (ref 3.5–5.1)
PROT SERPL-MCNC: 5 GM/DL (ref 5.8–7.6)
RBC # BLD AUTO: 2.56 X10(6)/MCL (ref 4.7–6.1)
SODIUM SERPL-SCNC: 141 MMOL/L (ref 136–145)
SPECIMEN OUTDATE: NORMAL
UNIT NUMBER: NORMAL
WBC # BLD AUTO: 5.82 X10(3)/MCL (ref 4.5–11.5)

## 2024-12-22 PROCEDURE — 27201423 OPTIME MED/SURG SUP & DEVICES STERILE SUPPLY: Performed by: INTERNAL MEDICINE

## 2024-12-22 PROCEDURE — 25000003 PHARM REV CODE 250: Performed by: NURSE PRACTITIONER

## 2024-12-22 PROCEDURE — 0W3P8ZZ CONTROL BLEEDING IN GASTROINTESTINAL TRACT, VIA NATURAL OR ARTIFICIAL OPENING ENDOSCOPIC: ICD-10-PCS | Performed by: INTERNAL MEDICINE

## 2024-12-22 PROCEDURE — 93010 ELECTROCARDIOGRAM REPORT: CPT | Mod: ,,, | Performed by: INTERNAL MEDICINE

## 2024-12-22 PROCEDURE — 80053 COMPREHEN METABOLIC PANEL: CPT | Performed by: NURSE PRACTITIONER

## 2024-12-22 PROCEDURE — 63600175 PHARM REV CODE 636 W HCPCS: Performed by: NURSE PRACTITIONER

## 2024-12-22 PROCEDURE — 25000003 PHARM REV CODE 250: Performed by: STUDENT IN AN ORGANIZED HEALTH CARE EDUCATION/TRAINING PROGRAM

## 2024-12-22 PROCEDURE — 86900 BLOOD TYPING SEROLOGIC ABO: CPT | Performed by: STUDENT IN AN ORGANIZED HEALTH CARE EDUCATION/TRAINING PROGRAM

## 2024-12-22 PROCEDURE — 86923 COMPATIBILITY TEST ELECTRIC: CPT | Performed by: STUDENT IN AN ORGANIZED HEALTH CARE EDUCATION/TRAINING PROGRAM

## 2024-12-22 PROCEDURE — 36415 COLL VENOUS BLD VENIPUNCTURE: CPT | Performed by: NURSE PRACTITIONER

## 2024-12-22 PROCEDURE — 37000008 HC ANESTHESIA 1ST 15 MINUTES: Performed by: INTERNAL MEDICINE

## 2024-12-22 PROCEDURE — 93005 ELECTROCARDIOGRAM TRACING: CPT

## 2024-12-22 PROCEDURE — 36415 COLL VENOUS BLD VENIPUNCTURE: CPT | Performed by: STUDENT IN AN ORGANIZED HEALTH CARE EDUCATION/TRAINING PROGRAM

## 2024-12-22 PROCEDURE — 37000009 HC ANESTHESIA EA ADD 15 MINS: Performed by: INTERNAL MEDICINE

## 2024-12-22 PROCEDURE — 63600175 PHARM REV CODE 636 W HCPCS: Performed by: STUDENT IN AN ORGANIZED HEALTH CARE EDUCATION/TRAINING PROGRAM

## 2024-12-22 PROCEDURE — 43255 EGD CONTROL BLEEDING ANY: CPT | Performed by: INTERNAL MEDICINE

## 2024-12-22 PROCEDURE — 25000003 PHARM REV CODE 250

## 2024-12-22 PROCEDURE — 21400001 HC TELEMETRY ROOM

## 2024-12-22 PROCEDURE — 85025 COMPLETE CBC W/AUTO DIFF WBC: CPT | Performed by: NURSE PRACTITIONER

## 2024-12-22 PROCEDURE — 30233N1 TRANSFUSION OF NONAUTOLOGOUS RED BLOOD CELLS INTO PERIPHERAL VEIN, PERCUTANEOUS APPROACH: ICD-10-PCS | Performed by: STUDENT IN AN ORGANIZED HEALTH CARE EDUCATION/TRAINING PROGRAM

## 2024-12-22 PROCEDURE — P9016 RBC LEUKOCYTES REDUCED: HCPCS | Performed by: STUDENT IN AN ORGANIZED HEALTH CARE EDUCATION/TRAINING PROGRAM

## 2024-12-22 PROCEDURE — 63600175 PHARM REV CODE 636 W HCPCS

## 2024-12-22 RX ORDER — PROPOFOL 10 MG/ML
VIAL (ML) INTRAVENOUS
Status: DISCONTINUED | OUTPATIENT
Start: 2024-12-22 | End: 2024-12-22

## 2024-12-22 RX ORDER — DIPHENHYDRAMINE HYDROCHLORIDE 50 MG/ML
25 INJECTION INTRAMUSCULAR; INTRAVENOUS ONCE
Status: COMPLETED | OUTPATIENT
Start: 2024-12-22 | End: 2024-12-22

## 2024-12-22 RX ORDER — DIPHENHYDRAMINE HCL 25 MG
25 CAPSULE ORAL EVERY 6 HOURS PRN
Status: DISCONTINUED | OUTPATIENT
Start: 2024-12-22 | End: 2024-12-22

## 2024-12-22 RX ORDER — METOCLOPRAMIDE HYDROCHLORIDE 5 MG/ML
10 INJECTION INTRAMUSCULAR; INTRAVENOUS EVERY 6 HOURS PRN
Status: DISCONTINUED | OUTPATIENT
Start: 2024-12-22 | End: 2024-12-24 | Stop reason: HOSPADM

## 2024-12-22 RX ORDER — DIPHENHYDRAMINE HYDROCHLORIDE 50 MG/ML
50 INJECTION INTRAMUSCULAR; INTRAVENOUS ONCE
Status: COMPLETED | OUTPATIENT
Start: 2024-12-22 | End: 2024-12-22

## 2024-12-22 RX ORDER — METOCLOPRAMIDE HYDROCHLORIDE 5 MG/ML
10 INJECTION INTRAMUSCULAR; INTRAVENOUS ONCE
Status: COMPLETED | OUTPATIENT
Start: 2024-12-22 | End: 2024-12-22

## 2024-12-22 RX ORDER — HYDRALAZINE HYDROCHLORIDE 20 MG/ML
10 INJECTION INTRAMUSCULAR; INTRAVENOUS EVERY 4 HOURS PRN
Status: DISCONTINUED | OUTPATIENT
Start: 2024-12-22 | End: 2024-12-22

## 2024-12-22 RX ORDER — PROCHLORPERAZINE EDISYLATE 5 MG/ML
10 INJECTION INTRAMUSCULAR; INTRAVENOUS ONCE
Status: COMPLETED | OUTPATIENT
Start: 2024-12-22 | End: 2024-12-22

## 2024-12-22 RX ORDER — SUCRALFATE 1 G/1
1 TABLET ORAL
Status: DISCONTINUED | OUTPATIENT
Start: 2024-12-22 | End: 2024-12-24 | Stop reason: HOSPADM

## 2024-12-22 RX ORDER — DIPHENHYDRAMINE HYDROCHLORIDE 50 MG/ML
50 INJECTION INTRAMUSCULAR; INTRAVENOUS EVERY 6 HOURS PRN
Status: DISCONTINUED | OUTPATIENT
Start: 2024-12-22 | End: 2024-12-24 | Stop reason: HOSPADM

## 2024-12-22 RX ORDER — LISINOPRIL 10 MG/1
10 TABLET ORAL DAILY
Status: DISCONTINUED | OUTPATIENT
Start: 2024-12-22 | End: 2024-12-24 | Stop reason: HOSPADM

## 2024-12-22 RX ORDER — LIDOCAINE HYDROCHLORIDE 20 MG/ML
INJECTION, SOLUTION EPIDURAL; INFILTRATION; INTRACAUDAL; PERINEURAL
Status: DISCONTINUED | OUTPATIENT
Start: 2024-12-22 | End: 2024-12-22

## 2024-12-22 RX ORDER — LABETALOL HYDROCHLORIDE 5 MG/ML
10 INJECTION, SOLUTION INTRAVENOUS
Status: DISCONTINUED | OUTPATIENT
Start: 2024-12-22 | End: 2024-12-24 | Stop reason: HOSPADM

## 2024-12-22 RX ORDER — HYDROCODONE BITARTRATE AND ACETAMINOPHEN 500; 5 MG/1; MG/1
TABLET ORAL
Status: DISCONTINUED | OUTPATIENT
Start: 2024-12-22 | End: 2024-12-24 | Stop reason: HOSPADM

## 2024-12-22 RX ADMIN — SUCRALFATE 1 G: 1 TABLET ORAL at 08:12

## 2024-12-22 RX ADMIN — BUTALBITAL, ACETAMINOPHEN, AND CAFFEINE 1 TABLET: 325; 50; 40 TABLET ORAL at 01:12

## 2024-12-22 RX ADMIN — BUTALBITAL, ACETAMINOPHEN, AND CAFFEINE 1 TABLET: 325; 50; 40 TABLET ORAL at 10:12

## 2024-12-22 RX ADMIN — PROCHLORPERAZINE EDISYLATE 10 MG: 5 INJECTION INTRAMUSCULAR; INTRAVENOUS at 12:12

## 2024-12-22 RX ADMIN — PROPOFOL 30 MG: 10 INJECTION, EMULSION INTRAVENOUS at 11:12

## 2024-12-22 RX ADMIN — PROPOFOL 80 MG: 10 INJECTION, EMULSION INTRAVENOUS at 11:12

## 2024-12-22 RX ADMIN — SUCRALFATE 1 G: 1 TABLET ORAL at 05:12

## 2024-12-22 RX ADMIN — HYDRALAZINE HYDROCHLORIDE 10 MG: 20 INJECTION INTRAMUSCULAR; INTRAVENOUS at 04:12

## 2024-12-22 RX ADMIN — DIPHENHYDRAMINE HYDROCHLORIDE 25 MG: 25 CAPSULE ORAL at 05:12

## 2024-12-22 RX ADMIN — SODIUM CHLORIDE: 9 INJECTION, SOLUTION INTRAVENOUS at 11:12

## 2024-12-22 RX ADMIN — BUTALBITAL, ACETAMINOPHEN, AND CAFFEINE 1 TABLET: 325; 50; 40 TABLET ORAL at 05:12

## 2024-12-22 RX ADMIN — BUTALBITAL, ACETAMINOPHEN, AND CAFFEINE 1 TABLET: 325; 50; 40 TABLET ORAL at 12:12

## 2024-12-22 RX ADMIN — PROPOFOL 125 MCG/KG/MIN: 10 INJECTION, EMULSION INTRAVENOUS at 11:12

## 2024-12-22 RX ADMIN — LABETALOL HYDROCHLORIDE 10 MG: 5 INJECTION, SOLUTION INTRAVENOUS at 06:12

## 2024-12-22 RX ADMIN — LIDOCAINE HYDROCHLORIDE 40 MG: 20 INJECTION, SOLUTION INTRAVENOUS at 11:12

## 2024-12-22 RX ADMIN — SODIUM CHLORIDE 1000 ML: 9 INJECTION, SOLUTION INTRAVENOUS at 12:12

## 2024-12-22 RX ADMIN — LISINOPRIL 10 MG: 10 TABLET ORAL at 09:12

## 2024-12-22 RX ADMIN — DIPHENHYDRAMINE HYDROCHLORIDE 50 MG: 50 INJECTION INTRAMUSCULAR; INTRAVENOUS at 12:12

## 2024-12-22 RX ADMIN — DIPHENHYDRAMINE HYDROCHLORIDE 25 MG: 50 INJECTION INTRAMUSCULAR; INTRAVENOUS at 09:12

## 2024-12-22 RX ADMIN — PANTOPRAZOLE SODIUM 40 MG: 40 INJECTION, POWDER, LYOPHILIZED, FOR SOLUTION INTRAVENOUS at 09:12

## 2024-12-22 RX ADMIN — METOCLOPRAMIDE 10 MG: 5 INJECTION, SOLUTION INTRAMUSCULAR; INTRAVENOUS at 02:12

## 2024-12-22 RX ADMIN — PROCHLORPERAZINE EDISYLATE 10 MG: 5 INJECTION INTRAMUSCULAR; INTRAVENOUS at 09:12

## 2024-12-22 RX ADMIN — BUTALBITAL, ACETAMINOPHEN, AND CAFFEINE 1 TABLET: 325; 50; 40 TABLET ORAL at 04:12

## 2024-12-22 RX ADMIN — DIPHENHYDRAMINE HYDROCHLORIDE 50 MG: 50 INJECTION INTRAMUSCULAR; INTRAVENOUS at 09:12

## 2024-12-22 RX ADMIN — AMLODIPINE BESYLATE 10 MG: 5 TABLET ORAL at 09:12

## 2024-12-22 RX ADMIN — PROPOFOL 40 MG: 10 INJECTION, EMULSION INTRAVENOUS at 11:12

## 2024-12-22 RX ADMIN — LABETALOL HYDROCHLORIDE 10 MG: 5 INJECTION, SOLUTION INTRAVENOUS at 12:12

## 2024-12-22 RX ADMIN — DIPHENHYDRAMINE HYDROCHLORIDE 50 MG: 50 INJECTION INTRAMUSCULAR; INTRAVENOUS at 02:12

## 2024-12-22 NOTE — TRANSFER OF CARE
"Anesthesia Transfer of Care Note    Patient: Sarabjit Ontiveros    Procedure(s) Performed: Procedure(s) (LRB):  EGD (ESOPHAGOGASTRODUODENOSCOPY) (N/A)    Patient location: GI    Anesthesia Type: general    Transport from OR: Transported from OR on room air with adequate spontaneous ventilation    Post pain: adequate analgesia    Post assessment: no apparent anesthetic complications and tolerated procedure well    Post vital signs: stable    Level of consciousness: responds to stimulation    Nausea/Vomiting: no nausea/vomiting    Complications: none    Transfer of care protocol was followed      Last vitals: Visit Vitals  BP (!) 145/82 (BP Location: Left arm, Patient Position: Lying)   Pulse 94   Temp 37 °C (98.6 °F) (Temporal)   Resp (!) 22   Ht 5' 9" (1.753 m)   Wt 91.2 kg (201 lb)   SpO2 97%   BMI 29.68 kg/m²     "

## 2024-12-22 NOTE — ANESTHESIA PREPROCEDURE EVALUATION
12/22/2024  Sarabjit Ontiveros is a 59 y.o., male with cerebral aneurysms/CVA admitted December 21st complaining of melena and headache.  Further workup revealed moderate anemia.  Patient tolerated general anesthesia in 2023 for robotic umbilical hernia repair (see below).  Patient presents today for EGD with transfusion initiated.  CTA of head at outside facility negative      Exercise/nucleolar stress February 2023   6.7 Mets of activity  EKG negative for ischemia  No evidence of ischemia on perfusion study   EF 57%    Transthoracic echo January 2023   EF 60% with no wall motion abnormalities   Mild-to-moderate LVH   Normal diastolic function   Trivial AR      Pre-op Assessment    I have reviewed the Patient Summary Reports.    I have reviewed the NPO Status.   I have reviewed the Medications.     Review of Systems  Anesthesia Hx:               Denies Personal Hx of Anesthesia complications.                    Social:  Non-Smoker       Hematology/Oncology:       -- Anemia:                                  Cardiovascular:     Hypertension                  Functional Capacity 6-7 METS                         Neurological:   CVA   Headaches                                     Physical Exam  General: Well nourished, Cooperative, Alert and Oriented    Airway:  Mallampati: III   Mouth Opening: Normal  TM Distance: Normal  Tongue: Normal  Neck ROM: Normal ROM  Full beard  Dental:  Intact    Chest/Lungs:  Clear to auscultation, Normal Respiratory Rate    Heart:  Rate: Normal  Rhythm: Regular Rhythm        Anesthesia Plan  Type of Anesthesia, risks & benefits discussed:    Anesthesia Type: Gen Natural Airway  Intra-op Monitoring Plan: Standard ASA Monitors  Induction:  IV  Informed Consent: Informed consent signed with the Patient and all parties understand the risks and agree with anesthesia plan.  All questions  answered. Patient consented to blood products? Yes  ASA Score: 3  Day of Surgery Review of History & Physical: H&P Update referred to the surgeon/provider.    Ready For Surgery From Anesthesia Perspective.     .

## 2024-12-22 NOTE — PROCEDURES
Sarabjit Ontiveros   MRN: [id number]   ADMISSION DATE: 2024  : 1965  AGE: 59 y.o.    DATE OF PROCEDURE:  2024     PROCEDURE:  EGD with cautery/Endoclipping    SURGEON: SENTHIL COLON    PREOPERATIVE DIAGNOSIS:  Acute blood loss anemia, history of dark stool    POSTOPERATIVE DIAGNOSIS:  1. Probable Ly-Brown tear with visible vessel, GE junction.  Cautery/Endoclip x1 was done.  2. Duodenal bulb polypoid lesion, smooth surface, approximately 1 cm.  Not biopsied.  3. Otherwise normal exam.      HISTORY AND PHYSICAL:  As per preoperative note.      DESCRIPTION OF PROCEDURE:  Informed consent was obtained.  Patient was placed in left lateral position.  Sedation per anesthesia service.  Olympus video gastroscope was introduced into the oral cavity and esophagus was intubated under direct visualization. The scope was carefully advanced to the distal duodenum.  Patient tolerated the procedure well without any complications.    FINDINGS:  Esophagus: Normal mucosa in proximal and mid esophagus.  There was circumferential inflammation noted just below GE junction in the region of cardia.  This seemed to be a Ly-Brown tear with a possible visible vessel noted around GE junction.  This was cauterized with a 7 German gold probe at the end of procedure.  An Endoclip x1 was also placed with good hemostasis.  Stomach: Fundus, cardia, body and antrum appeared unremarkable except presence of circumferential erythema with possible Ly-Brown tear on retroflexion in the cardia as above.  Duodenum:  There was smooth surface 8-9 mm polypoid lesion noted in the duodenal bulb which was not biopsied during this procedure due to recent GI bleed.  Photodocumentation was obtained.  There seemed to be some deformity of the apex of the duodenal bulb.  I was able to reach 2nd portion of the duodenal with gentle maneuvering.  No bleeding or any stigmata of recent bleed was noted.    ESTIMATED BLOOD LOSS:   None.    COMPLICATIONS:  None    RECOMMENDATIONS:  1.  Clear to full liquid diet today as tolerated.  2. PPI along with Carafate.    3. Serial hemoglobin/hematocrit.

## 2024-12-22 NOTE — CONSULTS
Sarabjit Ontiveros   MRN: [id number]   ADMISSION DATE: 2024  : 1965  AGE: 59 y.o.    DATE :  2024       PROVIDER: SENTHIL COLON    REASON FOR REFERRAL   GI bleed, occult positive, anemia     SUBJECTIVE:  Sarabjit Ontiveros is a 59 y.o. male who  has a past medical history of Chronic pain disorder, Depression, Hypertension, and Stroke.. The patient presented to [facility] on 2024 with a primary complaint of melena for 2 days along with worsening headache.  Patient does have history of cerebral aneurysm along with migraines.  Patient never has a history of GI bleed before.  Also has some epigastric discomfort.  Takes BC powder for his headaches and on occasion takes his many 5-8 per day.  Patient also has been noting lethargy and worsening generalized weakness.  Denies any over-the-counter NSAID use.  Denies any other blood thinner use.  Has had history of colonoscopy done last year per Dr. AISHA Lopez which showed diverticular disease of sigmoid grade 1-2 hemorrhoids.  No other significant findings  Hemoglobin on arrival was noted to be at 8.6, drop from previous 15.  He was given 2 units of PRBC and transferred to us for further care     Review of Systems   Except as documented, all other systems reviewed and negative     Review of patient's allergies indicates:  No Known Allergies      [START ON 2024] amLODIPine  10 mg Oral Daily    pantoprazole  40 mg Intravenous BID       Medications Discontinued During This Encounter   Medication Reason    morphine injection 2 mg              Past Medical History:   Diagnosis Date    Chronic pain disorder     Depression     Hypertension     Stroke       Social History     Socioeconomic History    Marital status: Single   Tobacco Use    Smoking status: Former     Types: Cigarettes    Smokeless tobacco: Former   Substance and Sexual Activity    Alcohol use: Yes     Comment: Occasionally    Drug use: Not Currently     Social Drivers of Health     Financial Resource  Strain: Patient Declined (12/21/2024)    Overall Financial Resource Strain (CARDIA)     Difficulty of Paying Living Expenses: Patient declined   Food Insecurity: Patient Declined (12/21/2024)    Hunger Vital Sign     Worried About Running Out of Food in the Last Year: Patient declined     Ran Out of Food in the Last Year: Patient declined   Transportation Needs: Patient Declined (12/21/2024)    TRANSPORTATION NEEDS     Transportation : Patient declined   Physical Activity: Unknown (10/2/2022)    Exercise Vital Sign     Days of Exercise per Week: Patient declined     Minutes of Exercise per Session: Patient declined   Stress: Patient Declined (12/21/2024)    [employer] - Occupational Stress Questionnaire     Feeling of Stress : Patient declined   Housing Stability: Patient Declined (12/21/2024)    Housing Stability Vital Sign     Unable to Pay for Housing in the Last Year: Patient declined     Homeless in the Last Year: Patient declined      Past Surgical History:   Procedure Laterality Date    COLONOSCOPY N/A 8/24/2023    Procedure: COLONOSCOPY;  Surgeon: Nadir Gregg MD;  Location: [facility];  Service: Endoscopy;  Laterality: N/A;    ROBOT-ASSISTED REPAIR OF UMBILICAL HERNIA USING DA NEIL XI N/A 8/28/2023    Procedure: XI ROBOTIC REPAIR, HERNIA, UMBILICAL;  Surgeon: Nadir Gregg MD;  Location: [facility] OR;  Service: General;  Laterality: N/A;  with mesh        Family History   Problem Relation Name Age of Onset    Coronary artery disease Mother      Coronary artery disease Father            OBJECTIVE:     Vitals:    12/21/24 2000 12/21/24 2008 12/21/24 2151 12/21/24 2323   BP:  (!) 183/75 (!) 159/84 (!) 170/81   Pulse:  102 99 98   Resp: 18   18   Temp:  98.3 °F (36.8 °C)  98.1 °F (36.7 °C)   TempSrc:  Oral  Oral   SpO2: 95% (!) 87%  99%   Weight:       Height:           Physical Exam   General appearance: Well-developed, well-nourished male in no apparent distress.  HENT:  "Atraumatic head. Moist mucous membranes of oral cavity.  Eyes: Normal extraocular movements.   Neck: Supple.   Lungs: Clear to auscultation bilaterally. No wheezing present.   Heart: Regular rate and rhythm. S1 and S2 present with no murmurs/gallop/rub. No pedal edema. No JVD present.   Abdomen: Soft, non-distended, non-tender. No rebound tenderness/guarding. Bowel sounds are normal.   Extremities: No cyanosis, clubbing, or edema.  Skin: No Rash.   Neuro: Motor and sensory exams grossly intact. Good tone. Muscle strength 5/5 in all 4 extremities  Psych/mental status: Appropriate mood and affect. Responds appropriately to questions.     LABS    Recent Labs   Lab 12/21/24  0142 12/21/24  1453   WBC 10.35  --    HGB 8.6* 9.3*   HCT 25.9* 26.5*     --       Recent Labs   Lab 12/21/24  0142   *   K 4.3      CO2 22*   BUN 59.3*   CREATININE 1.10   CALCIUM 8.2*   BILITOT 0.1   ALKPHOS 74   ALT 18   AST 14   GLUCOSE 180*    No results for input(s): "INR" in the last 168 hours. No results for input(s): "AMYLASE" in the last 168 hours. No results for input(s): "APTT", "INR", "PTT" in the last 168 hours.        RESULTS: X-Ray Chest AP Portable    Result Date: 12/21/2024  EXAMINATION: XR CHEST AP PORTABLE CLINICAL HISTORY: Chest Pain; TECHNIQUE: Single frontal view of the chest was performed. COMPARISON: 02/08/2024 FINDINGS: No infiltrates are seen.  Heart size is within normal limits.  Costophrenic angles are clear.  Film is lordotic in position.     No acute disease is seen Electronically signed by: Erich Campbell MD Date:    12/21/2024 Time:    08:06    CTA Brain    Result Date: 12/21/2024  EXAMINATION: CTA HEAD CLINICAL HISTORY: Cerebral aneurysm, follow-up; TECHNIQUE: Non contrast low dose axial images were obtained thought the head. CT angiogram was performed from the level of the bottom of C2 to the top of the head following the IV administration of 100mL of Omnipaque 350.   Sagittal and coronal " reconstructions and maximum intensity projection reconstructions were performed. Automatic exposure control (AEC) was utilized for dose reduction. Dose: 552 mGycm COMPARISON: 02/08/2024 FINDINGS: Intracranial Vascular structures:Internal carotid arteries: Mild atheromatous calcification of the cavernous clinoid segment of the bilateral internal carotid artery is seen without significant stenosis.Middle cerebral arteries: Unremarkable.Anterior cerebral arteries: Unremarkable.Vertebral arteries: Unremarkable.Basilar artery: Unremarkable.Posterior cerebral arteries: Unremarkable.Posterior communicating arteries: Bilateral posterior communicating artery visualized.Jugular Veins and venous sinuses: Unremarkable.Brain parenchyma: No abnormal intracranial enhancement is seen on the post contrast images.     1. Unremarkable CT angiogram of the head. Details and other findings as described above. Electronically signed by: Erich Campbell MD Date:    12/21/2024 Time:    07:15             ICD-10-CM ICD-9-CM   1. Upper gastrointestinal bleed  K92.2 578.9   2. Chest pain  R07.9 786.50            ASSESSMENT & PLAN:    Sarabjit Ontiveros is a 59 y.o. male who  has a past medical history of Chronic pain disorder, Depression, Hypertension, and Stroke.. The patient presented to [facility] on 12/21/2024 with a primary complaint of melena for 2 days along with worsening headache.  Patient does have history of cerebral aneurysm along with migraines.  Patient never has a history of GI bleed before.  Also has some epigastric discomfort.  Takes BC powder for his headaches and on occasion takes his many 5-8 per day.  Patient also has been noting lethargy and worsening generalized weakness.  Denies any over-the-counter NSAID use.  Denies any other blood thinner use.  Has had history of colonoscopy done last year which showed diverticular disease of sigmoid grade 1-2 hemorrhoids.  No other significant findings  Hemoglobin on arrival was noted to be at  8.6, drop from previous 15.  He was given 2 units of PRBC and transferred to us for further care    Recommendations:   1. NPO for now.  2. IV ppi.  Serial hemoglobin/hematocrit.  Transfuse if hemoglobin less than 8.   3. Plan for upper endoscopy on Sunday morning.  Procedure has been explained to the patient.  He agrees and would like to proceed.    4. Need to refrain from NSAIDs.  He could have peptic ulcer disease, esophagitis, gastritis, Dieulafoy's lesion or arteriovenous malformations etc..    Thank you for the consult

## 2024-12-22 NOTE — PLAN OF CARE
Problem: Adult Inpatient Plan of Care  Goal: Plan of Care Review  12/21/2024 2250 by Mervat Castillo RN  Outcome: Progressing  12/21/2024 2250 by Mervat Castillo RN  Outcome: Progressing  Goal: Patient-Specific Goal (Individualized)  12/21/2024 2250 by Mervat Castillo RN  Outcome: Progressing  12/21/2024 2250 by Mervat Castillo RN  Outcome: Progressing  Goal: Absence of Hospital-Acquired Illness or Injury  12/21/2024 2250 by Mervat Castillo RN  Outcome: Progressing  12/21/2024 2250 by Mervat Castillo RN  Outcome: Progressing  Goal: Optimal Comfort and Wellbeing  12/21/2024 2250 by Mervat Castlilo RN  Outcome: Progressing  12/21/2024 2250 by Mervat Castillo RN  Outcome: Progressing  Goal: Readiness for Transition of Care  12/21/2024 2250 by Mervat Castillo RN  Outcome: Progressing  12/21/2024 2250 by Mervat Castillo RN  Outcome: Progressing     Problem: Pain Acute  Goal: Optimal Pain Control and Function  Outcome: Progressing

## 2024-12-22 NOTE — PROGRESS NOTES
[facility] Medicine Progress Note        Chief Complaint: Inpatient Follow-up for     HPI:   Sarabjit Ontiveros is a 59 y.o. male who  has a past medical history of Chronic pain disorder, Depression, Hypertension, and Stroke.. The patient presented to [facility] on 12/21/2024 with a primary complaint of melena for 2 days along with worsening headache.  Patient does have history of cerebral aneurysm along with migraines.  Patient never has a history of GI bleed before.  Also has some epigastric discomfort.  Takes BC powder for his headaches and on occasion takes his many 5-8 per day.  Patient also has been noting lethargy and worsening generalized weakness.  Denies any over-the-counter NSAID use.  Denies any other blood thinner use.  Has had history of colonoscopy done last year which showed diverticular disease of sigmoid grade 1-2 hemorrhoids.  No other significant findings  Hemoglobin on arrival was noted to be at 8.6, drop from previous 15.  He was given 2 units of PRBC and transferred to us for further care       Interval Hx:   Patient seen and examined by bedside.  Complaining of headache.  NPO at this time due to planned EGD today.  No otherwise acute events    Case was discussed with patient's nurse and  on the floor.    Objective/physical exam:  General: In no acute distress, afebrile  Chest: Clear to auscultation bilaterally  Heart: RRR, +S1, S2, no appreciable murmur  Abdomen: Soft, nontender, BS +  MSK: Warm, no lower extremity edema, no clubbing or cyanosis  Neurologic: Alert and oriented x4, Cranial nerve II-XII intact, Strength 5/5 in all 4 extremities    VITAL SIGNS: 24 HRS MIN & MAX LAST   Temp  Min: 97.4 °F (36.3 °C)  Max: 98.8 °F (37.1 °C) 97.9 °F (36.6 °C)   BP  Min: 123/64  Max: 183/75 134/65   Pulse  Min: 86  Max: 103  86   Resp  Min: 15  Max: 22 18   SpO2  Min: 93 %  Max: 100 % 98 %     I have reviewed the following labs:  Recent Labs   Lab  12/21/24  0142 12/21/24  1453 12/22/24  0535   WBC 10.35  --  5.82   RBC 3.10*  --  2.56*   HGB 8.6* 9.3* 7.3*   HCT 25.9* 26.5* 21.1*   MCV 83.5  --  82.4   MCH 27.7  --  28.5   MCHC 33.2  --  34.6   RDW 13.0  --  14.9     --  171   MPV 9.6  --  9.5     Recent Labs   Lab 12/21/24  0142 12/22/24  0535   * 141   K 4.3 3.8    112*   CO2 22* 21*   BUN 59.3* 25.7   CREATININE 1.10 1.17   CALCIUM 8.2* 8.3*   MG 1.70  --    ALBUMIN 2.9* 3.0*   ALKPHOS 74 75   ALT 18 19   AST 14 19   BILITOT 0.1 0.2     Microbiology Results (last 7 days)       ** No results found for the last 168 hours. **             See below for Radiology    Assessment/Plan:  Acute GI bleed, suspect upper   Acute blood loss anemia, secondary to above   Hypertensive urgency wound  History of migraines, cerebral aneurysm, CVA, depression, chronic pain disorder  Medication Noncompliance     Patient follows with Neurosurgery outpatient for his history of cerebral aneurysm  CTA of the head was done at outlying facility which was unremarkable  GI has been consulted, follow up post EGD recommendations  Status post 2 units of PRBC given, initial improvement to 9.3 however decreased back to 7.3 this a.m.   Give 1 unit of PRBC today  IV protonix BID  Fioricet for pain control, along with IM Benadryl and Compazine  CLD per GI  Per nursing patient has not been taking any meds for the past month  resume home amlodipine and lisinopril  Blood pressure regimen may need to be adjusted as patient has not been taking his medications for more than a month    Critical care note:  Critical care diagnosis:  Acute anemia requiring PRBC  Critical care interventions: Hands-on evaluation, review of labs/radiographs/records and discussion with patient and family if present  Critical care time spent: 35 minutes         VTE prophylaxis: scd; unable to anticoagulate due to GI bleed    Patient condition:  Stable/Fair/Guarded/ Serious/ Critical    Anticipated  discharge and Disposition:   likely home with wife      All diagnosis and differential diagnosis have been reviewed; assessment and plan has been documented; I have personally reviewed the labs and test results that are presently available; I have reviewed the patients medication list; I have reviewed the consulting providers response and recommendations. I have reviewed or attempted to review medical records based upon their availability    All of the patient's questions have been  addressed and answered. Patient's is agreeable to the above stated plan. I will continue to monitor closely and make adjustments to medical management as needed.    Portions of this note dictated using EMR integrated voice recognition software, and may be subject to voice recognition errors not corrected at proofreading. Please contact writer for clarification if needed.   _____________________________________________________________________    Malnutrition Status:  Nutrition consulted. Most recent weight and BMI monitored-     Measurements:  Wt Readings from Last 1 Encounters:   12/21/24 91.2 kg (201 lb)   Body mass index is 29.68 kg/m².    Patient has been screened and assessed by RD.    Malnutrition Type:  Context:    Level:      Malnutrition Characteristic Summary:       Interventions/Recommendations (treatment strategy):        Scheduled Med:   amLODIPine  10 mg Oral Daily    lisinopriL  10 mg Oral Daily    pantoprazole  40 mg Intravenous BID      Continuous Infusions:     PRN Meds:    Current Facility-Administered Medications:     0.9%  NaCl infusion (for blood administration), , Intravenous, Q24H PRN    acetaminophen, 1,000 mg, Oral, Q6H PRN    acetaminophen, 650 mg, Oral, Q4H PRN    butalbital-acetaminophen-caffeine -40 mg, 1 tablet, Oral, Q4H PRN    dextrose 50%, 12.5 g, Intravenous, PRN    dextrose 50%, 25 g, Intravenous, PRN    diphenhydrAMINE, 25 mg, Oral, Q6H PRN    glucagon (human recombinant), 1 mg, Intramuscular,  PRN    glucose, 16 g, Oral, PRN    glucose, 24 g, Oral, PRN    labetaloL, 10 mg, Intravenous, Q2H PRN    naloxone, 0.02 mg, Intravenous, PRN    ondansetron, 4 mg, Intravenous, Q4H PRN    prochlorperazine, 5 mg, Intravenous, Q6H PRN    sodium chloride 0.9%, 10 mL, Intravenous, PRN     Radiology:  I have personally reviewed the following imaging and agree with the radiologist.     X-Ray Chest AP Portable  Narrative: EXAMINATION:  XR CHEST AP PORTABLE    CLINICAL HISTORY:  Chest Pain;    TECHNIQUE:  Single frontal view of the chest was performed.    COMPARISON:  02/08/2024    FINDINGS:  No infiltrates are seen.  Heart size is within normal limits.  Costophrenic angles are clear.  Film is lordotic in position.  Impression: No acute disease is seen    Electronically signed by: Erich Campbell MD  Date:    12/21/2024  Time:    08:06  CTA Brain  Narrative: EXAMINATION:  CTA HEAD    CLINICAL HISTORY:  Cerebral aneurysm, follow-up;    TECHNIQUE:  Non contrast low dose axial images were obtained thought the head. CT angiogram was performed from the level of the bottom of C2 to the top of the head following the IV administration of 100mL of Omnipaque 350.   Sagittal and coronal reconstructions and maximum intensity projection reconstructions were performed.    Automatic exposure control (AEC) was utilized for dose reduction.    Dose: 552 mGycm    COMPARISON:  02/08/2024    FINDINGS:  Intracranial Vascular structures:Internal carotid arteries: Mild atheromatous calcification of the cavernous clinoid segment of the bilateral internal carotid artery is seen without significant stenosis.Middle cerebral arteries: Unremarkable.Anterior cerebral arteries: Unremarkable.Vertebral arteries: Unremarkable.Basilar artery: Unremarkable.Posterior cerebral arteries: Unremarkable.Posterior communicating arteries: Bilateral posterior communicating artery visualized.Jugular Veins and venous sinuses: Unremarkable.Brain parenchyma: No abnormal  intracranial enhancement is seen on the post contrast images.  Impression: 1. Unremarkable CT angiogram of the head. Details and other findings as described above.    Electronically signed by: Erich Campbell MD  Date:    12/21/2024  Time:    07:15      Rafaela Kc DO  Department of Hospital Medicine  [facility]  12/22/2024

## 2024-12-22 NOTE — H&P
[facility] Hospital Medicine History & Physical Examination       Patient Name: Sarabjit Ontiveros  MRN: [id number]  Patient Class: IP- Inpatient   Admission Date: 12/21/2024   Admitting Physician: HM Service   Length of Stay: 0  Attending Physician: Rafaela Kc DO  Primary Care Provider: Arjun Bennett MD  Face-to-Face encounter date: 12/21/2024  Code Status:  Full code  Chief Complaint: No chief complaint on file.      Screening for Social Drivers for health:  Patient screened for food insecurity, housing instability, transportation needs, utility difficulties, and interpersonal safety (select all that apply as identified as concern)  []Housing or Food  []Transportation Needs  []Utility Difficulties  []Interpersonal safety  [x]None      Patient information was obtained from patient, patient's family, past medical records and ER records.  ED records were reviewed in detail and documented below    HISTORY OF PRESENT ILLNESS:   Sarabjit Ontiveros is a 59 y.o. male who  has a past medical history of Chronic pain disorder, Depression, Hypertension, and Stroke.. The patient presented to [facility] on 12/21/2024 with a primary complaint of melena for 2 days along with worsening headache.  Patient does have history of cerebral aneurysm along with migraines.  Patient never has a history of GI bleed before.  Also has some epigastric discomfort.  Takes BC powder for his headaches and on occasion takes his many 5-8 per day.  Patient also has been noting lethargy and worsening generalized weakness.  Denies any over-the-counter NSAID use.  Denies any other blood thinner use.  Has had history of colonoscopy done last year which showed diverticular disease of sigmoid grade 1-2 hemorrhoids.  No other significant findings  Hemoglobin on arrival was noted to be at 8.6, drop from previous 15.  He was given 2 units of PRBC and transferred to us for further care      PAST MEDICAL HISTORY:     Past Medical History:    Diagnosis Date    Chronic pain disorder     Depression     Hypertension     Stroke        PAST SURGICAL HISTORY:     Past Surgical History:   Procedure Laterality Date    COLONOSCOPY N/A 8/24/2023    Procedure: COLONOSCOPY;  Surgeon: Nadir Gregg MD;  Location: [facility] ENDO;  Service: Endoscopy;  Laterality: N/A;    ROBOT-ASSISTED REPAIR OF UMBILICAL HERNIA USING DA NEIL XI N/A 8/28/2023    Procedure: XI ROBOTIC REPAIR, HERNIA, UMBILICAL;  Surgeon: Nadir Gregg MD;  Location: [facility] OR;  Service: General;  Laterality: N/A;  with mesh       ALLERGIES:   Patient has no known allergies.    FAMILY HISTORY:   Reviewed and negative    SOCIAL HISTORY:     Social History     Tobacco Use    Smoking status: Former     Types: Cigarettes    Smokeless tobacco: Former   Substance Use Topics    Alcohol use: Yes     Comment: Occasionally        HOME MEDICATIONS:     Prior to Admission medications    Medication Sig Start Date End Date Taking? Authorizing Provider   acetaminophen (TYLENOL) 325 MG tablet Take 2 tablets (650 mg total) by mouth every 8 (eight) hours as needed. 10/3/22   Asia Hill MD   amLODIPine (NORVASC) 10 MG tablet Take 10 mg by mouth once daily. 1/18/23   Provider, Historical   amLODIPine (NORVASC) 10 MG tablet Take 1 tablet (10 mg total) by mouth once daily. 2/7/24   Maikel Owens MD   bisoproloL-hydrochlorothiazide (ZIAC) 10-6.25 mg per tablet Take 1 tablet by mouth once daily. 1/18/23   Provider, Historical   bisoproloL-hydrochlorothiazide (ZIAC) 10-6.25 mg per tablet Take 1 tablet by mouth once daily. 2/7/24   Maikel Owens MD   butalbital-acetaminophen-caffeine -40 mg (FIORICET, ESGIC) -40 mg per tablet Take 1 tablet by mouth every 4 (four) hours as needed for Headaches. 2/8/24   Chilo Deluna MD   lisinopriL 10 MG tablet Take 10 mg by mouth once daily. 1/18/23   Provider, Historical   lisinopriL 10 MG tablet Take 1 tablet (10 mg total) by mouth once  daily. 2/7/24   Maikel Owens MD   ondansetron (ZOFRAN) 4 MG tablet Take 1 tablet (4 mg total) by mouth every 6 (six) hours. 9/3/24   Deon Delvalle MD   tiZANidine (ZANAFLEX) 4 MG tablet Take 4 mg by mouth nightly as needed. 12/4/23   Provider, Historical       REVIEW OF SYSTEMS:   Except as documented, all other systems reviewed and negative     PHYSICAL EXAM:     VITAL SIGNS: 24 HRS MIN & MAX LAST   Temp  Min: 97.4 °F (36.3 °C)  Max: 98.5 °F (36.9 °C) 98.2 °F (36.8 °C)   BP  Min: 122/77  Max: 161/93 (!) 158/90   Pulse  Min: 92  Max: 112  92   Resp  Min: 14  Max: 20 18   SpO2  Min: 95 %  Max: 100 % 99 %     General appearance: Well-developed, well-nourished male in no apparent distress.  HENT: Atraumatic head. Moist mucous membranes of oral cavity.  Eyes: Normal extraocular movements.   Neck: Supple.   Lungs: Clear to auscultation bilaterally. No wheezing present.   Heart: Regular rate and rhythm. S1 and S2 present with no murmurs/gallop/rub. No pedal edema. No JVD present.   Abdomen: Soft, non-distended, non-tender. No rebound tenderness/guarding. Bowel sounds are normal.   Extremities: No cyanosis, clubbing, or edema.  Skin: No Rash.   Neuro: Motor and sensory exams grossly intact. Good tone. Muscle strength 5/5 in all 4 extremities  Psych/mental status: Appropriate mood and affect. Responds appropriately to questions.     LABS AND IMAGING:     Recent Labs   Lab 12/21/24  0142 12/21/24  1453   WBC 10.35  --    RBC 3.10*  --    HGB 8.6* 9.3*   HCT 25.9* 26.5*   MCV 83.5  --    MCH 27.7  --    MCHC 33.2  --    RDW 13.0  --      --    MPV 9.6  --        Recent Labs   Lab 12/21/24  0142   *   K 4.3      CO2 22*   BUN 59.3*   CREATININE 1.10   CALCIUM 8.2*   MG 1.70   ALBUMIN 2.9*   ALKPHOS 74   ALT 18   AST 14   BILITOT 0.1       Microbiology Results (last 7 days)       ** No results found for the last 168 hours. **             X-Ray Chest AP Portable  Narrative: EXAMINATION:  XR CHEST AP  PORTABLE    CLINICAL HISTORY:  Chest Pain;    TECHNIQUE:  Single frontal view of the chest was performed.    COMPARISON:  02/08/2024    FINDINGS:  No infiltrates are seen.  Heart size is within normal limits.  Costophrenic angles are clear.  Film is lordotic in position.  Impression: No acute disease is seen    Electronically signed by: Erich Campbell MD  Date:    12/21/2024  Time:    08:06  CTA Brain  Narrative: EXAMINATION:  CTA HEAD    CLINICAL HISTORY:  Cerebral aneurysm, follow-up;    TECHNIQUE:  Non contrast low dose axial images were obtained thought the head. CT angiogram was performed from the level of the bottom of C2 to the top of the head following the IV administration of 100mL of Omnipaque 350.   Sagittal and coronal reconstructions and maximum intensity projection reconstructions were performed.    Automatic exposure control (AEC) was utilized for dose reduction.    Dose: 552 mGycm    COMPARISON:  02/08/2024    FINDINGS:  Intracranial Vascular structures:Internal carotid arteries: Mild atheromatous calcification of the cavernous clinoid segment of the bilateral internal carotid artery is seen without significant stenosis.Middle cerebral arteries: Unremarkable.Anterior cerebral arteries: Unremarkable.Vertebral arteries: Unremarkable.Basilar artery: Unremarkable.Posterior cerebral arteries: Unremarkable.Posterior communicating arteries: Bilateral posterior communicating artery visualized.Jugular Veins and venous sinuses: Unremarkable.Brain parenchyma: No abnormal intracranial enhancement is seen on the post contrast images.  Impression: 1. Unremarkable CT angiogram of the head. Details and other findings as described above.    Electronically signed by: Erich Campbell MD  Date:    12/21/2024  Time:    07:15      ASSESSMENT & PLAN:   Acute GI bleed, suspect upper   Acute blood loss anemia, secondary to above   History of migraines, cerebral aneurysm, CVA, depression, chronic pain disorder  Medication  Noncompliance    Patient follows with Neurosurgery outpatient for his history of cerebral aneurysm  CTA of the head was done at [facility] facility which was unremarkable  GI has been consulted   Status post 2 units of PRBC given  IV protonix BID  Fioricet for pain control  CLD per GI  Per nursing patient has not been taking any meds for the past month      VTE Prophylaxis: scds    Patient condition:  stable    __________________________________________________________________________  INPATIENT LIST OF MEDICATIONS     Scheduled Meds:  Continuous Infusions:  PRN Meds:.  Current Facility-Administered Medications:     acetaminophen, 1,000 mg, Oral, Q6H PRN    acetaminophen, 650 mg, Oral, Q4H PRN    butalbital-acetaminophen-caffeine -40 mg, 1 tablet, Oral, Q4H PRN    dextrose 50%, 12.5 g, Intravenous, PRN    dextrose 50%, 25 g, Intravenous, PRN    glucagon (human recombinant), 1 mg, Intramuscular, PRN    glucose, 16 g, Oral, PRN    glucose, 24 g, Oral, PRN    naloxone, 0.02 mg, Intravenous, PRN    ondansetron, 4 mg, Intravenous, Q4H PRN    prochlorperazine, 5 mg, Intravenous, Q6H PRN    sodium chloride 0.9%, 10 mL, Intravenous, PRN    All diagnosis and differential diagnosis have been reviewed; assessment and plan has been documented; I have personally reviewed the labs and test results that are presently available; I have reviewed the patients medication list; I have reviewed the consulting providers response and recommendations. I have reviewed or attempted to review medical records based upon their availability.    All of the patient and family questions have been addressed and answered. Patient's is agreeable to the above stated plan. I will continue to monitor closely and make adjustments to medical management as needed.    If patient was admitted under observational status it is with my approval/permission.      Rafaela Kc,   Department of Hospital Medicine  [facility]  [place]  12/21/2024

## 2024-12-22 NOTE — ANESTHESIA POSTPROCEDURE EVALUATION
Anesthesia Post Evaluation    Patient: Sarabjit Ontiveros    Procedure(s) Performed: Procedure(s) (LRB):  EGD (ESOPHAGOGASTRODUODENOSCOPY) (N/A)    Final Anesthesia Type: general      Patient location during evaluation: floor  Patient participation: Yes- Able to Participate  Level of consciousness: awake and alert  Post-procedure vital signs: reviewed and stable  Pain management: adequate  Airway patency: patent    PONV status at discharge: No PONV  Anesthetic complications: no      Cardiovascular status: blood pressure returned to baseline  Respiratory status: spontaneous ventilation and room air  Hydration status: euvolemic  Follow-up not needed.              Vitals Value Taken Time   /65 12/22/24 1220   Temp 36.6 °C (97.9 °F) 12/22/24 1207   Pulse 86 12/22/24 1220   Resp 18 12/22/24 1220   SpO2 98 % 12/22/24 1220         No case tracking events are documented in the log.      Pain/Corrie Score: Pain Rating Prior to Med Admin: 7 (12/22/2024  1:06 PM)  Pain Rating Post Med Admin: 8 (12/21/2024 11:31 PM)  Corrie Score: 10 (12/22/2024 12:20 PM)

## 2024-12-23 PROBLEM — G43.909 MIGRAINE: Status: ACTIVE | Noted: 2024-12-23

## 2024-12-23 LAB
HCT VFR BLD AUTO: 23.2 % (ref 42–52)
HGB BLD-MCNC: 7.9 G/DL (ref 14–18)

## 2024-12-23 PROCEDURE — 36415 COLL VENOUS BLD VENIPUNCTURE: CPT | Performed by: STUDENT IN AN ORGANIZED HEALTH CARE EDUCATION/TRAINING PROGRAM

## 2024-12-23 PROCEDURE — 25000003 PHARM REV CODE 250: Performed by: STUDENT IN AN ORGANIZED HEALTH CARE EDUCATION/TRAINING PROGRAM

## 2024-12-23 PROCEDURE — 63600175 PHARM REV CODE 636 W HCPCS: Performed by: STUDENT IN AN ORGANIZED HEALTH CARE EDUCATION/TRAINING PROGRAM

## 2024-12-23 PROCEDURE — 25000003 PHARM REV CODE 250

## 2024-12-23 PROCEDURE — 63600175 PHARM REV CODE 636 W HCPCS

## 2024-12-23 PROCEDURE — 21400001 HC TELEMETRY ROOM

## 2024-12-23 PROCEDURE — 85018 HEMOGLOBIN: CPT | Performed by: STUDENT IN AN ORGANIZED HEALTH CARE EDUCATION/TRAINING PROGRAM

## 2024-12-23 RX ORDER — DIVALPROEX SODIUM 500 MG/1
500 TABLET, FILM COATED, EXTENDED RELEASE ORAL DAILY
Status: DISCONTINUED | OUTPATIENT
Start: 2024-12-24 | End: 2024-12-24 | Stop reason: HOSPADM

## 2024-12-23 RX ORDER — SODIUM CHLORIDE 9 MG/ML
500 INJECTION, SOLUTION INTRAVENOUS ONCE
Status: COMPLETED | OUTPATIENT
Start: 2024-12-23 | End: 2024-12-23

## 2024-12-23 RX ORDER — DIPHENHYDRAMINE HYDROCHLORIDE 50 MG/ML
25 INJECTION INTRAMUSCULAR; INTRAVENOUS ONCE
Status: COMPLETED | OUTPATIENT
Start: 2024-12-23 | End: 2024-12-23

## 2024-12-23 RX ORDER — PROMETHAZINE HYDROCHLORIDE 25 MG/ML
12.5 INJECTION, SOLUTION INTRAMUSCULAR; INTRAVENOUS ONCE
Status: COMPLETED | OUTPATIENT
Start: 2024-12-23 | End: 2024-12-23

## 2024-12-23 RX ORDER — MAGNESIUM SULFATE 1 G/100ML
1 INJECTION INTRAVENOUS ONCE
Status: COMPLETED | OUTPATIENT
Start: 2024-12-23 | End: 2024-12-23

## 2024-12-23 RX ADMIN — AMLODIPINE BESYLATE 10 MG: 5 TABLET ORAL at 08:12

## 2024-12-23 RX ADMIN — PANTOPRAZOLE SODIUM 40 MG: 40 INJECTION, POWDER, LYOPHILIZED, FOR SOLUTION INTRAVENOUS at 08:12

## 2024-12-23 RX ADMIN — METOCLOPRAMIDE 10 MG: 5 INJECTION, SOLUTION INTRAMUSCULAR; INTRAVENOUS at 02:12

## 2024-12-23 RX ADMIN — SUCRALFATE 1 G: 1 TABLET ORAL at 08:12

## 2024-12-23 RX ADMIN — PROMETHAZINE HYDROCHLORIDE 12.5 MG: 25 INJECTION INTRAMUSCULAR; INTRAVENOUS at 02:12

## 2024-12-23 RX ADMIN — LISINOPRIL 10 MG: 10 TABLET ORAL at 08:12

## 2024-12-23 RX ADMIN — DIPHENHYDRAMINE HYDROCHLORIDE 50 MG: 50 INJECTION INTRAMUSCULAR; INTRAVENOUS at 08:12

## 2024-12-23 RX ADMIN — DIPHENHYDRAMINE HYDROCHLORIDE 25 MG: 50 INJECTION INTRAMUSCULAR; INTRAVENOUS at 02:12

## 2024-12-23 RX ADMIN — DIPHENHYDRAMINE HYDROCHLORIDE 50 MG: 50 INJECTION INTRAMUSCULAR; INTRAVENOUS at 09:12

## 2024-12-23 RX ADMIN — METOCLOPRAMIDE 10 MG: 5 INJECTION, SOLUTION INTRAMUSCULAR; INTRAVENOUS at 08:12

## 2024-12-23 RX ADMIN — SUCRALFATE 1 G: 1 TABLET ORAL at 04:12

## 2024-12-23 RX ADMIN — BUTALBITAL, ACETAMINOPHEN, AND CAFFEINE 1 TABLET: 325; 50; 40 TABLET ORAL at 09:12

## 2024-12-23 RX ADMIN — SODIUM CHLORIDE 500 ML: 9 INJECTION, SOLUTION INTRAVENOUS at 02:12

## 2024-12-23 RX ADMIN — MAGNESIUM SULFATE IN DEXTROSE 1 G: 10 INJECTION, SOLUTION INTRAVENOUS at 02:12

## 2024-12-23 RX ADMIN — PANTOPRAZOLE SODIUM 40 MG: 40 INJECTION, POWDER, LYOPHILIZED, FOR SOLUTION INTRAVENOUS at 09:12

## 2024-12-23 RX ADMIN — BUTALBITAL, ACETAMINOPHEN, AND CAFFEINE 1 TABLET: 325; 50; 40 TABLET ORAL at 11:12

## 2024-12-23 RX ADMIN — DIPHENHYDRAMINE HYDROCHLORIDE 50 MG: 50 INJECTION INTRAMUSCULAR; INTRAVENOUS at 02:12

## 2024-12-23 RX ADMIN — METOCLOPRAMIDE 10 MG: 5 INJECTION, SOLUTION INTRAMUSCULAR; INTRAVENOUS at 09:12

## 2024-12-23 RX ADMIN — SUCRALFATE 1 G: 1 TABLET ORAL at 11:12

## 2024-12-23 NOTE — PROGRESS NOTES
[facility] Hospital Medicine Progress Note        Chief Complaint: Inpatient Follow-up for     HPI:   Sarabjit Ontiveros is a 59 y.o. male who  has a past medical history of Chronic pain disorder, Depression, Hypertension, and Stroke.. The patient presented to [facility] on 12/21/2024 with a primary complaint of melena for 2 days along with worsening headache.  Patient does have history of cerebral aneurysm along with migraines.  Patient never has a history of GI bleed before.  Also has some epigastric discomfort.  Takes BC powder for his headaches and on occasion takes his many 5-8 per day.  Patient also has been noting lethargy and worsening generalized weakness.  Denies any over-the-counter NSAID use.  Denies any other blood thinner use.  Has had history of colonoscopy done last year which showed diverticular disease of sigmoid grade 1-2 hemorrhoids.  No other significant findings  Hemoglobin on arrival was noted to be at 8.6, drop from previous 15.  He was given 2 units of PRBC and transferred to us for further care       Interval Hx:   Patient seen and examined by bedside.  Sleeping upon arrival.  When awakened that he still is having his persistent migraine headache.  Has not had a bowel movement since arrival      Case was discussed with patient's nurse and  on the floor.    Objective/physical exam:  General: In no acute distress, afebrile  Chest: Clear to auscultation bilaterally  Heart: RRR, +S1, S2, no appreciable murmur  Abdomen: Soft, nontender, BS +  MSK: Warm, no lower extremity edema, no clubbing or cyanosis  Neurologic: Alert and oriented x4, Cranial nerve II-XII intact, Strength 5/5 in all 4 extremities    VITAL SIGNS: 24 HRS MIN & MAX LAST   Temp  Min: 97.4 °F (36.3 °C)  Max: 98.7 °F (37.1 °C) 97.4 °F (36.3 °C)   BP  Min: 116/66  Max: 149/81 (!) 146/78   Pulse  Min: 72  Max: 106  91   Resp  Min: 15  Max: 22 18   SpO2  Min: 79 %  Max: 100 % 97 %     I have reviewed  the following labs:  Recent Labs   Lab 12/21/24  0142 12/21/24  1453 12/22/24  0535 12/23/24  0812   WBC 10.35  --  5.82  --    RBC 3.10*  --  2.56*  --    HGB 8.6* 9.3* 7.3* 7.9*   HCT 25.9* 26.5* 21.1* 23.2*   MCV 83.5  --  82.4  --    MCH 27.7  --  28.5  --    MCHC 33.2  --  34.6  --    RDW 13.0  --  14.9  --      --  171  --    MPV 9.6  --  9.5  --      Recent Labs   Lab 12/21/24  0142 12/22/24  0535   * 141   K 4.3 3.8    112*   CO2 22* 21*   BUN 59.3* 25.7   CREATININE 1.10 1.17   CALCIUM 8.2* 8.3*   MG 1.70  --    ALBUMIN 2.9* 3.0*   ALKPHOS 74 75   ALT 18 19   AST 14 19   BILITOT 0.1 0.2     Microbiology Results (last 7 days)       ** No results found for the last 168 hours. **             See below for Radiology    Assessment/Plan:  Acute GI bleed, suspect upper   Acute blood loss anemia, secondary to above   Hypertensive urgency wound  History of migraines, cerebral aneurysm, CVA, depression, chronic pain disorder  Medication Noncompliance     Patient follows with Neurosurgery outpatient for his history of cerebral aneurysm  CTA of the head was done at outlying facility which was unremarkable  GI following, EGD done which showed normal mucosa along with a Ly-Brown tear with possible visible vessel which was cauterized.  An Endoclip was paced with good hemostasis  Status post 2 units of PRBC given, initial improvement to 9.3 however decreased back to 7.3 and was given a unit of PRBC on 12/22  Hemoglobin slightly up at 7.9  Has not had a bowel movement since then  IV protonix BID  Fioricet for pain control, along with IM Benadryl and Compazine  CLD per GI  Per nursing patient has not been taking any meds for the past month  resume home amlodipine and lisinopril  Blood pressure regimen may need to be adjusted as patient has not been taking his medications for more than a month  We will consult Neurology for persistent headache/migraine for recs     VTE prophylaxis: scd; unable to  anticoagulate due to GI bleed    Patient condition:  Stable/Fair/Guarded/ Serious/ Critical    Anticipated discharge and Disposition:   likely home with wife      All diagnosis and differential diagnosis have been reviewed; assessment and plan has been documented; I have personally reviewed the labs and test results that are presently available; I have reviewed the patients medication list; I have reviewed the consulting providers response and recommendations. I have reviewed or attempted to review medical records based upon their availability    All of the patient's questions have been  addressed and answered. Patient's is agreeable to the above stated plan. I will continue to monitor closely and make adjustments to medical management as needed.    Portions of this note dictated using EMR integrated voice recognition software, and may be subject to voice recognition errors not corrected at proofreading. Please contact writer for clarification if needed.   _____________________________________________________________________    Malnutrition Status:  Nutrition consulted. Most recent weight and BMI monitored-     Measurements:  Wt Readings from Last 1 Encounters:   12/21/24 91.2 kg (201 lb)   Body mass index is 29.68 kg/m².    Patient has been screened and assessed by RD.    Malnutrition Type:  Context:    Level:      Malnutrition Characteristic Summary:       Interventions/Recommendations (treatment strategy):        Scheduled Med:   amLODIPine  10 mg Oral Daily    lisinopriL  10 mg Oral Daily    pantoprazole  40 mg Intravenous BID    sucralfate  1 g Oral QID (AC & HS)      Continuous Infusions:     PRN Meds:    Current Facility-Administered Medications:     0.9%  NaCl infusion (for blood administration), , Intravenous, Q24H PRN    acetaminophen, 1,000 mg, Oral, Q6H PRN    acetaminophen, 650 mg, Oral, Q4H PRN    butalbital-acetaminophen-caffeine -40 mg, 1 tablet, Oral, Q4H PRN    dextrose 50%, 12.5 g,  Intravenous, PRN    dextrose 50%, 25 g, Intravenous, PRN    diphenhydrAMINE, 50 mg, Intravenous, Q6H PRN    glucagon (human recombinant), 1 mg, Intramuscular, PRN    glucose, 16 g, Oral, PRN    glucose, 24 g, Oral, PRN    labetaloL, 10 mg, Intravenous, Q2H PRN    metoclopramide, 10 mg, Intravenous, Q6H PRN    naloxone, 0.02 mg, Intravenous, PRN    ondansetron, 4 mg, Intravenous, Q4H PRN    prochlorperazine, 5 mg, Intravenous, Q6H PRN    sodium chloride 0.9%, 10 mL, Intravenous, PRN     Radiology:  I have personally reviewed the following imaging and agree with the radiologist.     X-Ray Chest AP Portable  Narrative: EXAMINATION:  XR CHEST AP PORTABLE    CLINICAL HISTORY:  Chest Pain;    TECHNIQUE:  Single frontal view of the chest was performed.    COMPARISON:  02/08/2024    FINDINGS:  No infiltrates are seen.  Heart size is within normal limits.  Costophrenic angles are clear.  Film is lordotic in position.  Impression: No acute disease is seen    Electronically signed by: Erich Campbell MD  Date:    12/21/2024  Time:    08:06  CTA Brain  Narrative: EXAMINATION:  CTA HEAD    CLINICAL HISTORY:  Cerebral aneurysm, follow-up;    TECHNIQUE:  Non contrast low dose axial images were obtained thought the head. CT angiogram was performed from the level of the bottom of C2 to the top of the head following the IV administration of 100mL of Omnipaque 350.   Sagittal and coronal reconstructions and maximum intensity projection reconstructions were performed.    Automatic exposure control (AEC) was utilized for dose reduction.    Dose: 552 mGycm    COMPARISON:  02/08/2024    FINDINGS:  Intracranial Vascular structures:Internal carotid arteries: Mild atheromatous calcification of the cavernous clinoid segment of the bilateral internal carotid artery is seen without significant stenosis.Middle cerebral arteries: Unremarkable.Anterior cerebral arteries: Unremarkable.Vertebral arteries: Unremarkable.Basilar artery:  Unremarkable.Posterior cerebral arteries: Unremarkable.Posterior communicating arteries: Bilateral posterior communicating artery visualized.Jugular Veins and venous sinuses: Unremarkable.Brain parenchyma: No abnormal intracranial enhancement is seen on the post contrast images.  Impression: 1. Unremarkable CT angiogram of the head. Details and other findings as described above.    Electronically signed by: Erich Campbell MD  Date:    12/21/2024  Time:    07:15      Rafaela Kc DO  Department of Hospital Medicine  [facility]  12/23/2024

## 2024-12-23 NOTE — ASSESSMENT & PLAN NOTE
Migraine cocktail ordered   Started on Depakote 500 mg p.o. daily for migraine prevention  MRI brain w/o ordered

## 2024-12-23 NOTE — HPI
59-year-old male with PMH chronic pain disorder, depression, migraines, HTN, cerebral aneurysms, and CVA who presented to ED on 12/21 with melena and associated headache x2 days.  Underwent EGD and was found to have Ly-Brown tear with possible visible vessel which was cauterized, and was given total 3 units PRBCs.  Patient with presented migraines throughout admission which he has been given IM Benadryl and Compazine and Fioricet, w/o significant relief.  Reports headaches with associated photophobia and intermittent blurred vision bilaterally.  Denies n/v, phonophobia, dizziness, or gait changes.    CTA brain on 12/21 unrevealing for aneurysms, flow-limiting stenosis, or LVO.   Most recent labs significant for H/H 7.9/23.2, and otherwise unremarkable.

## 2024-12-23 NOTE — SUBJECTIVE & OBJECTIVE
Past Medical History:   Diagnosis Date    Chronic pain disorder     Depression     Hypertension     Stroke        Past Surgical History:   Procedure Laterality Date    COLONOSCOPY N/A 8/24/2023    Procedure: COLONOSCOPY;  Surgeon: Nadir Gregg MD;  Location: [facility] ENDO;  Service: Endoscopy;  Laterality: N/A;    ESOPHAGOGASTRODUODENOSCOPY N/A 12/22/2024    Procedure: EGD (ESOPHAGOGASTRODUODENOSCOPY);  Surgeon: Aaron Jones MD;  Location: [facility] OR;  Service: Gastroenterology;  Laterality: N/A;    ROBOT-ASSISTED REPAIR OF UMBILICAL HERNIA USING DA NEIL XI N/A 8/28/2023    Procedure: XI ROBOTIC REPAIR, HERNIA, UMBILICAL;  Surgeon: Nadir Gregg MD;  Location: [facility] OR;  Service: General;  Laterality: N/A;  with mesh       Review of patient's allergies indicates:  No Known Allergies    Current Neurological Medications:     No current facility-administered medications on file prior to encounter.     Current Outpatient Medications on File Prior to Encounter   Medication Sig    acetaminophen (TYLENOL) 325 MG tablet Take 2 tablets (650 mg total) by mouth every 8 (eight) hours as needed.    amLODIPine (NORVASC) 10 MG tablet Take 10 mg by mouth once daily.    amLODIPine (NORVASC) 10 MG tablet Take 1 tablet (10 mg total) by mouth once daily.    bisoproloL-hydrochlorothiazide (ZIAC) 10-6.25 mg per tablet Take 1 tablet by mouth once daily.    bisoproloL-hydrochlorothiazide (ZIAC) 10-6.25 mg per tablet Take 1 tablet by mouth once daily.    butalbital-acetaminophen-caffeine -40 mg (FIORICET, ESGIC) -40 mg per tablet Take 1 tablet by mouth every 4 (four) hours as needed for Headaches.    lisinopriL 10 MG tablet Take 10 mg by mouth once daily.    lisinopriL 10 MG tablet Take 1 tablet (10 mg total) by mouth once daily.    ondansetron (ZOFRAN) 4 MG tablet Take 1 tablet (4 mg total) by mouth every 6 (six) hours.    tiZANidine (ZANAFLEX) 4 MG tablet Take 4 mg by mouth nightly as needed.     Family History        Problem Relation (Age of Onset)    Coronary artery disease Mother, Father          Tobacco Use    Smoking status: Former     Types: Cigarettes    Smokeless tobacco: Former   Substance and Sexual Activity    Alcohol use: Yes     Comment: Occasionally    Drug use: Not Currently    Sexual activity: Not on file     Review of Systems  A 14pt ros was reviewed & is negative unless o/w documented in the hpi    Objective:     Vital Signs (Most Recent):  Temp: 97.6 °F (36.4 °C) (12/23/24 1139)  Pulse: 92 (12/23/24 1139)  Resp: 18 (12/23/24 1139)  BP: (!) 140/66 (12/23/24 1139)  SpO2: 98 % (12/23/24 1139) Vital Signs (24h Range):  Temp:  [97.4 °F (36.3 °C)-98.7 °F (37.1 °C)] 97.6 °F (36.4 °C)  Pulse:  [] 92  Resp:  [18] 18  SpO2:  [96 %-99 %] 98 %  BP: (118-146)/(66-78) 140/66     Weight: 91.2 kg (201 lb)  Body mass index is 29.68 kg/m².     Physical Exam  Vitals reviewed.   Constitutional:       General: He is awake.      Appearance: Normal appearance.   HENT:      Head: Normocephalic and atraumatic.      Nose: Nose normal.      Mouth/Throat:      Mouth: Mucous membranes are moist.      Pharynx: Oropharynx is clear.   Eyes:      Extraocular Movements: Extraocular movements intact and EOM normal.      Pupils: Pupils are equal, round, and reactive to light.   Pulmonary:      Effort: Pulmonary effort is normal.   Musculoskeletal:         General: Normal range of motion.      Cervical back: Normal range of motion.   Skin:     General: Skin is warm and dry.   Neurological:      General: No focal deficit present.      Mental Status: He is alert and oriented to person, place, and time.      Motor: Motor strength is normal.     Coordination: Finger-Nose-Finger Test and Heel to Shin Test normal.   Psychiatric:         Mood and Affect: Mood normal.         Speech: Speech normal.         Behavior: Behavior normal. Behavior is cooperative.         Thought Content: Thought content normal.          NEUROLOGICAL EXAMINATION:      MENTAL STATUS   Oriented to person, place, and time.   Follows 3 step commands.   Attention: normal. Concentration: normal.   Speech: speech is normal   Level of consciousness: alert  Knowledge: good.   Normal comprehension.     CRANIAL NERVES     CN II   Visual fields full to confrontation.     CN III, IV, VI   Pupils are equal, round, and reactive to light.  Extraocular motions are normal.   Nystagmus: none   Ophthalmoparesis: none  Conjugate gaze: present    CN V   Facial sensation intact.     CN VII   Facial expression full, symmetric.     CN XI   CN XI normal.     MOTOR EXAM   Muscle bulk: normal  Overall muscle tone: normal  Right arm pronator drift: absent  Left arm pronator drift: absent    Strength   Strength 5/5 throughout.     SENSORY EXAM   Light touch normal.     GAIT AND COORDINATION      Coordination   Finger to nose coordination: normal  Heel to shin coordination: normal      Significant Labs:   Recent Lab Results         12/23/24  0812        Hematocrit 23.2       Hemoglobin 7.9               Significant Imaging:  CTA head:  Impression:     1. Unremarkable CT angiogram of the head. Details and other findings as described above.        I have reviewed all pertinent imaging results/findings within the past 24 hours.

## 2024-12-23 NOTE — CONSULTS
[facility] - 9th Floor Medical Telemetry  Neurology  Consult Note    Patient Name: Sarabjit Ontiveros  MRN: [id number]  Admission Date: 12/21/2024  Hospital Length of Stay: 2 days  Code Status: Full Code   Attending Provider: Rafaela Kc DO   Consulting Provider: NAA Quevedo  Primary Care Physician: Arjun Bennett MD  Principal Problem:<principal problem not specified>    Inpatient consult to Neurology  Consult performed by: Suzie Clements AGACNP-BC  Consult ordered by: Rafaela Kc DO         Subjective:     Chief Complaint:       HPI:   59-year-old male with PMH chronic pain disorder, depression, migraines, HTN, cerebral aneurysms, and CVA who presented to ED on 12/21 with melena and associated headache x2 days.  Underwent EGD and was found to have Ly-Brown tear with possible visible vessel which was cauterized, and was given total 3 units PRBCs.  Patient with presented migraines throughout admission which he has been given IM Benadryl and Compazine and Fioricet, w/o significant relief.  Reports headaches with associated photophobia and intermittent blurred vision bilaterally.  Denies n/v, phonophobia, dizziness, or gait changes.    CTA brain on 12/21 unrevealing for aneurysms, flow-limiting stenosis, or LVO.   Most recent labs significant for H/H 7.9/23.2, and otherwise unremarkable.     Past Medical History:   Diagnosis Date    Chronic pain disorder     Depression     Hypertension     Stroke        Past Surgical History:   Procedure Laterality Date    COLONOSCOPY N/A 8/24/2023    Procedure: COLONOSCOPY;  Surgeon: Nadir Gregg MD;  Location: [facility];  Service: Endoscopy;  Laterality: N/A;    ESOPHAGOGASTRODUODENOSCOPY N/A 12/22/2024    Procedure: EGD (ESOPHAGOGASTRODUODENOSCOPY);  Surgeon: Aaron Jones MD;  [facility]: [facility];  Service: Gastroenterology;  Laterality: N/A;    ROBOT-ASSISTED REPAIR OF UMBILICAL HERNIA USING DA NEIL XI N/A 8/28/2023    Procedure: XI ROBOTIC  REPAIR, HERNIA, UMBILICAL;  Surgeon: Nadir Gregg MD;  Location: [facility];  Service: General;  Laterality: N/A;  with mesh       Review of patient's allergies indicates:  No Known Allergies    Current Neurological Medications:     No current facility-administered medications on file prior to encounter.     Current Outpatient Medications on File Prior to Encounter   Medication Sig    acetaminophen (TYLENOL) 325 MG tablet Take 2 tablets (650 mg total) by mouth every 8 (eight) hours as needed.    amLODIPine (NORVASC) 10 MG tablet Take 10 mg by mouth once daily.    amLODIPine (NORVASC) 10 MG tablet Take 1 tablet (10 mg total) by mouth once daily.    bisoproloL-hydrochlorothiazide (ZIAC) 10-6.25 mg per tablet Take 1 tablet by mouth once daily.    bisoproloL-hydrochlorothiazide (ZIAC) 10-6.25 mg per tablet Take 1 tablet by mouth once daily.    butalbital-acetaminophen-caffeine -40 mg (FIORICET, ESGIC) -40 mg per tablet Take 1 tablet by mouth every 4 (four) hours as needed for Headaches.    lisinopriL 10 MG tablet Take 10 mg by mouth once daily.    lisinopriL 10 MG tablet Take 1 tablet (10 mg total) by mouth once daily.    ondansetron (ZOFRAN) 4 MG tablet Take 1 tablet (4 mg total) by mouth every 6 (six) hours.    tiZANidine (ZANAFLEX) 4 MG tablet Take 4 mg by mouth nightly as needed.     Family History       Problem Relation (Age of Onset)    Coronary artery disease Mother, Father          Tobacco Use    Smoking status: Former     Types: Cigarettes    Smokeless tobacco: Former   Substance and Sexual Activity    Alcohol use: Yes     Comment: Occasionally    Drug use: Not Currently    Sexual activity: Not on file     Review of Systems  A 14pt ros was reviewed & is negative unless o/w documented in the hpi    Objective:     Vital Signs (Most Recent):  Temp: 97.6 °F (36.4 °C) (12/23/24 1139)  Pulse: 92 (12/23/24 1139)  Resp: 18 (12/23/24 1139)  BP: (!) 140/66 (12/23/24 1139)  SpO2: 98 % (12/23/24 1139) Vital  Signs (24h Range):  Temp:  [97.4 °F (36.3 °C)-98.7 °F (37.1 °C)] 97.6 °F (36.4 °C)  Pulse:  [] 92  Resp:  [18] 18  SpO2:  [96 %-99 %] 98 %  BP: (118-146)/(66-78) 140/66     Weight: 91.2 kg (201 lb)  Body mass index is 29.68 kg/m².     Physical Exam  Vitals reviewed.   Constitutional:       General: He is awake.      Appearance: Normal appearance.   HENT:      Head: Normocephalic and atraumatic.      Nose: Nose normal.      Mouth/Throat:      Mouth: Mucous membranes are moist.      Pharynx: Oropharynx is clear.   Eyes:      Extraocular Movements: Extraocular movements intact and EOM normal.      Pupils: Pupils are equal, round, and reactive to light.   Pulmonary:      Effort: Pulmonary effort is normal.   Musculoskeletal:         General: Normal range of motion.      Cervical back: Normal range of motion.   Skin:     General: Skin is warm and dry.   Neurological:      General: No focal deficit present.      Mental Status: He is alert and oriented to person, place, and time.      Motor: Motor strength is normal.     Coordination: Finger-Nose-Finger Test and Heel to Shin Test normal.   Psychiatric:         Mood and Affect: Mood normal.         Speech: Speech normal.         Behavior: Behavior normal. Behavior is cooperative.         Thought Content: Thought content normal.          NEUROLOGICAL EXAMINATION:     MENTAL STATUS   Oriented to person, place, and time.   Follows 3 step commands.   Attention: normal. Concentration: normal.   Speech: speech is normal   Level of consciousness: alert  Knowledge: good.   Normal comprehension.     CRANIAL NERVES     CN II   Visual fields full to confrontation.     CN III, IV, VI   Pupils are equal, round, and reactive to light.  Extraocular motions are normal.   Nystagmus: none   Ophthalmoparesis: none  Conjugate gaze: present    CN V   Facial sensation intact.     CN VII   Facial expression full, symmetric.     CN XI   CN XI normal.     MOTOR EXAM   Muscle bulk:  normal  Overall muscle tone: normal  Right arm pronator drift: absent  Left arm pronator drift: absent    Strength   Strength 5/5 throughout.     SENSORY EXAM   Light touch normal.     GAIT AND COORDINATION      Coordination   Finger to nose coordination: normal  Heel to shin coordination: normal      Significant Labs:   Recent Lab Results         12/23/24  0812        Hematocrit 23.2       Hemoglobin 7.9               Significant Imaging:  CTA head:  Impression:     1. Unremarkable CT angiogram of the head. Details and other findings as described above.        I have reviewed all pertinent imaging results/findings within the past 24 hours.  Assessment and Plan:     Migraine  Migraine cocktail ordered   Started on Depakote 500 mg p.o. daily for migraine prevention  MRI brain w/o ordered          VTE Risk Mitigation (From admission, onward)           Ordered     IP VTE LOW RISK PATIENT  Once         12/21/24 1212     Place sequential compression device  Until discontinued         12/21/24 1212                    Thank you for your consult.  Further recommendations to follow per MD Suzie Clements, [facility]-BC  Neurology  [facility] - 9th Floor Medical Telemetry

## 2024-12-24 VITALS
HEIGHT: 69 IN | TEMPERATURE: 97 F | WEIGHT: 201 LBS | RESPIRATION RATE: 18 BRPM | SYSTOLIC BLOOD PRESSURE: 130 MMHG | HEART RATE: 82 BPM | OXYGEN SATURATION: 98 % | DIASTOLIC BLOOD PRESSURE: 72 MMHG | BODY MASS INDEX: 29.77 KG/M2

## 2024-12-24 LAB
ANION GAP SERPL CALC-SCNC: 8 MEQ/L
BUN SERPL-MCNC: 11.8 MG/DL (ref 8.4–25.7)
CALCIUM SERPL-MCNC: 8.1 MG/DL (ref 8.8–10)
CHLORIDE SERPL-SCNC: 106 MMOL/L (ref 98–107)
CO2 SERPL-SCNC: 25 MMOL/L (ref 23–31)
CREAT SERPL-MCNC: 1.25 MG/DL (ref 0.72–1.25)
CREAT/UREA NIT SERPL: 9
ERYTHROCYTE [DISTWIDTH] IN BLOOD BY AUTOMATED COUNT: 15.1 % (ref 11.5–17)
GFR SERPLBLD CREATININE-BSD FMLA CKD-EPI: >60 ML/MIN/1.73/M2
GLUCOSE SERPL-MCNC: 97 MG/DL (ref 82–115)
HCT VFR BLD AUTO: 23.3 % (ref 42–52)
HGB BLD-MCNC: 7.9 G/DL (ref 14–18)
MCH RBC QN AUTO: 28.6 PG (ref 27–31)
MCHC RBC AUTO-ENTMCNC: 33.9 G/DL (ref 33–36)
MCV RBC AUTO: 84.4 FL (ref 80–94)
NRBC BLD AUTO-RTO: 0 %
PLATELET # BLD AUTO: 194 X10(3)/MCL (ref 130–400)
PMV BLD AUTO: 9.8 FL (ref 7.4–10.4)
POTASSIUM SERPL-SCNC: 3.7 MMOL/L (ref 3.5–5.1)
RBC # BLD AUTO: 2.76 X10(6)/MCL (ref 4.7–6.1)
SODIUM SERPL-SCNC: 139 MMOL/L (ref 136–145)
WBC # BLD AUTO: 4.86 X10(3)/MCL (ref 4.5–11.5)

## 2024-12-24 PROCEDURE — 80048 BASIC METABOLIC PNL TOTAL CA: CPT | Performed by: STUDENT IN AN ORGANIZED HEALTH CARE EDUCATION/TRAINING PROGRAM

## 2024-12-24 PROCEDURE — 36415 COLL VENOUS BLD VENIPUNCTURE: CPT | Performed by: STUDENT IN AN ORGANIZED HEALTH CARE EDUCATION/TRAINING PROGRAM

## 2024-12-24 PROCEDURE — 85027 COMPLETE CBC AUTOMATED: CPT | Performed by: STUDENT IN AN ORGANIZED HEALTH CARE EDUCATION/TRAINING PROGRAM

## 2024-12-24 PROCEDURE — 63600175 PHARM REV CODE 636 W HCPCS: Performed by: STUDENT IN AN ORGANIZED HEALTH CARE EDUCATION/TRAINING PROGRAM

## 2024-12-24 PROCEDURE — 25000003 PHARM REV CODE 250: Performed by: STUDENT IN AN ORGANIZED HEALTH CARE EDUCATION/TRAINING PROGRAM

## 2024-12-24 PROCEDURE — 25000003 PHARM REV CODE 250

## 2024-12-24 RX ORDER — LISINOPRIL 10 MG/1
10 TABLET ORAL DAILY
Qty: 90 TABLET | Refills: 0 | Status: SHIPPED | OUTPATIENT
Start: 2024-12-24 | End: 2025-03-24

## 2024-12-24 RX ORDER — BUTALBITAL, ACETAMINOPHEN AND CAFFEINE 50; 325; 40 MG/1; MG/1; MG/1
1 TABLET ORAL EVERY 4 HOURS PRN
Qty: 30 TABLET | Refills: 0 | Status: SHIPPED | OUTPATIENT
Start: 2024-12-24

## 2024-12-24 RX ORDER — LISINOPRIL 10 MG/1
10 TABLET ORAL DAILY
Qty: 90 TABLET | Refills: 0 | Status: SHIPPED | OUTPATIENT
Start: 2024-12-24 | End: 2024-12-24

## 2024-12-24 RX ORDER — SUCRALFATE 1 G/1
1 TABLET ORAL
Qty: 120 TABLET | Refills: 0 | Status: SHIPPED | OUTPATIENT
Start: 2024-12-24 | End: 2024-12-24

## 2024-12-24 RX ORDER — DIVALPROEX SODIUM 500 MG/1
500 TABLET, FILM COATED, EXTENDED RELEASE ORAL DAILY
Qty: 30 TABLET | Refills: 11 | Status: SHIPPED | OUTPATIENT
Start: 2024-12-24 | End: 2024-12-24

## 2024-12-24 RX ORDER — AMLODIPINE BESYLATE 10 MG/1
10 TABLET ORAL DAILY
Qty: 90 TABLET | Refills: 0 | Status: SHIPPED | OUTPATIENT
Start: 2024-12-24 | End: 2024-12-24

## 2024-12-24 RX ORDER — PANTOPRAZOLE SODIUM 40 MG/1
40 TABLET, DELAYED RELEASE ORAL DAILY
Qty: 90 TABLET | Refills: 0 | Status: SHIPPED | OUTPATIENT
Start: 2024-12-24 | End: 2025-03-24

## 2024-12-24 RX ORDER — SUCRALFATE 1 G/1
1 TABLET ORAL
Qty: 120 TABLET | Refills: 0 | Status: SHIPPED | OUTPATIENT
Start: 2024-12-24 | End: 2025-01-23

## 2024-12-24 RX ORDER — DIVALPROEX SODIUM 500 MG/1
500 TABLET, FILM COATED, EXTENDED RELEASE ORAL DAILY
Qty: 90 TABLET | Refills: 3 | Status: SHIPPED | OUTPATIENT
Start: 2024-12-24 | End: 2025-12-24

## 2024-12-24 RX ORDER — AMLODIPINE BESYLATE 10 MG/1
10 TABLET ORAL DAILY
Qty: 90 TABLET | Refills: 0 | Status: SHIPPED | OUTPATIENT
Start: 2024-12-24 | End: 2025-03-24

## 2024-12-24 RX ORDER — PANTOPRAZOLE SODIUM 40 MG/1
40 TABLET, DELAYED RELEASE ORAL DAILY
Qty: 90 TABLET | Refills: 0 | Status: SHIPPED | OUTPATIENT
Start: 2024-12-24 | End: 2024-12-24

## 2024-12-24 RX ORDER — BUTALBITAL, ACETAMINOPHEN AND CAFFEINE 50; 325; 40 MG/1; MG/1; MG/1
1 TABLET ORAL EVERY 4 HOURS PRN
Qty: 30 TABLET | Refills: 0 | Status: SHIPPED | OUTPATIENT
Start: 2024-12-24 | End: 2024-12-24

## 2024-12-24 RX ADMIN — AMLODIPINE BESYLATE 10 MG: 5 TABLET ORAL at 08:12

## 2024-12-24 RX ADMIN — BUTALBITAL, ACETAMINOPHEN, AND CAFFEINE 1 TABLET: 325; 50; 40 TABLET ORAL at 08:12

## 2024-12-24 RX ADMIN — BUTALBITAL, ACETAMINOPHEN, AND CAFFEINE 1 TABLET: 325; 50; 40 TABLET ORAL at 02:12

## 2024-12-24 RX ADMIN — SUCRALFATE 1 G: 1 TABLET ORAL at 12:12

## 2024-12-24 RX ADMIN — SUCRALFATE 1 G: 1 TABLET ORAL at 06:12

## 2024-12-24 RX ADMIN — DIVALPROEX SODIUM 500 MG: 500 TABLET, FILM COATED, EXTENDED RELEASE ORAL at 08:12

## 2024-12-24 RX ADMIN — LISINOPRIL 10 MG: 10 TABLET ORAL at 08:12

## 2024-12-24 RX ADMIN — PANTOPRAZOLE SODIUM 40 MG: 40 INJECTION, POWDER, LYOPHILIZED, FOR SOLUTION INTRAVENOUS at 08:12

## 2024-12-24 NOTE — PLAN OF CARE
12/24/24 1045   Discharge Assessment   Assessment Type Discharge Planning Assessment   Confirmed/corrected address, phone number and insurance Yes   Confirmed Demographics Correct on Facesheet   Source of Information patient   When was your last doctors appointment?   (getting new pcp)   Communicated YESSI with patient/caregiver Yes   Reason For Admission Migraine   People in Home child(jose), adult   Do you expect to return to your current living situation? Yes   Do you have help at home or someone to help you manage your care at home? Yes   Who are your caregiver(s) and their phone number(s)? Becca Denney, daughter   Prior to hospitilization cognitive status: Alert/Oriented   Current cognitive status: Alert/Oriented   Walking or Climbing Stairs Difficulty no   Dressing/Bathing Difficulty no   Equipment Currently Used at Home none   Readmission within 30 days? No   Patient currently being followed by outpatient case management? No   Do you currently have service(s) that help you manage your care at home? No   Do you take prescription medications? Yes   Do you have prescription coverage? Yes   Coverage olivia   Do you have any problems affording any of your prescribed medications? No   Is the patient taking medications as prescribed? yes   Who is going to help you get home at discharge? tbd   How do you get to doctors appointments? car, drives self   Are you on dialysis? No   Do you take coumadin? No   Discharge Plan A Home with family   Discharge Plan B Home with family   DME Needed Upon Discharge  none   Discharge Plan discussed with: Patient   Transition of Care Barriers None   Physical Activity   On average, how many days per week do you engage in moderate to strenuous exercise (like a brisk walk)? 0 days   On average, how many minutes do you engage in exercise at this level? 0 min   Housing Stability   In the last 12 months, was there a time when you were not able to pay the mortgage or rent on time? N   At any  time in the past 12 months, were you homeless or living in a shelter (including now)? N   Food Insecurity   Within the past 12 months, you worried that your food would run out before you got the money to buy more. Never true   Within the past 12 months, the food you bought just didn't last and you didn't have money to get more. Never true   Stress   Do you feel stress - tense, restless, nervous, or anxious, or unable to sleep at night because your mind is troubled all the time - these days? To some exte   Social Isolation   How often do you feel lonely or isolated from those around you?  Never   Alcohol Use   Q1: How often do you have a drink containing alcohol? Never   Utilities   In the past 12 months has the electric, gas, oil, or water company threatened to shut off services in your home? No   Health Literacy   How often do you need to have someone help you when you read instructions, pamphlets, or other written material from your doctor or pharmacy? Sometimes   OTHER   Name(s) of People in Home Becca Denney, daughter

## 2024-12-24 NOTE — PROGRESS NOTES
Sarabjit Ontiveros   MRN: [id number]   ADMISSION DATE: 2024  : 1965  AGE: 59 y.o.    DATE :  2024     PROVIDER: SENTHIL COLON    SUBJECTIVE:  Awake and alert.  No nausea or vomiting or hematemesis reported.  No overt GI blood loss.  Headache seems to be gradually improving.    Review of Systems   No overt GI bleed.  No cardiopulmonary symptoms.  Headaches gradually improving.  No fever or chills.  No abdominal pain.    Review of patient's allergies indicates:  No Known Allergies      amLODIPine  10 mg Oral Daily    [START ON 2024] divalproex ER  500 mg Oral Daily    lisinopriL  10 mg Oral Daily    pantoprazole  40 mg Intravenous BID    sucralfate  1 g Oral QID (AC & HS)       Medications Discontinued During This Encounter   Medication Reason    morphine injection 2 mg     hydrALAZINE injection 10 mg     prochlorperazine (COMPAZINE) 10 mg in 0.9% NaCl 50 mL IVPB     diphenhydrAMINE capsule 25 mg              Past Medical History:   Diagnosis Date    Chronic pain disorder     Depression     Hypertension     Stroke       Social History     Socioeconomic History    Marital status: Single   Tobacco Use    Smoking status: Former     Types: Cigarettes    Smokeless tobacco: Former   Substance and Sexual Activity    Alcohol use: Yes     Comment: Occasionally    Drug use: Not Currently     Social Drivers of Health     Financial Resource Strain: Patient Declined (2024)    Overall Financial Resource Strain (CARDIA)     Difficulty of Paying Living Expenses: Patient declined   Food Insecurity: Patient Declined (2024)    Hunger Vital Sign     Worried About Running Out of Food in the Last Year: Patient declined     Ran Out of Food in the Last Year: Patient declined   Transportation Needs: Patient Declined (2024)    TRANSPORTATION NEEDS     Transportation : Patient declined   Physical Activity: Unknown (10/2/2022)    Exercise Vital Sign     Days of Exercise per Week: Patient declined     Minutes  "of Exercise per Session: Patient declined   Stress: Patient Declined (12/21/2024)    [employer] [place] [employer] - Occupational Stress Questionnaire     Feeling of Stress : Patient declined   Housing Stability: Patient Declined (12/21/2024)    Housing Stability Vital Sign     Unable to Pay for Housing in the Last Year: Patient declined     Homeless in the Last Year: Patient declined      Past Surgical History:   Procedure Laterality Date    COLONOSCOPY N/A 8/24/2023    Procedure: COLONOSCOPY;  Surgeon: Nadir Gregg MD;  Location: [facility];  Service: Endoscopy;  Laterality: N/A;    ESOPHAGOGASTRODUODENOSCOPY N/A 12/22/2024    Procedure: EGD (ESOPHAGOGASTRODUODENOSCOPY);  Surgeon: Aaron Jones MD;  Location: [facility] OR;  Service: Gastroenterology;  Laterality: N/A;    ROBOT-ASSISTED REPAIR OF UMBILICAL HERNIA USING DA NEIL XI N/A 8/28/2023    Procedure: XI ROBOTIC REPAIR, HERNIA, UMBILICAL;  Surgeon: Nadir Gregg MD;  Location: [facility] OR;  Service: General;  Laterality: N/A;  with mesh        OBJECTIVE:     Vitals:    12/23/24 0403 12/23/24 0758 12/23/24 1139 12/23/24 1529   BP:  (!) 146/78 (!) 140/66 (!) 142/73   BP Location:   Right arm    Patient Position:  Lying Lying    Pulse: 72 91 92 81   Resp:  18 18    Temp:  97.4 °F (36.3 °C) 97.6 °F (36.4 °C) 97.4 °F (36.3 °C)   TempSrc:  Oral Oral Oral   SpO2:  97% 98% 100%   Weight:       Height:           Physical Exam       LABS    Recent Labs   Lab 12/21/24  0142 12/21/24  1453 12/22/24  0535 12/23/24  0812   WBC 10.35  --  5.82  --    HGB 8.6* 9.3* 7.3* 7.9*   HCT 25.9* 26.5* 21.1* 23.2*     --  171  --       Recent Labs   Lab 12/21/24  0142 12/22/24  0535   * 141   K 4.3 3.8    112*   CO2 22* 21*   BUN 59.3* 25.7   CREATININE 1.10 1.17   CALCIUM 8.2* 8.3*   BILITOT 0.1 0.2   ALKPHOS 74 75   ALT 18 19   AST 14 19   GLUCOSE 180* 128*    No results for input(s): "INR" in the last 168 hours. No results for input(s): "AMYLASE" in " "the last 168 hours. No results for input(s): "APTT", "INR", "PTT" in the last 168 hours.        RESULTS: X-Ray Chest AP Portable    Result Date: 12/21/2024  EXAMINATION: XR CHEST AP PORTABLE CLINICAL HISTORY: Chest Pain; TECHNIQUE: Single frontal view of the chest was performed. COMPARISON: 02/08/2024 FINDINGS: No infiltrates are seen.  Heart size is within normal limits.  Costophrenic angles are clear.  Film is lordotic in position.     No acute disease is seen Electronically signed by: Erich Campbell MD Date:    12/21/2024 Time:    08:06    CTA Brain    Result Date: 12/21/2024  EXAMINATION: CTA HEAD CLINICAL HISTORY: Cerebral aneurysm, follow-up; TECHNIQUE: Non contrast low dose axial images were obtained thought the head. CT angiogram was performed from the level of the bottom of C2 to the top of the head following the IV administration of 100mL of Omnipaque 350.   Sagittal and coronal reconstructions and maximum intensity projection reconstructions were performed. Automatic exposure control (AEC) was utilized for dose reduction. Dose: 552 mGycm COMPARISON: 02/08/2024 FINDINGS: Intracranial Vascular structures:Internal carotid arteries: Mild atheromatous calcification of the cavernous clinoid segment of the bilateral internal carotid artery is seen without significant stenosis.Middle cerebral arteries: Unremarkable.Anterior cerebral arteries: Unremarkable.Vertebral arteries: Unremarkable.Basilar artery: Unremarkable.Posterior cerebral arteries: Unremarkable.Posterior communicating arteries: Bilateral posterior communicating artery visualized.Jugular Veins and venous sinuses: Unremarkable.Brain parenchyma: No abnormal intracranial enhancement is seen on the post contrast images.     1. Unremarkable CT angiogram of the head. Details and other findings as described above. Electronically signed by: Erich Campbell MD Date:    12/21/2024 Time:    07:15             ICD-10-CM ICD-9-CM   1. Excessive use of nonsteroidal " anti-inflammatory drugs (NSAIDs)  F19.90 305.90   2. Upper gastrointestinal bleed  K92.2 578.9   3. Chest pain  R07.9 786.50        DATE OF PROCEDURE:  12/22/2024   PROCEDURE:  EGD with cautery/Endoclipping  SURGEON: SENTHIL COLON  PREOPERATIVE DIAGNOSIS:  Acute blood loss anemia, history of dark stool  POSTOPERATIVE DIAGNOSIS:  1. Probable Ly-Brown tear with visible vessel, GE junction.  Cautery/Endoclip x1 was done.  2. Duodenal bulb polypoid lesion, smooth surface, approximately 1 cm.  Not biopsied.  3. Otherwise normal exam    ASSESSMENT & PLAN:    Sarabjit Ontiveros is a 59 y.o. male who  has a past medical history of Chronic pain disorder, Depression, Hypertension, and Stroke.. The patient presented to [facility] on 12/21/2024 with a primary complaint of melena for 2 days along with worsening headache.  Patient does have history of cerebral aneurysm along with migraines.  Patient never has a history of GI bleed before.  Also has some epigastric discomfort.  Takes BC powder for his headaches and on occasion takes his many 5-8 per day.  Patient also has been noting lethargy and worsening generalized weakness.  Denies any over-the-counter NSAID use.  Denies any other blood thinner use.  Has had history of colonoscopy done last year which showed diverticular disease of sigmoid grade 1-2 hemorrhoids.  No other significant findings  Hemoglobin on arrival was noted to be at 8.6, drop from previous 15.  He was given 2 units of PRBC and transferred to us for further care     Recommendations:   1. NPO for now.  2. IV ppi.  Serial hemoglobin/hematocrit.  Transfuse if hemoglobin less than 8.   3. Plan for upper endoscopy on Sunday morning.  Procedure has been explained to the patient.  He agrees and would like to proceed.    4. Need to refrain from NSAIDs.  He could have peptic ulcer disease, esophagitis, gastritis, Dieulafoy's lesion or arteriovenous malformations etc..    12/23/24  Patient seems to be doing well from GI  standpoint.  No overt GI bleed.  Continue PPI along Carafate.  Diet as tolerated.  Follow up hemoglobin/hematocrit. Avoid NSAIDs or anticoagulation

## 2024-12-24 NOTE — PROGRESS NOTES
Sarabjit Ontiveros   MRN: [id number]   ADMISSION DATE: 2024  : 1965  AGE: 59 y.o.    DATE :  2024     PROVIDER: SENTHIL COLON    SUBJECTIVE:  Awake and alert.  No nausea or vomiting or hematemesis reported.  No overt GI blood loss.  Headache seems to be gradually improving.    Review of Systems   No overt GI bleed.  No cardiopulmonary symptoms.  Headaches gradually improving.  No fever or chills.  No abdominal pain.    Review of patient's allergies indicates:  No Known Allergies      amLODIPine  10 mg Oral Daily    divalproex ER  500 mg Oral Daily    lisinopriL  10 mg Oral Daily    pantoprazole  40 mg Intravenous BID    sucralfate  1 g Oral QID (AC & HS)       Medications Discontinued During This Encounter   Medication Reason    morphine injection 2 mg     hydrALAZINE injection 10 mg     prochlorperazine (COMPAZINE) 10 mg in 0.9% NaCl 50 mL IVPB     diphenhydrAMINE capsule 25 mg     amLODIPine (NORVASC) 10 MG tablet Stop Taking at Discharge    lisinopriL 10 MG tablet Stop Taking at Discharge    bisoproloL-hydrochlorothiazide (ZIAC) 10-6.25 mg per tablet Stop Taking at Discharge    bisoproloL-hydrochlorothiazide (ZIAC) 10-6.25 mg per tablet Stop Taking at Discharge    amLODIPine (NORVASC) 10 MG tablet     lisinopriL 10 MG tablet     butalbital-acetaminophen-caffeine -40 mg (FIORICET, ESGIC) -40 mg per tablet              Past Medical History:   Diagnosis Date    Chronic pain disorder     Depression     Hypertension     Stroke       Social History     Socioeconomic History    Marital status: Single   Tobacco Use    Smoking status: Former     Types: Cigarettes    Smokeless tobacco: Former   Substance and Sexual Activity    Alcohol use: Yes     Comment: Occasionally    Drug use: Not Currently     Social Drivers of Health     Financial Resource Strain: Patient Declined (2024)    Overall Financial Resource Strain (CARDIA)     Difficulty of Paying Living Expenses: Patient declined   Food  Insecurity: Patient Declined (12/21/2024)    Hunger Vital Sign     Worried About Running Out of Food in the Last Year: Patient declined     Ran Out of Food in the Last Year: Patient declined   Transportation Needs: Patient Declined (12/21/2024)    TRANSPORTATION NEEDS     Transportation : Patient declined   Physical Activity: Unknown (10/2/2022)    Exercise Vital Sign     Days of Exercise per Week: Patient declined     Minutes of Exercise per Session: Patient declined   Stress: Patient Declined (12/21/2024)    [employer] [place] [employer] - Occupational Stress Questionnaire     Feeling of Stress : Patient declined   Housing Stability: Patient Declined (12/21/2024)    Housing Stability Vital Sign     Unable to Pay for Housing in the Last Year: Patient declined     Homeless in the Last Year: Patient declined      Past Surgical History:   Procedure Laterality Date    COLONOSCOPY N/A 8/24/2023    Procedure: COLONOSCOPY;  Surgeon: Nadir Gregg MD;  Location: [facility];  Service: Endoscopy;  Laterality: N/A;    ESOPHAGOGASTRODUODENOSCOPY N/A 12/22/2024    Procedure: EGD (ESOPHAGOGASTRODUODENOSCOPY);  Surgeon: Aaron Jones MD;  Location: [facility];  Service: Gastroenterology;  Laterality: N/A;    ROBOT-ASSISTED REPAIR OF UMBILICAL HERNIA USING DA NEIL XI N/A 8/28/2023    Procedure: XI ROBOTIC REPAIR, HERNIA, UMBILICAL;  Surgeon: Nadir Gregg MD;  Location: [facility];  Service: General;  Laterality: N/A;  with mesh        OBJECTIVE:     Vitals:    12/23/24 1529 12/23/24 1938 12/23/24 2305 12/24/24 0729   BP: (!) 142/73 (!) 152/83 (!) 145/77 134/82   BP Location:       Patient Position:    Lying   Pulse: 81 87 86 80   Resp:    18   Temp: 97.4 °F (36.3 °C) 97.9 °F (36.6 °C) 98.1 °F (36.7 °C) 97.6 °F (36.4 °C)   TempSrc: Oral Oral Oral Oral   SpO2: 100% 98% 98% 96%   Weight:       Height:           Physical Exam       LABS    Recent Labs   Lab 12/21/24  0142 12/21/24  1453 12/22/24  0535 12/23/24  0812  "12/24/24  0347   WBC 10.35  --  5.82  --  4.86   HGB 8.6*   < > 7.3* 7.9* 7.9*   HCT 25.9*   < > 21.1* 23.2* 23.3*     --  171  --  194    < > = values in this interval not displayed.      Recent Labs   Lab 12/21/24  0142 12/22/24  0535 12/24/24 0347   * 141 139   K 4.3 3.8 3.7    112* 106   CO2 22* 21* 25   BUN 59.3* 25.7 11.8   CREATININE 1.10 1.17 1.25   CALCIUM 8.2* 8.3* 8.1*   BILITOT 0.1 0.2  --    ALKPHOS 74 75  --    ALT 18 19  --    AST 14 19  --    GLUCOSE 180* 128* 97    No results for input(s): "INR" in the last 168 hours. No results for input(s): "AMYLASE" in the last 168 hours. No results for input(s): "APTT", "INR", "PTT" in the last 168 hours.        RESULTS: X-Ray Chest AP Portable    Result Date: 12/21/2024  EXAMINATION: XR CHEST AP PORTABLE CLINICAL HISTORY: Chest Pain; TECHNIQUE: Single frontal view of the chest was performed. COMPARISON: 02/08/2024 FINDINGS: No infiltrates are seen.  Heart size is within normal limits.  Costophrenic angles are clear.  Film is lordotic in position.     No acute disease is seen Electronically signed by: Erich Campbell MD Date:    12/21/2024 Time:    08:06    CTA Brain    Result Date: 12/21/2024  EXAMINATION: CTA HEAD CLINICAL HISTORY: Cerebral aneurysm, follow-up; TECHNIQUE: Non contrast low dose axial images were obtained thought the head. CT angiogram was performed from the level of the bottom of C2 to the top of the head following the IV administration of 100mL of Omnipaque 350.   Sagittal and coronal reconstructions and maximum intensity projection reconstructions were performed. Automatic exposure control (AEC) was utilized for dose reduction. Dose: 552 mGycm COMPARISON: 02/08/2024 FINDINGS: Intracranial Vascular structures:Internal carotid arteries: Mild atheromatous calcification of the cavernous clinoid segment of the bilateral internal carotid artery is seen without significant stenosis.Middle cerebral arteries: Unremarkable.Anterior " cerebral arteries: Unremarkable.Vertebral arteries: Unremarkable.Basilar artery: Unremarkable.Posterior cerebral arteries: Unremarkable.Posterior communicating arteries: Bilateral posterior communicating artery visualized.Jugular Veins and venous sinuses: Unremarkable.Brain parenchyma: No abnormal intracranial enhancement is seen on the post contrast images.     1. Unremarkable CT angiogram of the head. Details and other findings as described above. Electronically signed by: Erich Campbell MD Date:    12/21/2024 Time:    07:15             ICD-10-CM ICD-9-CM   1. Upper gastrointestinal bleed  K92.2 578.9   2. Chest pain  R07.9 786.50   3. Excessive use of nonsteroidal anti-inflammatory drugs (NSAIDs)  F19.90 305.90        DATE OF PROCEDURE:  12/22/2024   PROCEDURE:  EGD with cautery/Endoclipping  SURGEON: SENTHIL COLON  PREOPERATIVE DIAGNOSIS:  Acute blood loss anemia, history of dark stool  POSTOPERATIVE DIAGNOSIS:  1. Probable Ly-Brown tear with visible vessel, GE junction.  Cautery/Endoclip x1 was done.  2. Duodenal bulb polypoid lesion, smooth surface, approximately 1 cm.  Not biopsied.  3. Otherwise normal exam    ASSESSMENT & PLAN:    Sarabjit Ontiveros is a 59 y.o. male who  has a past medical history of Chronic pain disorder, Depression, Hypertension, and Stroke.. The patient presented to [facility] on 12/21/2024 with a primary complaint of melena for 2 days along with worsening headache.  Patient does have history of cerebral aneurysm along with migraines.  Patient never has a history of GI bleed before.  Also has some epigastric discomfort.  Takes BC powder for his headaches and on occasion takes his many 5-8 per day.  Patient also has been noting lethargy and worsening generalized weakness.  Denies any over-the-counter NSAID use.  Denies any other blood thinner use.  Has had history of colonoscopy done last year which showed diverticular disease of sigmoid grade 1-2 hemorrhoids.  No other significant findings   Hemoglobin on arrival was noted to be at 8.6, drop from previous 15.  He was given 2 units of PRBC and transferred to us for further care     Recommendations:   1. NPO for now.  2. IV ppi.  Serial hemoglobin/hematocrit.  Transfuse if hemoglobin less than 8.   3. Plan for upper endoscopy on Sunday morning.  Procedure has been explained to the patient.  He agrees and would like to proceed.    4. Need to refrain from NSAIDs.  He could have peptic ulcer disease, esophagitis, gastritis, Dieulafoy's lesion or arteriovenous malformations etc..    12/23/24  Patient seems to be doing well from GI standpoint.  No overt GI bleed.  Continue PPI along Carafate.  Diet as tolerated.  Follow up hemoglobin/hematocrit. Avoid NSAIDs or anticoagulation    12/24/24  Patient seems to be doing well from GI standpoint.  No nausea or vomiting.  No abdominal pain.  Significant improvement of headache.  Continue PPI along with Carafate.  Avoid NSAIDs.  No additional GI recommendations at this time.  We will sign off.  Thank you for the consult.

## 2024-12-24 NOTE — DISCHARGE SUMMARY
[facility] Hospital Medicine Discharge Summary    Admit Date: 12/21/2024  Discharge Date and Time: 12/24/202410:40 AM  Admitting Physician:  Team  Discharging Physician: Rafaela Kc DO.  Primary Care Physician: Arjun Bennett MD  Consults: Gastroenterology and Neurology    Discharge Diagnoses:  Acute GI bleed, suspect upper   Acute blood loss anemia, secondary to above   Hypertensive urgency wound  History of migraines, cerebral aneurysm, CVA, depression, chronic pain disorder  Medication Noncompliance    Hospital Course:   Sarabjit Ontiveros is a 59 y.o. male who has a past medical history of Chronic pain disorder, Depression, Hypertension, and Stroke.. The patient presented to [facility] on 12/21/2024 with a primary complaint of melena for 2 days along with worsening headache. Patient does have history of cerebral aneurysm along with migraines. Patient never has a history of GI bleed before. Also has some epigastric discomfort. Takes BC powder for his headaches and on occasion takes his many 5-8 per day. Patient also has been noting lethargy and worsening generalized weakness. Denies any over-the-counter NSAID use. Denies any other blood thinner use. Has had history of colonoscopy done last year which showed diverticular disease of sigmoid grade 1-2 hemorrhoids. No other significant findings Hemoglobin on arrival was noted to be at 8.6, drop from previous 15. He was given 2 units of PRBC and transferred to us for further care   Patient follows with Neurosurgery outpatient for his history of cerebral aneurysm  CTA of the head was done at outlying facility which was unremarkable  GI following, EGD done which showed normal mucosa along with a Ly-Brown tear with possible visible vessel which was cauterized.  An Endoclip was paced with good hemostasis  Status post 2 units of PRBC given, initial improvement to 9.3 however decreased back to 7.3 and was given a unit of PRBC on  12/22  Hemoglobin remained stable.  We will continue Protonix daily at discharge.   Neurology was consulted for persistent migraines and headaches and was started on Depakote   MRI was also done which showed acute intracranial findings and left occipital lobe cavernous malformation which is chronic.    Advised patient on compliance on medications including his blood pressure medication probably driving his migraines worsening outpatient due to noncompliance.  Patient agreeable.  Also advised to avoid any NSAIDs.  We will prescribe him some Fioricet did take p.r.n. for headaches and to continue his Depakote.  I will also make a neurology referral at discharge to follow up with for his migraines.  Gave him 90 day supplies for all of his medication until patient follows up with his PCP.  Patient denied any further concerns at this time.  Headache improved.  Tolerating diet.  Denied any further concerns at this time.    Pt was seen and examined on the day of discharge  Vitals:  VITAL SIGNS: 24 HRS MIN & MAX LAST   Temp  Min: 97.4 °F (36.3 °C)  Max: 98.1 °F (36.7 °C) 97.6 °F (36.4 °C)   BP  Min: 134/82  Max: 152/83 134/82   Pulse  Min: 80  Max: 92  80   Resp  Min: 18  Max: 18 18   SpO2  Min: 96 %  Max: 100 % 96 %       Physical Exam:  General: In no acute distress, afebrile  Chest: Clear to auscultation bilaterally  Heart: RRR, +S1, S2, no appreciable murmur  Abdomen: Soft, nontender, BS +  MSK: Warm, no lower extremity edema, no clubbing or cyanosis  Neurologic: Alert and oriented x4, Cranial nerve II-XII intact, Strength 5/5 in all 4 extremities    Procedures Performed: No admission procedures for hospital encounter.     Significant Diagnostic Studies: See Full reports for all details    Recent Labs   Lab 12/21/24  0142 12/21/24  1453 12/22/24  0535 12/23/24  0812 12/24/24  0347   WBC 10.35  --  5.82  --  4.86   RBC 3.10*  --  2.56*  --  2.76*   HGB 8.6*   < > 7.3* 7.9* 7.9*   HCT 25.9*   < > 21.1* 23.2* 23.3*   MCV  83.5  --  82.4  --  84.4   MCH 27.7  --  28.5  --  28.6   MCHC 33.2  --  34.6  --  33.9   RDW 13.0  --  14.9  --  15.1     --  171  --  194   MPV 9.6  --  9.5  --  9.8    < > = values in this interval not displayed.       Recent Labs   Lab 12/21/24  0142 12/22/24  0535 12/24/24  0347   * 141 139   K 4.3 3.8 3.7    112* 106   CO2 22* 21* 25   BUN 59.3* 25.7 11.8   CREATININE 1.10 1.17 1.25   CALCIUM 8.2* 8.3* 8.1*   MG 1.70  --   --    ALBUMIN 2.9* 3.0*  --    ALKPHOS 74 75  --    ALT 18 19  --    AST 14 19  --    BILITOT 0.1 0.2  --         Microbiology Results (last 7 days)       ** No results found for the last 168 hours. **             MRI Brain Without Contrast  Narrative: EXAMINATION:  MRI BRAIN WITHOUT CONTRAST    CLINICAL HISTORY:  Headache, new or worsening (Age >= 50y);    TECHNIQUE:  Multiplanar MRI sequences were performed of the brain without contrast.    COMPARISON:  CT brain without contrast February 8, 2024 and January 28, 2021    FINDINGS:  Chronic microvascular ischemic changes are mild with periventricular and few deep white matter T2 FLAIR hyperintense signals.  Generalized cerebral cortical volume loss.  There is left occipital lobe gradient echo sequence 1.2 cm susceptibility artifact.  There is shows peripheral T2 weighted hypointensity and central aspect both T1 weighted T2 weighted hyperintense component.  There is favored to represent cavernous malformation.  There is correspond stable hyperdensity in this location on prior CT studies.    There is no diffusion restriction or ADC map signal drop out to suggest acute infarct. The sella and suprasellar areas are unremarkable.    The cerebellar tonsils are normally positioned. There is no acute intracranial hemorrhage, hydrocephalus, midline shift or mass effect. No acute extra axial fluid collections identified. The mastoid air cells are clear.  Impression: 1.  No acute intracranial findings identified.    2.  Left occipital  lobe cavernous malformation and mild chronic microvascular ischemia.    Electronically signed by: Aris Linda  Date:    12/23/2024  Time:    23:33         Medication List        START taking these medications      divalproex  MG Tb24 24 hr tablet  Commonly known as: DEPAKOTE ER  Take 1 tablet (500 mg total) by mouth once daily.     pantoprazole 40 MG tablet  Commonly known as: PROTONIX  Take 1 tablet (40 mg total) by mouth once daily.     sucralfate 1 gram tablet  Commonly known as: CARAFATE  Take 1 tablet (1 g total) by mouth 4 (four) times daily before meals and nightly.            CHANGE how you take these medications      amLODIPine 10 MG tablet  Commonly known as: NORVASC  Take 1 tablet (10 mg total) by mouth once daily.  What changed: Another medication with the same name was removed. Continue taking this medication, and follow the directions you see here.     lisinopriL 10 MG tablet  Take 1 tablet (10 mg total) by mouth once daily.  What changed: Another medication with the same name was removed. Continue taking this medication, and follow the directions you see here.            CONTINUE taking these medications      acetaminophen 325 MG tablet  Commonly known as: TYLENOL  Take 2 tablets (650 mg total) by mouth every 8 (eight) hours as needed.     butalbital-acetaminophen-caffeine -40 mg -40 mg per tablet  Commonly known as: FIORICET, ESGIC  Take 1 tablet by mouth every 4 (four) hours as needed for Headaches.     ondansetron 4 MG tablet  Commonly known as: ZOFRAN  Take 1 tablet (4 mg total) by mouth every 6 (six) hours.     tiZANidine 4 MG tablet  Commonly known as: ZANAFLEX            STOP taking these medications      bisoproloL-hydrochlorothiazide 10-6.25 mg per tablet  Commonly known as: ZIAC               Where to Get Your Medications        These medications were sent to [facility] - [place], [place] - [address]  [address], [place] [address]       Phone: [phone]   amLODIPine 10 MG tablet  butalbital-acetaminophen-caffeine -40 mg -40 mg per tablet  divalproex  MG Tb24 24 hr tablet  lisinopriL 10 MG tablet  pantoprazole 40 MG tablet  sucralfate 1 gram tablet          Explained in detail to the patient about the discharge plan, medications, and follow-up visits. Pt understands and agrees with the treatment plan  Discharge Disposition: Home  Discharged Condition: stable  Diet-   Dietary Orders (From admission, onward)       Start     Ordered    12/22/24 1858  Diet Adult Regular  Diet effective now         12/22/24 1858                   Medications Per DC med rec  Activities as tolerated    For further questions contact hospitalist office    Discharge time 33 minutes    For worsening symptoms, chest pain, shortness of breath, increased abdominal pain, high grade fever, stroke or stroke like symptoms, immediately go to the nearest Emergency Room or call 911 as soon as possible.      Rafaela Kc DO  Department of Hospital Medicine  [facility]  12/24/2024

## 2025-04-04 ENCOUNTER — HOSPITAL ENCOUNTER (EMERGENCY)
Facility: HOSPITAL | Age: 60
Discharge: HOME | End: 2025-04-04
Attending: EMERGENCY MEDICINE
Payer: MEDICAID

## 2025-04-04 VITALS
OXYGEN SATURATION: 96 % | SYSTOLIC BLOOD PRESSURE: 163 MMHG | HEIGHT: 69 IN | TEMPERATURE: 99 F | RESPIRATION RATE: 18 BRPM | WEIGHT: 210 LBS | BODY MASS INDEX: 31.1 KG/M2 | HEART RATE: 83 BPM | DIASTOLIC BLOOD PRESSURE: 95 MMHG

## 2025-04-04 DIAGNOSIS — I10 PRIMARY HYPERTENSION: Primary | ICD-10-CM

## 2025-04-04 LAB
ANION GAP SERPL CALC-SCNC: 7 MEQ/L
ANISOCYTOSIS BLD QL SMEAR: ABNORMAL
BASOPHILS # BLD AUTO: 0.03 X10(3)/MCL
BASOPHILS NFR BLD AUTO: 0.6 %
BUN SERPL-MCNC: 19.7 MG/DL (ref 8.4–25.7)
CALCIUM SERPL-MCNC: 9.1 MG/DL (ref 8.8–10)
CHLORIDE SERPL-SCNC: 104 MMOL/L (ref 98–107)
CO2 SERPL-SCNC: 25 MMOL/L (ref 23–31)
CREAT SERPL-MCNC: 1.12 MG/DL (ref 0.72–1.25)
CREAT/UREA NIT SERPL: 18
EOSINOPHIL # BLD AUTO: 0.13 X10(3)/MCL (ref 0–0.9)
EOSINOPHIL NFR BLD AUTO: 2.5 %
ERYTHROCYTE [DISTWIDTH] IN BLOOD BY AUTOMATED COUNT: 18.6 % (ref 11.5–17)
GFR SERPLBLD CREATININE-BSD FMLA CKD-EPI: >60 ML/MIN/1.73/M2
GLUCOSE SERPL-MCNC: 127 MG/DL (ref 82–115)
HCT VFR BLD AUTO: 37.6 % (ref 42–52)
HGB BLD-MCNC: 11.7 G/DL (ref 14–18)
HYPOCHROMIA BLD QL SMEAR: ABNORMAL
IMM GRANULOCYTES # BLD AUTO: 0.01 X10(3)/MCL (ref 0–0.04)
IMM GRANULOCYTES NFR BLD AUTO: 0.2 %
LYMPHOCYTES # BLD AUTO: 1.04 X10(3)/MCL (ref 0.6–4.6)
LYMPHOCYTES NFR BLD AUTO: 19.9 %
MCH RBC QN AUTO: 21 PG (ref 27–31)
MCHC RBC AUTO-ENTMCNC: 31.1 G/DL (ref 33–36)
MCV RBC AUTO: 67.6 FL (ref 80–94)
MICROCYTES BLD QL SMEAR: ABNORMAL
MONOCYTES # BLD AUTO: 0.51 X10(3)/MCL (ref 0.1–1.3)
MONOCYTES NFR BLD AUTO: 9.8 %
NEUTROPHILS # BLD AUTO: 3.5 X10(3)/MCL (ref 2.1–9.2)
NEUTROPHILS NFR BLD AUTO: 67 %
PLATELET # BLD AUTO: 208 X10(3)/MCL (ref 130–400)
PLATELET # BLD EST: ADEQUATE 10*3/UL
PMV BLD AUTO: 8.5 FL (ref 7.4–10.4)
POTASSIUM SERPL-SCNC: 3.9 MMOL/L (ref 3.5–5.1)
RBC # BLD AUTO: 5.56 X10(6)/MCL (ref 4.7–6.1)
RBC MORPH BLD: ABNORMAL
SODIUM SERPL-SCNC: 136 MMOL/L (ref 136–145)
WBC # BLD AUTO: 5.22 X10(3)/MCL (ref 4.5–11.5)

## 2025-04-04 PROCEDURE — 99285 EMERGENCY DEPT VISIT HI MDM: CPT | Mod: 25

## 2025-04-04 PROCEDURE — 96374 THER/PROPH/DIAG INJ IV PUSH: CPT

## 2025-04-04 PROCEDURE — 63600175 PHARM REV CODE 636 W HCPCS: Performed by: EMERGENCY MEDICINE

## 2025-04-04 PROCEDURE — 25000003 PHARM REV CODE 250: Performed by: EMERGENCY MEDICINE

## 2025-04-04 PROCEDURE — 80048 BASIC METABOLIC PNL TOTAL CA: CPT | Performed by: EMERGENCY MEDICINE

## 2025-04-04 PROCEDURE — 85025 COMPLETE CBC W/AUTO DIFF WBC: CPT | Performed by: EMERGENCY MEDICINE

## 2025-04-04 RX ORDER — ONDANSETRON 4 MG/1
4 TABLET, ORALLY DISINTEGRATING ORAL
Status: COMPLETED | OUTPATIENT
Start: 2025-04-04 | End: 2025-04-04

## 2025-04-04 RX ORDER — HYDRALAZINE HYDROCHLORIDE 20 MG/ML
10 INJECTION INTRAMUSCULAR; INTRAVENOUS
Status: DISCONTINUED | OUTPATIENT
Start: 2025-04-04 | End: 2025-04-04

## 2025-04-04 RX ORDER — BUTALBITAL, ACETAMINOPHEN AND CAFFEINE 50; 325; 40 MG/1; MG/1; MG/1
1 TABLET ORAL EVERY 4 HOURS PRN
Qty: 30 TABLET | Refills: 0 | Status: SHIPPED | OUTPATIENT
Start: 2025-04-04 | End: 2025-04-04 | Stop reason: CLARIF

## 2025-04-04 RX ORDER — HYDROMORPHONE HYDROCHLORIDE 2 MG/ML
1 INJECTION, SOLUTION INTRAMUSCULAR; INTRAVENOUS; SUBCUTANEOUS
Refills: 0 | Status: COMPLETED | OUTPATIENT
Start: 2025-04-04 | End: 2025-04-04

## 2025-04-04 RX ORDER — CLONIDINE HYDROCHLORIDE 0.2 MG/1
0.2 TABLET ORAL
Status: COMPLETED | OUTPATIENT
Start: 2025-04-04 | End: 2025-04-04

## 2025-04-04 RX ORDER — LISINOPRIL 10 MG/1
10 TABLET ORAL
Status: COMPLETED | OUTPATIENT
Start: 2025-04-04 | End: 2025-04-04

## 2025-04-04 RX ORDER — AMLODIPINE BESYLATE 10 MG/1
10 TABLET ORAL DAILY
Qty: 30 TABLET | Refills: 1 | Status: SHIPPED | OUTPATIENT
Start: 2025-04-04 | End: 2025-06-03

## 2025-04-04 RX ORDER — HYDROCODONE BITARTRATE AND ACETAMINOPHEN 7.5; 325 MG/1; MG/1
1 TABLET ORAL EVERY 6 HOURS PRN
Qty: 12 TABLET | Refills: 0 | Status: SHIPPED | OUTPATIENT
Start: 2025-04-04

## 2025-04-04 RX ORDER — DIVALPROEX SODIUM 500 MG/1
500 TABLET, FILM COATED, EXTENDED RELEASE ORAL DAILY
Qty: 30 TABLET | Refills: 1 | Status: SHIPPED | OUTPATIENT
Start: 2025-04-04 | End: 2025-06-03

## 2025-04-04 RX ORDER — LISINOPRIL 10 MG/1
10 TABLET ORAL DAILY
Qty: 30 TABLET | Refills: 1 | Status: SHIPPED | OUTPATIENT
Start: 2025-04-04 | End: 2025-06-03

## 2025-04-04 RX ORDER — AMLODIPINE BESYLATE 5 MG/1
10 TABLET ORAL
Status: COMPLETED | OUTPATIENT
Start: 2025-04-04 | End: 2025-04-04

## 2025-04-04 RX ADMIN — CLONIDINE HYDROCHLORIDE 0.2 MG: 0.2 TABLET ORAL at 02:04

## 2025-04-04 RX ADMIN — HYDROMORPHONE HYDROCHLORIDE 1 MG: 2 INJECTION, SOLUTION INTRAMUSCULAR; INTRAVENOUS; SUBCUTANEOUS at 03:04

## 2025-04-04 RX ADMIN — LISINOPRIL 10 MG: 10 TABLET ORAL at 12:04

## 2025-04-04 RX ADMIN — ONDANSETRON 4 MG: 4 TABLET, ORALLY DISINTEGRATING ORAL at 02:04

## 2025-04-04 RX ADMIN — AMLODIPINE BESYLATE 10 MG: 5 TABLET ORAL at 12:04

## 2025-04-04 NOTE — ED NOTES
Pt reports recurrence of HA at d/c that is throbbing, rates 8/10. MD notified. Will hold d/c until it resolves.

## 2025-04-04 NOTE — ED PROVIDER NOTES
Encounter Date: 4/4/2025       History     Chief Complaint   Patient presents with    Headache     Pt with headache x 3 days --states he has been out of his blood pressure meds x 3 weeks -also has a hx of brain aneurysm and waiting to see Neuro      This 59-year-old gentleman with history of hypertension and CVA presents with a headache for the past 3 days and elevated blood pressure.  He reports that he has been out of his blood pressure medication for several weeks.  He does have a history of a possible brain aneurysm in his waiting to see neuro.  He does not complain of any new neurologic symptoms at present.       Review of patient's allergies indicates:  No Known Allergies  Past Medical History:   Diagnosis Date    Chronic pain disorder     Depression     Hypertension     Stroke      Past Surgical History:   Procedure Laterality Date    COLONOSCOPY N/A 8/24/2023    Procedure: COLONOSCOPY;  Surgeon: Nadir Gregg MD;  Location: [facility];  Service: Endoscopy;  Laterality: N/A;    ESOPHAGOGASTRODUODENOSCOPY N/A 12/22/2024    Procedure: EGD (ESOPHAGOGASTRODUODENOSCOPY);  Surgeon: Aaron Jones MD;  Location: [facility];  Service: Gastroenterology;  Laterality: N/A;    ROBOT-ASSISTED REPAIR OF UMBILICAL HERNIA USING DA NEIL XI N/A 8/28/2023    Procedure: XI ROBOTIC REPAIR, HERNIA, UMBILICAL;  Surgeon: Nadir Gregg MD;  Location: [facility];  Service: General;  Laterality: N/A;  with mesh     Family History   Problem Relation Name Age of Onset    Coronary artery disease Mother      Coronary artery disease Father       Social History[1]  Review of Systems   Constitutional:  Negative for fever.   HENT:  Negative for sore throat.    Respiratory:  Negative for shortness of breath.    Cardiovascular:  Negative for chest pain.   Gastrointestinal:  Negative for nausea.   Genitourinary:  Negative for dysuria.   Musculoskeletal:  Negative for back pain.   Skin:  Negative for rash.   Neurological:  Positive for  headaches. Negative for weakness.   Hematological:  Does not bruise/bleed easily.       Physical Exam     Initial Vitals [04/04/25 1152]   BP Pulse Resp Temp SpO2   (!) 205/108 106 20 98.6 °F (37 °C) 99 %      MAP       --         Physical Exam    Nursing note and vitals reviewed.  Constitutional: He appears well-developed and well-nourished.   HENT:   Head: Normocephalic and atraumatic. Mouth/Throat: Mucous membranes are normal.   Eyes: EOM are normal. Pupils are equal, round, and reactive to light.   Neck: Neck supple.   Normal range of motion.  Cardiovascular:  Normal rate, regular rhythm, normal heart sounds and intact distal pulses.           Pulmonary/Chest: Breath sounds normal.   Abdominal: Abdomen is soft. Bowel sounds are normal.   Musculoskeletal:         General: Normal range of motion.      Cervical back: Normal range of motion and neck supple.     Neurological: He is alert and oriented to person, place, and time. He has normal strength.   Skin: Skin is warm and dry. Capillary refill takes less than 2 seconds.   Psychiatric: He has a normal mood and affect. His behavior is normal. Judgment and thought content normal.         ED Course   Procedures  Labs Reviewed   BASIC METABOLIC PANEL - Abnormal       Result Value    Sodium 136      Potassium 3.9      Chloride 104      CO2 25      Glucose 127 (*)     Blood Urea Nitrogen 19.7      Creatinine 1.12      BUN/Creatinine Ratio 18      Calcium 9.1      Anion Gap 7.0      eGFR >60     CBC WITH DIFFERENTIAL - Abnormal    WBC 5.22      RBC 5.56      Hgb 11.7 (*)     Hct 37.6 (*)     MCV 67.6 (*)     MCH 21.0 (*)     MCHC 31.1 (*)     RDW 18.6 (*)     Platelet 208      MPV 8.5      Neut % 67.0      Lymph % 19.9      Mono % 9.8      Eos % 2.5      Basophil % 0.6      Imm Grans % 0.2      Neut # 3.50      Lymph # 1.04      Mono # 0.51      Eos # 0.13      Baso # 0.03      Imm Gran # 0.01     CBC W/ AUTO DIFFERENTIAL    Narrative:     The following orders were  created for panel order CBC auto differential.  Procedure                               Abnormality         Status                     ---------                               -----------         ------                     CBC with Differential[[id number]]       Abnormal            Final result                 Please view results for these tests on the individual orders.   BLOOD SMEAR MICROSCOPIC EXAM ([facility])          Imaging Results              CT Head Without Contrast (Final result)  Result time 04/04/25 14:19:49      Final result by Aris Linda MD (04/04/25 14:19:49)                   Impression:      1.  No acute intracranial findings identified.    2.  Left occipital lobe known cavernoma.      Electronically signed by: Aris Linda  Date:    04/04/2025  Time:    14:19               Narrative:    EXAMINATION:  CT HEAD WITHOUT CONTRAST    CLINICAL HISTORY:  Headache, new or worsening (Age >= 50y);    TECHNIQUE:  Sequential axial images were performed of the brain without contrast.    Dose product length of 1243 mGycm. Automated exposure control was utilized to minimize radiation dose.    COMPARISON:  CT brain February 8, 2024.  MRI brain December 23, 2024.    FINDINGS:  The left occipital lobe in the periventricular location is unremarkable for vague hyperdensity without significant change compared to prior CT exam and this was characterized to represent a cavernous malformation on the prior MRI brain December 23, 2024.  There is no intracranial mass effect, midline shift, hydrocephalus or hemorrhage. There is no sulcal effacement or low attenuation changes to suggest recent large vessel territory infarction.  Chronic microvascular ischemic changes are mild.  The ventricular system and sulcal markings prominence is consistent with atrophy. There is no acute extra axial fluid collection.    Visualized paranasal sinuses are clear without mucosal thickening, polypoidal abnormality or air-fluid levels. Mastoid air  cells aeration is optimal.                                       Medications   amLODIPine tablet 10 mg (10 mg Oral Given 4/4/25 1219)   lisinopriL tablet 10 mg (10 mg Oral Given 4/4/25 1219)   cloNIDine tablet 0.2 mg (0.2 mg Oral Given 4/4/25 1400)   ondansetron disintegrating tablet 4 mg (4 mg Oral Given 4/4/25 1400)   HYDROmorphone (PF) injection 1 mg (1 mg Intravenous Given 4/4/25 1526)     Medical Decision Making  BP is coming down headache is improved. Will refill his meds and refer him to Mirtha العلي as he no longer wants to see his current MD.     Amount and/or Complexity of Data Reviewed  Labs: ordered. Decision-making details documented in ED Course.  Radiology: ordered. Decision-making details documented in ED Course.    Risk  Prescription drug management.                                      Clinical Impression:  Final diagnoses:  [I10] Primary hypertension (Primary)          ED Disposition Condition    Discharge Stable          ED Prescriptions       Medication Sig Dispense Start Date End Date Auth. Provider    amLODIPine (NORVASC) 10 MG tablet Take 1 tablet (10 mg total) by mouth once daily. 30 tablet 4/4/2025 6/3/2025 Ti Glez MD    lisinopriL 10 MG tablet Take 1 tablet (10 mg total) by mouth once daily. 30 tablet 4/4/2025 6/3/2025 Ti Glez MD    butalbital-acetaminophen-caffeine -40 mg (FIORICET, ESGIC) -40 mg per tablet  (Status: Discontinued) Take 1 tablet by mouth every 4 (four) hours as needed for Headaches. 30 tablet 4/4/2025 4/4/2025 Ti Glez MD    divalproex ER (DEPAKOTE ER) 500 MG Tb24 24 hr tablet Take 1 tablet (500 mg total) by mouth once daily. 30 tablet 4/4/2025 6/3/2025 Ti Glez MD    HYDROcodone-acetaminophen (NORCO) 7.5-325 mg per tablet Take 1 tablet by mouth every 6 (six) hours as needed for Pain. 12 tablet 4/4/2025 -- Ti Glez MD          Follow-up Information       Follow up With Specialties Details Why Contact Info     Mirtha العلي, [facility] Schedule an appointment as soon as possible for a visit   [address] [facility] [address]  [phone]                 Ti Glez MD  04/04/25 1322       Ti Glez MD  04/04/25 1600         [1]   Social History  Tobacco Use    Smoking status: Former     Types: Cigarettes    Smokeless tobacco: Former   Substance Use Topics    Alcohol use: Yes     Comment: Occasionally    Drug use: Not Currently        Ti Glez MD  04/04/25 5218

## 2025-04-15 ENCOUNTER — HOSPITAL ENCOUNTER (EMERGENCY)
Facility: HOSPITAL | Age: 60
Discharge: HOME | End: 2025-04-15
Attending: EMERGENCY MEDICINE
Payer: MEDICAID

## 2025-04-15 VITALS
SYSTOLIC BLOOD PRESSURE: 153 MMHG | TEMPERATURE: 98 F | BODY MASS INDEX: 31.1 KG/M2 | WEIGHT: 210 LBS | DIASTOLIC BLOOD PRESSURE: 86 MMHG | HEIGHT: 69 IN | HEART RATE: 84 BPM | OXYGEN SATURATION: 100 % | RESPIRATION RATE: 20 BRPM

## 2025-04-15 DIAGNOSIS — G43.809 OTHER MIGRAINE WITHOUT STATUS MIGRAINOSUS, NOT INTRACTABLE: Primary | ICD-10-CM

## 2025-04-15 PROCEDURE — 25000003 PHARM REV CODE 250: Performed by: EMERGENCY MEDICINE

## 2025-04-15 PROCEDURE — 63600175 PHARM REV CODE 636 W HCPCS: Performed by: EMERGENCY MEDICINE

## 2025-04-15 PROCEDURE — 96374 THER/PROPH/DIAG INJ IV PUSH: CPT

## 2025-04-15 PROCEDURE — 96372 THER/PROPH/DIAG INJ SC/IM: CPT | Performed by: EMERGENCY MEDICINE

## 2025-04-15 PROCEDURE — 99284 EMERGENCY DEPT VISIT MOD MDM: CPT | Mod: 25

## 2025-04-15 RX ORDER — HYDROMORPHONE HYDROCHLORIDE 2 MG/ML
1 INJECTION, SOLUTION INTRAMUSCULAR; INTRAVENOUS; SUBCUTANEOUS
Refills: 0 | Status: COMPLETED | OUTPATIENT
Start: 2025-04-15 | End: 2025-04-15

## 2025-04-15 RX ORDER — ACETAMINOPHEN 500 MG
1000 TABLET ORAL
Status: COMPLETED | OUTPATIENT
Start: 2025-04-15 | End: 2025-04-15

## 2025-04-15 RX ORDER — PROCHLORPERAZINE EDISYLATE 5 MG/ML
10 INJECTION INTRAMUSCULAR; INTRAVENOUS
Status: COMPLETED | OUTPATIENT
Start: 2025-04-15 | End: 2025-04-15

## 2025-04-15 RX ADMIN — PROCHLORPERAZINE EDISYLATE 10 MG: 5 INJECTION INTRAMUSCULAR; INTRAVENOUS at 02:04

## 2025-04-15 RX ADMIN — ACETAMINOPHEN 1000 MG: 500 TABLET ORAL at 02:04

## 2025-04-15 RX ADMIN — HYDROMORPHONE HYDROCHLORIDE 1 MG: 2 INJECTION, SOLUTION INTRAMUSCULAR; INTRAVENOUS; SUBCUTANEOUS at 02:04

## 2025-04-15 NOTE — ED PROVIDER NOTES
"Encounter Date: 4/15/2025       History     Chief Complaint   Patient presents with    Headache     Pt c/o HA & N/V that has been occurring x4 days; Per pt, worsened by light; Per pt, he has a hx of a brain aneurysm & was seen on 04/04 for the same issue (they did a head CT & it was negative).      Chief Complaint  Headache for 4 days, vomiting, head pain, vision changes    History of Present Illness  The patient, with a history of migraines and hypertension, presents to the Emergency Department with a headache that is similar to previous episodes. The headache has been ongoing for 4 days and is consistent with his typical migraine pattern. The patient reports associated symptoms of vomiting and visual disturbances, noting that the headache is "affecting my vision a lot."    The patient emphasizes that this current episode is not the worst headache of his life and does not have a thunderclap sensation. He states he has been experiencing symptoms for the past 4 days. The headache prompted him to seek emergency care, as he "couldn't take it no more" and had a friend bring him to the hospital.    The patient reports a recent ED visit last week for a similar complaint, where he was told his symptoms were related to his blood pressure. However, he notes that he typically experiences these headaches as migraines. He has been attempting to manage his symptoms at home, previously using BC Powder, but stopped as advised by a doctor.         Review of patient's allergies indicates:  No Known Allergies  Past Medical History:   Diagnosis Date    Chronic pain disorder     Depression     Hypertension     Stroke      Past Surgical History:   Procedure Laterality Date    COLONOSCOPY N/A 8/24/2023    Procedure: COLONOSCOPY;  Surgeon: Nadir Gregg MD;  Location: [facility];  Service: Endoscopy;  Laterality: N/A;    ESOPHAGOGASTRODUODENOSCOPY N/A 12/22/2024    Procedure: EGD (ESOPHAGOGASTRODUODENOSCOPY);  Surgeon: Aaron Jones, " "MD;  Location: [facility] OR;  Service: Gastroenterology;  Laterality: N/A;    ROBOT-ASSISTED REPAIR OF UMBILICAL HERNIA USING DA NEIL XI N/A 8/28/2023    Procedure: XI ROBOTIC REPAIR, HERNIA, UMBILICAL;  Surgeon: Nadir Gregg MD;  Location: [facility] OR;  Service: General;  Laterality: N/A;  with mesh     Family History   Problem Relation Name Age of Onset    Coronary artery disease Mother      Coronary artery disease Father       Social History[1]  Review of Systems    Physical Exam     Initial Vitals [04/15/25 1314]   BP Pulse Resp Temp SpO2   (!) 153/86 84 20 97.6 °F (36.4 °C) 100 %      MAP       --         Physical Exam    ED Course   Procedures  Labs Reviewed - No data to display       Imaging Results    None          Medications   HYDROmorphone (PF) injection 1 mg (1 mg Intravenous Given 4/15/25 1444)   prochlorperazine injection Soln 10 mg (10 mg Intramuscular Given 4/15/25 1444)   acetaminophen tablet 1,000 mg (1,000 mg Oral Given 4/15/25 1444)     Medical Decision Making  The patient is an adult with a history of migraines and hypertension presenting with a headache that is similar to previous episodes, without a thunderclap sensation or acute neurological changes. The patient's blood pressure is currently normal, which differentiates this episode from a recent ED visit where hypertension was identified as the cause. While the headache persists, the description suggests a more typical migraine pattern. The "spot on the back of the head" affecting vision still warrants further investigation. The patient's recent cessation of BC Powder (aspirin-caffeine combination) could potentially contribute to medication overuse headache or withdrawal. Given the clinical presentation, neurological evaluation and possible neuroimaging may still be recommended.    Migraine headache  Assessment: Patient presents with a migraine headache consistent with previous episodes, without being the worst headache of his life. No " thunderclap sensation or acute neurological changes were noted. This aligns with the patient's history of migraines and frequent headaches. Blood pressure is noted to be normal during this visit. Patient reports having discontinued use of BC Powder for pain relief. Patient underwent a CT scan approximately 10 days ago for similar complaints.  Plan:  - Administer acute migraine medication in the emergency department using IM hydromorphone and IM Compazine  - Educate patient on risks of chronic BC Powder use for headache management  - Emphasize importance of following up with scheduled neurologist appointment next month      Risk  OTC drugs.  Prescription drug management.                                      Clinical Impression:  Final diagnoses:  [G43.809] Other migraine without status migrainosus, not intractable (Primary)          ED Disposition Condition    Discharge Stable          ED Prescriptions    None       Follow-up Information       Follow up With Specialties Details Why Contact Info    Arjun Bennett MD Family Medicine In 1 day For re-evaluation [address] [place] [address]  [phone]                 [1]   Social History  Tobacco Use    Smoking status: Former     Types: Cigarettes    Smokeless tobacco: Former   Substance Use Topics    Alcohol use: Yes     Comment: Occasionally    Drug use: Not Currently        Jose Taylor MD  04/15/25 7892

## 2025-04-15 NOTE — ED NOTES
Pt c/o HA & N/V that has been occurring x4 days; Per pt, worsened by light; Per pt, he has a hx of a brain aneurysm & was seen on 04/04 for the same issue (they did a head CT & it was negative).      *Spoke w/ ER MD regarding if he would like head CT ordered while pt in waiting room & he states he is okay w/ waiting on imaging until he is roomed.

## 2025-06-11 ENCOUNTER — TELEPHONE (OUTPATIENT)
Dept: NEUROLOGY | Facility: CLINIC | Age: 60
End: 2025-06-11
Payer: MEDICAID

## 2025-07-06 ENCOUNTER — HOSPITAL ENCOUNTER (EMERGENCY)
Facility: HOSPITAL | Age: 60
Discharge: HOME | End: 2025-07-06
Attending: EMERGENCY MEDICINE
Payer: MEDICAID

## 2025-07-06 VITALS
TEMPERATURE: 98 F | WEIGHT: 210 LBS | SYSTOLIC BLOOD PRESSURE: 164 MMHG | HEART RATE: 84 BPM | OXYGEN SATURATION: 98 % | BODY MASS INDEX: 31.1 KG/M2 | DIASTOLIC BLOOD PRESSURE: 113 MMHG | RESPIRATION RATE: 18 BRPM | HEIGHT: 69 IN

## 2025-07-06 DIAGNOSIS — I10 HYPERTENSION, UNSPECIFIED TYPE: ICD-10-CM

## 2025-07-06 DIAGNOSIS — L98.9 SKIN LESIONS: Primary | ICD-10-CM

## 2025-07-06 DIAGNOSIS — R51.9 HEADACHE: ICD-10-CM

## 2025-07-06 DIAGNOSIS — R51.9 NONINTRACTABLE EPISODIC HEADACHE, UNSPECIFIED HEADACHE TYPE: ICD-10-CM

## 2025-07-06 PROCEDURE — 93010 ELECTROCARDIOGRAM REPORT: CPT | Mod: ,,, | Performed by: STUDENT IN AN ORGANIZED HEALTH CARE EDUCATION/TRAINING PROGRAM

## 2025-07-06 PROCEDURE — 25000003 PHARM REV CODE 250: Performed by: EMERGENCY MEDICINE

## 2025-07-06 PROCEDURE — 99284 EMERGENCY DEPT VISIT MOD MDM: CPT | Mod: 25

## 2025-07-06 PROCEDURE — 93005 ELECTROCARDIOGRAM TRACING: CPT

## 2025-07-06 RX ORDER — HYDROCODONE BITARTRATE AND ACETAMINOPHEN 10; 325 MG/1; MG/1
1 TABLET ORAL EVERY 6 HOURS PRN
Qty: 8 TABLET | Refills: 0 | Status: SHIPPED | OUTPATIENT
Start: 2025-07-06

## 2025-07-06 RX ORDER — LISINOPRIL 10 MG/1
20 TABLET ORAL
Status: COMPLETED | OUTPATIENT
Start: 2025-07-06 | End: 2025-07-06

## 2025-07-06 RX ORDER — MUPIROCIN 20 MG/G
OINTMENT TOPICAL 3 TIMES DAILY
Qty: 22 G | Refills: 0 | Status: SHIPPED | OUTPATIENT
Start: 2025-07-06

## 2025-07-06 RX ORDER — LISINOPRIL 10 MG/1
10 TABLET ORAL DAILY
Qty: 30 TABLET | Refills: 1 | Status: SHIPPED | OUTPATIENT
Start: 2025-07-06 | End: 2025-09-04

## 2025-07-06 RX ORDER — AMLODIPINE BESYLATE 5 MG/1
10 TABLET ORAL
Status: COMPLETED | OUTPATIENT
Start: 2025-07-06 | End: 2025-07-06

## 2025-07-06 RX ORDER — ACETAMINOPHEN 500 MG
1000 TABLET ORAL
Status: COMPLETED | OUTPATIENT
Start: 2025-07-06 | End: 2025-07-06

## 2025-07-06 RX ORDER — HYDROCODONE BITARTRATE AND ACETAMINOPHEN 10; 325 MG/1; MG/1
1 TABLET ORAL
Refills: 0 | Status: DISCONTINUED | OUTPATIENT
Start: 2025-07-06 | End: 2025-07-06

## 2025-07-06 RX ORDER — PANTOPRAZOLE SODIUM 40 MG/1
40 TABLET, DELAYED RELEASE ORAL
COMMUNITY
Start: 2025-04-30

## 2025-07-06 RX ORDER — AMLODIPINE BESYLATE 10 MG/1
10 TABLET ORAL DAILY
Qty: 30 TABLET | Refills: 1 | Status: SHIPPED | OUTPATIENT
Start: 2025-07-06 | End: 2025-09-04

## 2025-07-06 RX ORDER — DIVALPROEX SODIUM 500 MG/1
500 TABLET, FILM COATED, EXTENDED RELEASE ORAL DAILY
Qty: 30 TABLET | Refills: 1 | Status: SHIPPED | OUTPATIENT
Start: 2025-07-06 | End: 2025-09-04

## 2025-07-06 RX ADMIN — AMLODIPINE BESYLATE 10 MG: 5 TABLET ORAL at 07:07

## 2025-07-06 RX ADMIN — ACETAMINOPHEN 1000 MG: 500 TABLET ORAL at 07:07

## 2025-07-06 RX ADMIN — LISINOPRIL 20 MG: 10 TABLET ORAL at 07:07

## 2025-07-06 NOTE — ED PROVIDER NOTES
"Encounter Date: 7/6/2025       History     Chief Complaint   Patient presents with    Headache     Pt presents with multiple vague complaints; reports about 2wks ago began getting sores/boils on his body; started with a few on abdomen, has one to left groin, another on right posterior thigh, and left elbow is beginning as well; pt states he has had a really bad headache for a few days along with dizziness; states "I just feel really really bad"; no fever reported; has not been evaluated for sores      This 59-year-old man presents with complaints of sores that have been popping up all over his body.  They start a small red spots and then get worse.  They are on his abdomen and is inner thigh his elbow his forearm.  He also complains of a headache for the past few days with some dizziness.  He ran out of his blood pressure medicine about a month ago.  Blood pressure is high on arrival.       Review of patient's allergies indicates:  No Known Allergies  Past Medical History:   Diagnosis Date    Chronic pain disorder     Depression     Hypertension     Stroke      Past Surgical History:   Procedure Laterality Date    COLONOSCOPY N/A 8/24/2023    Procedure: COLONOSCOPY;  Surgeon: Nadir Gregg MD;  Location: [facility];  Service: Endoscopy;  Laterality: N/A;    ESOPHAGOGASTRODUODENOSCOPY N/A 12/22/2024    Procedure: EGD (ESOPHAGOGASTRODUODENOSCOPY);  Surgeon: Aaron Jones MD;  Location: [facility];  Service: Gastroenterology;  Laterality: N/A;    ROBOT-ASSISTED REPAIR OF UMBILICAL HERNIA USING DA NEIL XI N/A 8/28/2023    Procedure: XI ROBOTIC REPAIR, HERNIA, UMBILICAL;  Surgeon: Nadir Gregg MD;  Location: [facility];  Service: General;  Laterality: N/A;  with mesh     Family History   Problem Relation Name Age of Onset    Coronary artery disease Mother      Coronary artery disease Father       Social History[1]  Review of Systems   Constitutional:  Negative for fever.   HENT:  Negative for sore throat.  "   Respiratory:  Negative for shortness of breath.    Cardiovascular:  Negative for chest pain.   Gastrointestinal:  Negative for nausea.   Genitourinary:  Negative for dysuria.   Musculoskeletal:  Negative for back pain.   Skin:  Negative for rash.        Multiple skin lesions, several with small areas of ulceration   Neurological:  Positive for dizziness and headaches. Negative for weakness.   Hematological:  Does not bruise/bleed easily.       Physical Exam     Initial Vitals [07/06/25 0719]   BP Pulse Resp Temp SpO2   (!) 189/119 (!) 112 (!) 24 97.9 °F (36.6 °C) 98 %      MAP       --         Physical Exam    Nursing note and vitals reviewed.  Constitutional: He appears well-developed and well-nourished.   HENT:   Head: Normocephalic and atraumatic. Mouth/Throat: Mucous membranes are normal.   Eyes: EOM are normal. Pupils are equal, round, and reactive to light.   Neck: Neck supple.   Normal range of motion.  Cardiovascular:  Normal rate, regular rhythm, normal heart sounds and intact distal pulses.           Pulmonary/Chest: Breath sounds normal.   Abdominal: Abdomen is soft. Bowel sounds are normal.   Musculoskeletal:         General: Normal range of motion.      Cervical back: Normal range of motion and neck supple.     Neurological: He is alert and oriented to person, place, and time. He has normal strength.   Skin: Skin is warm and dry. Capillary refill takes less than 2 seconds.   Multiple red areas mostly under 1 cm, 1 with ulceration.  Consistent with staph infection   Psychiatric: He has a normal mood and affect. His behavior is normal. Judgment and thought content normal.         ED Course   Procedures  Labs Reviewed - No data to display  EKG Readings: (Independently Interpreted)   Rhythm: Normal Sinus Rhythm. Heart Rate: 98. Axis: Left Axis Deviation. Clinical Impression: Left Ventricular Hypertrophy (LVH) Other Impression: Possible left atrial enlargement, pulmonary disease pattern, nonspecific T-wave  abnormality, prolonged QT   7/5/25 0711       Imaging Results    None          Medications   amLODIPine tablet 10 mg (10 mg Oral Given 7/6/25 0749)   lisinopriL tablet 20 mg (20 mg Oral Given 7/6/25 0748)   acetaminophen tablet 1,000 mg (1,000 mg Oral Given 7/6/25 0749)     Medical Decision Making  Risk  OTC drugs.  Prescription drug management.                                      Clinical Impression:  Final diagnoses:  [R51.9] Headache  [L98.9] Skin lesions (Primary)  [I10] Hypertension, unspecified type  [R51.9] Nonintractable episodic headache, unspecified headache type          ED Disposition Condition    Discharge Stable          ED Prescriptions       Medication Sig Dispense Start Date End Date Auth. Provider    HYDROcodone-acetaminophen (NORCO)  mg per tablet Take 1 tablet by mouth every 6 (six) hours as needed for Pain. 8 tablet 7/6/2025 -- Ti Glez MD    mupirocin (BACTROBAN) 2 % ointment Apply topically 3 (three) times daily. 22 g 7/6/2025 -- Ti Glez MD    amLODIPine (NORVASC) 10 MG tablet Take 1 tablet (10 mg total) by mouth once daily. 30 tablet 7/6/2025 9/4/2025 Ti Glez MD    divalproex ER (DEPAKOTE ER) 500 MG Tb24 24 hr tablet Take 1 tablet (500 mg total) by mouth once daily. 30 tablet 7/6/2025 9/4/2025 Ti Glez MD    lisinopriL 10 MG tablet Take 1 tablet (10 mg total) by mouth once daily. 30 tablet 7/6/2025 9/4/2025 Ti Glez MD          Follow-up Information       Follow up With Specialties Details Why Contact Info    Arjun Bennett MD Family Medicine Schedule an appointment as soon as possible for a visit  As needed, If symptoms worsen [address]  [place] [address]  [phone]                     [1]   Social History  Tobacco Use    Smoking status: Former     Types: Cigarettes    Smokeless tobacco: Former   Substance Use Topics    Alcohol use: Yes     Comment: Occasionally    Drug use: Not Currently        Amaris,  Ti ARCE MD  07/06/25 0932

## 2025-07-07 LAB
OHS QRS DURATION: 98 MS
OHS QTC CALCULATION: 495 MS

## 2025-07-21 ENCOUNTER — HOSPITAL ENCOUNTER (EMERGENCY)
Facility: HOSPITAL | Age: 60
Discharge: HOME | End: 2025-07-21
Attending: EMERGENCY MEDICINE
Payer: MEDICAID

## 2025-07-21 VITALS
OXYGEN SATURATION: 96 % | HEART RATE: 78 BPM | BODY MASS INDEX: 31.1 KG/M2 | WEIGHT: 210 LBS | TEMPERATURE: 98 F | DIASTOLIC BLOOD PRESSURE: 79 MMHG | SYSTOLIC BLOOD PRESSURE: 124 MMHG | HEIGHT: 69 IN | RESPIRATION RATE: 18 BRPM

## 2025-07-21 DIAGNOSIS — G44.209 TENSION HEADACHE: Primary | ICD-10-CM

## 2025-07-21 PROCEDURE — 63600175 PHARM REV CODE 636 W HCPCS: Performed by: EMERGENCY MEDICINE

## 2025-07-21 PROCEDURE — 99285 EMERGENCY DEPT VISIT HI MDM: CPT | Mod: 25

## 2025-07-21 PROCEDURE — 96374 THER/PROPH/DIAG INJ IV PUSH: CPT

## 2025-07-21 PROCEDURE — 96375 TX/PRO/DX INJ NEW DRUG ADDON: CPT

## 2025-07-21 RX ORDER — PROCHLORPERAZINE EDISYLATE 5 MG/ML
10 INJECTION INTRAMUSCULAR; INTRAVENOUS
Status: COMPLETED | OUTPATIENT
Start: 2025-07-21 | End: 2025-07-21

## 2025-07-21 RX ORDER — HYDROMORPHONE HYDROCHLORIDE 2 MG/ML
1 INJECTION, SOLUTION INTRAMUSCULAR; INTRAVENOUS; SUBCUTANEOUS
Refills: 0 | Status: COMPLETED | OUTPATIENT
Start: 2025-07-21 | End: 2025-07-21

## 2025-07-21 RX ORDER — TRAMADOL HYDROCHLORIDE 50 MG/1
50 TABLET, FILM COATED ORAL EVERY 6 HOURS PRN
Qty: 12 TABLET | Refills: 0 | Status: SHIPPED | OUTPATIENT
Start: 2025-07-21

## 2025-07-21 RX ADMIN — PROCHLORPERAZINE EDISYLATE 10 MG: 5 INJECTION INTRAMUSCULAR; INTRAVENOUS at 03:07

## 2025-07-21 RX ADMIN — HYDROMORPHONE HYDROCHLORIDE 1 MG: 2 INJECTION, SOLUTION INTRAMUSCULAR; INTRAVENOUS; SUBCUTANEOUS at 03:07

## 2025-07-21 NOTE — ED PROVIDER NOTES
Encounter Date: 7/21/2025       History     Chief Complaint   Patient presents with    Headache     Pt c/o frontal & occipital HA x5 days w/double vision concerned due to hx aneurysm; no one sided neglect or weakness noted, equal strength in all extremities;       This 59-year-old man presents with frontal and occipital headache for the past 5 days.  He states he has photophobia.  He also complains about blurred vision and dizziness and states these of the symptoms he had when he had a brain bleed 6 years ago.  He has no focal weakness.  He has a history of frequent headaches.  He denies fever he states he has chronic runny nose.  In addition he complains of agitation and frustration.       Review of patient's allergies indicates:  No Known Allergies  Past Medical History:   Diagnosis Date    Chronic pain disorder     Depression     Headache     Hypertension     Stroke      Past Surgical History:   Procedure Laterality Date    COLONOSCOPY N/A 8/24/2023    Procedure: COLONOSCOPY;  Surgeon: Nadir Gregg MD;  Location: [facility];  Service: Endoscopy;  Laterality: N/A;    ESOPHAGOGASTRODUODENOSCOPY N/A 12/22/2024    Procedure: EGD (ESOPHAGOGASTRODUODENOSCOPY);  Surgeon: Aaron Jones MD;  Location: [facility];  Service: Gastroenterology;  Laterality: N/A;    ROBOT-ASSISTED REPAIR OF UMBILICAL HERNIA USING DA NEIL XI N/A 8/28/2023    Procedure: XI ROBOTIC REPAIR, HERNIA, UMBILICAL;  Surgeon: Nadir Gregg MD;  Location: [facility];  Service: General;  Laterality: N/A;  with mesh     Family History   Problem Relation Name Age of Onset    Coronary artery disease Mother      Coronary artery disease Father       Social History[1]  Review of Systems   Constitutional:  Negative for fever.   HENT:  Positive for rhinorrhea. Negative for sore throat.    Eyes:  Positive for photophobia.   Respiratory:  Negative for shortness of breath.    Cardiovascular:  Negative for chest pain.   Gastrointestinal:  Negative for nausea.    Genitourinary:  Negative for dysuria.   Musculoskeletal:  Negative for back pain.   Skin:  Negative for rash.   Neurological:  Positive for headaches. Negative for weakness.   Hematological:  Does not bruise/bleed easily.       Physical Exam     Initial Vitals [07/21/25 1348]   BP Pulse Resp Temp SpO2   (!) 197/96 96 20 98.2 °F (36.8 °C) 95 %      MAP       --         Physical Exam    Nursing note and vitals reviewed.  Constitutional: He appears well-developed and well-nourished.   HENT:   Head: Normocephalic and atraumatic. Mouth/Throat: Mucous membranes are normal.   Eyes: EOM are normal. Pupils are equal, round, and reactive to light.   Neck: Neck supple.   Normal range of motion.  Cardiovascular:  Normal rate, regular rhythm, normal heart sounds and intact distal pulses.           Pulmonary/Chest: Breath sounds normal.   Abdominal: Abdomen is soft. Bowel sounds are normal.   Musculoskeletal:         General: Normal range of motion.      Cervical back: Normal range of motion and neck supple.     Neurological: He is alert and oriented to person, place, and time. He has normal strength.   Skin: Skin is warm and dry. Capillary refill takes less than 2 seconds.   Psychiatric: He has a normal mood and affect. His behavior is normal. Judgment and thought content normal.         ED Course   Procedures  Labs Reviewed - No data to display       Imaging Results              CT Head Without Contrast (Final result)  Result time 07/21/25 15:16:24      Final result by Vickie Mayfield MD (07/21/25 15:16:24)                   Impression:      No acute abnormality seen.  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  Sagittal and coronal reconstructions were performed. .Automatic exposure control  (AEC) is utilized to reduce patient radiation exposure.    COMPARISON:  None    FINDINGS:  There is a partially calcified lesion seen in the left posterior parietal region which was seen on the prior examination as well and is consistent with patient's known cavernoma.  No other lesions are seen..  No hemorrhage is seen.  No infarct is seen.  The ventricles and basilar cisterns appear normal.  Brain parenchyma appears grossly unremarkable.    Posterior fossa appears normal.  The calvarium is intact.  The paranasal sinuses appear grossly unremarkable.                                       Medications   prochlorperazine injection Soln 10 mg (10 mg Intravenous Given 7/21/25 1517)     Medical Decision Making  Amount and/or Complexity of Data Reviewed  Radiology: ordered.    Risk  Prescription drug management.                                          Clinical Impression:  Final diagnoses:  [G44.209] Tension headache (Primary)          ED Disposition Condition    Discharge Stable          ED Prescriptions       Medication Sig Dispense Start Date End Date Auth. Provider    traMADoL (ULTRAM) 50 mg tablet Take 1 tablet (50 mg total) by mouth every 6 (six) hours as needed for Pain. 12 tablet 7/21/2025 -- Ti Glez MD          Follow-up Information       Follow up With Specialties Details Why Contact Info    Follow-up with your primary care provider as needed                       [1]   Social History  Tobacco Use    Smoking status: Former     Types: Cigarettes    Smokeless tobacco: Former   Substance Use Topics    Alcohol use: Yes     Comment: Occasionally    Drug use: Not Currently        Ti Glez MD  07/21/25 [id number]

## (undated) DEVICE — GOWN ECLIPSE REINF LVL4 TWL XL

## (undated) DEVICE — SOL ELECTROLUBE ANTI-STIC

## (undated) DEVICE — SYR 10CC LUER LOCK

## (undated) DEVICE — SUT VLOC 2-0 6IN 1/2 CIRCLE TP

## (undated) DEVICE — TUBING MEDI-VAC 20FT 0.29IN

## (undated) DEVICE — GLOVE PROTEXIS BLUE LATEX 7

## (undated) DEVICE — NDL PNEUMO INSUFFLATI 120MM

## (undated) DEVICE — OBTURATOR BLADELESS 8MM XI CLR

## (undated) DEVICE — SOL CLEARIFY VISUALIZATION LAP

## (undated) DEVICE — KIT SURGICAL COLON .25 1.1OZ

## (undated) DEVICE — ADHESIVE DERMABOND ADVANCED

## (undated) DEVICE — PROTECTOR ONE-STEP ARM REG

## (undated) DEVICE — GOWN POLY REINF BRTH SLV XL

## (undated) DEVICE — SUT 0 VICRYL / UR6 (J603)

## (undated) DEVICE — DRAPE ARM DAVINCI XI

## (undated) DEVICE — COVER PROXIMA MAYO STAND

## (undated) DEVICE — CATH INJECTION GOLD PROBE 7FR

## (undated) DEVICE — SEAL UNIVERSAL 5MM-8MM XI

## (undated) DEVICE — GLOVE PROTEXIS PF LATEX 7.0

## (undated) DEVICE — KIT CANIST SUCTION 1200CC

## (undated) DEVICE — SET TUB INSUFFLATION SUC 20L

## (undated) DEVICE — APPLICATOR CHLORAPREP ORN 26ML

## (undated) DEVICE — SCISSOR HOT SHEARS XI

## (undated) DEVICE — DRESSING TRANS 4X4 TEGADERM

## (undated) DEVICE — SOL IRRI STRL WATER 1000ML

## (undated) DEVICE — DEVICE CLSR V-LOC 0 GS-21 6IN

## (undated) DEVICE — COVER TIP CURVED SCISSORS XI

## (undated) DEVICE — TUBING O2 FEMALE CONN 13FT

## (undated) DEVICE — DRAPE COLUMN DAVINCI XI

## (undated) DEVICE — FORCE BIPOLAR DA VINCI

## (undated) DEVICE — BAG TISSUE RETRIEVAL 5MM

## (undated) DEVICE — SUT GUT PL. 4-0 27 FS-2

## (undated) DEVICE — Device

## (undated) DEVICE — GLOVE PROTEXIS HYDROGEL SZ6.5

## (undated) DEVICE — BINDER ABDOM 4PANEL 12IN LG/XL

## (undated) DEVICE — TROCAR ENDO Z THREAD KII 5X100

## (undated) DEVICE — OBTURATOR BLDLESS 8MM LONG XI

## (undated) DEVICE — NDL MAGELLAN SAFETY 18G 1.5IN

## (undated) DEVICE — NDL ECLIPSE HYPO 22GA 1IN

## (undated) DEVICE — COLLECTION SPECIMEN NEPTUNE

## (undated) DEVICE — BLOCK BLOX BITE DENT RIM 54FR

## (undated) DEVICE — POSITIONER HEEL FOAM CONVOLTD

## (undated) DEVICE — BLANKET SNUGGLE WARM UPPER BDY

## (undated) DEVICE — CLIP RESOLUTION 360 ULT 235CM

## (undated) DEVICE — DRIVER NEEDLE DA VINCI

## (undated) DEVICE — GLOVE PROTEXIS HYDROGEL SZ7.5

## (undated) DEVICE — PAD ELECTROSURGICAL SPL W/CORD

## (undated) DEVICE — SUT V-LOC GRN 30CM 12IN 2-0

## (undated) DEVICE — DEVICE RESOLUTION 360 CLIP

## (undated) DEVICE — KIT PINK PAD EXT BODY STRP SHT

## (undated) DEVICE — TOWEL OR BLUE STRL 16X26 4/PK

## (undated) DEVICE — TIP SUCTION YANKAUER

## (undated) DEVICE — SUPPORT ULNA NERVE PROTECTOR

## (undated) DEVICE — TUBING HF INSUFFLATION W/ FLTR